# Patient Record
Sex: MALE | Race: WHITE | NOT HISPANIC OR LATINO | URBAN - METROPOLITAN AREA
[De-identification: names, ages, dates, MRNs, and addresses within clinical notes are randomized per-mention and may not be internally consistent; named-entity substitution may affect disease eponyms.]

---

## 2018-05-22 ENCOUNTER — INPATIENT (INPATIENT)
Facility: HOSPITAL | Age: 83
LOS: 8 days | Discharge: ANOTHER HOSPITAL | End: 2018-05-31
Attending: HOSPITALIST | Admitting: HOSPITALIST
Payer: MEDICARE

## 2018-05-22 VITALS
OXYGEN SATURATION: 96 % | TEMPERATURE: 98 F | RESPIRATION RATE: 16 BRPM | SYSTOLIC BLOOD PRESSURE: 107 MMHG | HEART RATE: 76 BPM | DIASTOLIC BLOOD PRESSURE: 65 MMHG

## 2018-05-22 DIAGNOSIS — R42 DIZZINESS AND GIDDINESS: ICD-10-CM

## 2018-05-22 DIAGNOSIS — R06.00 DYSPNEA, UNSPECIFIED: ICD-10-CM

## 2018-05-22 DIAGNOSIS — Z29.9 ENCOUNTER FOR PROPHYLACTIC MEASURES, UNSPECIFIED: ICD-10-CM

## 2018-05-22 DIAGNOSIS — J90 PLEURAL EFFUSION, NOT ELSEWHERE CLASSIFIED: ICD-10-CM

## 2018-05-22 DIAGNOSIS — I35.0 NONRHEUMATIC AORTIC (VALVE) STENOSIS: ICD-10-CM

## 2018-05-22 DIAGNOSIS — N17.9 ACUTE KIDNEY FAILURE, UNSPECIFIED: ICD-10-CM

## 2018-05-22 DIAGNOSIS — R06.09 OTHER FORMS OF DYSPNEA: ICD-10-CM

## 2018-05-22 LAB
ALBUMIN SERPL ELPH-MCNC: 3.7 G/DL — SIGNIFICANT CHANGE UP (ref 3.3–5)
ALP SERPL-CCNC: 109 U/L — SIGNIFICANT CHANGE UP (ref 40–120)
ALT FLD-CCNC: 8 U/L — SIGNIFICANT CHANGE UP (ref 4–41)
AST SERPL-CCNC: 21 U/L — SIGNIFICANT CHANGE UP (ref 4–40)
BASE EXCESS BLDV CALC-SCNC: -0.2 MMOL/L — SIGNIFICANT CHANGE UP
BASOPHILS # BLD AUTO: 0.04 K/UL — SIGNIFICANT CHANGE UP (ref 0–0.2)
BASOPHILS NFR BLD AUTO: 0.5 % — SIGNIFICANT CHANGE UP (ref 0–2)
BILIRUB SERPL-MCNC: 1 MG/DL — SIGNIFICANT CHANGE UP (ref 0.2–1.2)
BLOOD GAS VENOUS - CREATININE: 1.54 MG/DL — HIGH (ref 0.5–1.3)
BUN SERPL-MCNC: 31 MG/DL — HIGH (ref 7–23)
CALCIUM SERPL-MCNC: 8.8 MG/DL — SIGNIFICANT CHANGE UP (ref 8.4–10.5)
CHLORIDE BLDV-SCNC: 112 MMOL/L — HIGH (ref 96–108)
CHLORIDE SERPL-SCNC: 103 MMOL/L — SIGNIFICANT CHANGE UP (ref 98–107)
CK SERPL-CCNC: 45 U/L — SIGNIFICANT CHANGE UP (ref 30–200)
CO2 SERPL-SCNC: 22 MMOL/L — SIGNIFICANT CHANGE UP (ref 22–31)
CREAT SERPL-MCNC: 1.59 MG/DL — HIGH (ref 0.5–1.3)
EOSINOPHIL # BLD AUTO: 0.03 K/UL — SIGNIFICANT CHANGE UP (ref 0–0.5)
EOSINOPHIL NFR BLD AUTO: 0.4 % — SIGNIFICANT CHANGE UP (ref 0–6)
GAS PNL BLDV: 134 MMOL/L — LOW (ref 136–146)
GLUCOSE BLDV-MCNC: 95 — SIGNIFICANT CHANGE UP (ref 70–99)
GLUCOSE SERPL-MCNC: 92 MG/DL — SIGNIFICANT CHANGE UP (ref 70–99)
HCO3 BLDV-SCNC: 24 MMOL/L — SIGNIFICANT CHANGE UP (ref 20–27)
HCT VFR BLD CALC: 44 % — SIGNIFICANT CHANGE UP (ref 39–50)
HCT VFR BLDV CALC: 42.4 % — SIGNIFICANT CHANGE UP (ref 39–51)
HGB BLD-MCNC: 13.9 G/DL — SIGNIFICANT CHANGE UP (ref 13–17)
HGB BLDV-MCNC: 13.8 G/DL — SIGNIFICANT CHANGE UP (ref 13–17)
IMM GRANULOCYTES # BLD AUTO: 0.03 # — SIGNIFICANT CHANGE UP
IMM GRANULOCYTES NFR BLD AUTO: 0.4 % — SIGNIFICANT CHANGE UP (ref 0–1.5)
LACTATE BLDV-MCNC: 1.1 MMOL/L — SIGNIFICANT CHANGE UP (ref 0.5–2)
LYMPHOCYTES # BLD AUTO: 1.84 K/UL — SIGNIFICANT CHANGE UP (ref 1–3.3)
LYMPHOCYTES # BLD AUTO: 24 % — SIGNIFICANT CHANGE UP (ref 13–44)
MCHC RBC-ENTMCNC: 31.6 % — LOW (ref 32–36)
MCHC RBC-ENTMCNC: 32.1 PG — SIGNIFICANT CHANGE UP (ref 27–34)
MCV RBC AUTO: 101.6 FL — HIGH (ref 80–100)
MONOCYTES # BLD AUTO: 0.35 K/UL — SIGNIFICANT CHANGE UP (ref 0–0.9)
MONOCYTES NFR BLD AUTO: 4.6 % — SIGNIFICANT CHANGE UP (ref 2–14)
NEUTROPHILS # BLD AUTO: 5.38 K/UL — SIGNIFICANT CHANGE UP (ref 1.8–7.4)
NEUTROPHILS NFR BLD AUTO: 70.1 % — SIGNIFICANT CHANGE UP (ref 43–77)
NRBC # FLD: 0 — SIGNIFICANT CHANGE UP
NT-PROBNP SERPL-SCNC: 2416 PG/ML — SIGNIFICANT CHANGE UP
PCO2 BLDV: 42 MMHG — SIGNIFICANT CHANGE UP (ref 41–51)
PH BLDV: 7.38 PH — SIGNIFICANT CHANGE UP (ref 7.32–7.43)
PLATELET # BLD AUTO: 176 K/UL — SIGNIFICANT CHANGE UP (ref 150–400)
PMV BLD: 12.6 FL — SIGNIFICANT CHANGE UP (ref 7–13)
PO2 BLDV: 43 MMHG — HIGH (ref 35–40)
POTASSIUM BLDV-SCNC: 3.9 MMOL/L — SIGNIFICANT CHANGE UP (ref 3.4–4.5)
POTASSIUM SERPL-MCNC: 4.4 MMOL/L — SIGNIFICANT CHANGE UP (ref 3.5–5.3)
POTASSIUM SERPL-SCNC: 4.4 MMOL/L — SIGNIFICANT CHANGE UP (ref 3.5–5.3)
PROT SERPL-MCNC: 6.8 G/DL — SIGNIFICANT CHANGE UP (ref 6–8.3)
RBC # BLD: 4.33 M/UL — SIGNIFICANT CHANGE UP (ref 4.2–5.8)
RBC # FLD: 13 % — SIGNIFICANT CHANGE UP (ref 10.3–14.5)
SAO2 % BLDV: 72.1 % — SIGNIFICANT CHANGE UP (ref 60–85)
SODIUM SERPL-SCNC: 143 MMOL/L — SIGNIFICANT CHANGE UP (ref 135–145)
TROPONIN T SERPL-MCNC: < 0.06 NG/ML — SIGNIFICANT CHANGE UP (ref 0–0.06)
WBC # BLD: 7.67 K/UL — SIGNIFICANT CHANGE UP (ref 3.8–10.5)
WBC # FLD AUTO: 7.67 K/UL — SIGNIFICANT CHANGE UP (ref 3.8–10.5)

## 2018-05-22 PROCEDURE — 71046 X-RAY EXAM CHEST 2 VIEWS: CPT | Mod: 26

## 2018-05-22 PROCEDURE — 70450 CT HEAD/BRAIN W/O DYE: CPT | Mod: 26

## 2018-05-22 PROCEDURE — 99223 1ST HOSP IP/OBS HIGH 75: CPT | Mod: GC

## 2018-05-22 RX ORDER — HEPARIN SODIUM 5000 [USP'U]/ML
5000 INJECTION INTRAVENOUS; SUBCUTANEOUS EVERY 8 HOURS
Qty: 0 | Refills: 0 | Status: DISCONTINUED | OUTPATIENT
Start: 2018-05-22 | End: 2018-05-31

## 2018-05-22 RX ADMIN — HEPARIN SODIUM 5000 UNIT(S): 5000 INJECTION INTRAVENOUS; SUBCUTANEOUS at 21:33

## 2018-05-22 NOTE — ED ADULT NURSE NOTE - OBJECTIVE STATEMENT
Receive patient in room 4 accompanied by his daughter-in-law from home with complaints of dizzyness x 1 week. 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 Sign  Christina Arellano made aware that her services are needed for patient. Patient is alert and oriented x 3 no c/o pain , no c/o dizzyness no respiratory distress. Placed on cardiac monitor, labs sent will continue to monitor. Receive patient in room 4 accompanied by his daughter-in-law Yaa from home with complaints of dizzyness x 1 week. 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  Christina Arellano made aware that her services are needed for patient. Patient is alert and oriented x 3 no c/o pain , no c/o dizzyness no respiratory distress. Placed on cardiac monitor, labs sent will continue to monitor.

## 2018-05-22 NOTE — ED PROCEDURE NOTE - PROCEDURE ADDITIONAL DETAILS
[id number], Ultrasound, limited, Thorax  Focused ED ultrasound of the lungs and pleura:    Indication:    Findings: Normal lung sliding bilaterally  Moderate left pleural effusion.  Scattered static air bronchograms on the Left.  No pneumonia visualized.    Impression: Moderate L pleural effusion with atelectasis.    Procedure note and images placed in chart

## 2018-05-22 NOTE — H&P ADULT - ASSESSMENT
86y M with pmhx of deaf/mute p/w SOB/GARCIA x 1 week, found to have moderate size pleural effusion on cxr 86y M with pmhx of deaf/mute p/w dizziness x 1 week, found to have moderate size pleural effusion on cxr at urgent care, sent in for evaluation

## 2018-05-22 NOTE — H&P ADULT - NSHPPHYSICALEXAM_GEN_ALL_CORE
PHYSICAL EXAM:  Vital Signs Last 24 Hrs  T(C): 36.7 (05-22-18 @ 13:52)  T(F): 98 (05-22-18 @ 13:52), Max: 98.2 (05-22-18 @ 10:13)  HR: 82 (05-22-18 @ 13:52) (76 - 85)  BP: 120/70 (05-22-18 @ 13:52)  BP(mean): --  RR: 19 (05-22-18 @ 13:52) (16 - 19)  SpO2: 100% (05-22-18 @ 13:52) (96% - 100%)  Wt(kg): --    Constitutional: NAD, awake and alert  EYES: EOMI  Neck: Soft and supple, No JVD  Respiratory: Diminished BS on LLL, otherwise CTA   Cardiovascular: S1 and S2, regular rate and rhythm,  4/6 systolic murmur loudest in 2nd R ICS with radiation to carotids  Gastrointestinal: Bowel Sounds present, soft, nontender, nondistended, no guarding, no rebound  Extremities: No cyanosis or clubbing; warm to touch  Vascular: 2+ peripheral pulses lower ex  Neurological: Patient is deaf/mute, no appreciable deficits visible  Musculoskeletal: 5/5 strength b/l upper and lower extremities  Skin: No rashes, warm & dry  Psych: no depression or anhedonia  Heme: no bruises, no nose bleeds PHYSICAL EXAM:  Vital Signs Last 24 Hrs  T(C): 36.7 (05-22-18 @ 13:52)  T(F): 98 (05-22-18 @ 13:52), Max: 98.2 (05-22-18 @ 10:13)  HR: 82 (05-22-18 @ 13:52) (76 - 85)  BP: 120/70 (05-22-18 @ 13:52)  BP(mean): --  RR: 19 (05-22-18 @ 13:52) (16 - 19)  SpO2: 100% (05-22-18 @ 13:52) (96% - 100%)  Wt(kg): --    Constitutional: NAD, awake and alert  EYES: EOMI, PERRL, no nystagmus  Neck: Soft and supple, No JVD  Respiratory: Diminished BS on LLL, otherwise CTA   Cardiovascular: S1 and S2, regular rate and rhythm,  4/6 systolic murmur loudest in 2nd R ICS with radiation to carotids  Gastrointestinal: Bowel Sounds present, soft, nontender, nondistended, no guarding, no rebound  Extremities: No cyanosis or clubbing; warm to touch  Vascular: 2+ peripheral pulses lower ex  Neurological: Patient is deaf/mute, no appreciable deficits visible. AOx3, Negative cerebellar signs. No dysdiadochokinesia  Musculoskeletal: 5/5 strength b/l upper and lower extremities  Skin: No rashes, warm & dry  Psych: no depression or anhedonia  Heme: no bruises, no nose bleeds

## 2018-05-22 NOTE — ED ADULT TRIAGE NOTE - CHIEF COMPLAINT QUOTE
brought in by daughter in law for evaluation of dizziness x 1 week, was taken to [facility] 2 days ago for evaluation and was instructed to go to ED for evaluation of abnormal EKG and left plueral effusion seen on xray.  [facility] wanted to call EMS but patient refused.  As per family was suppose to have a cardiac procedure done some time ago and refused.  Patient c/o chest discomfort.  Patient is deaf and mute.

## 2018-05-22 NOTE — ED PROVIDER NOTE - OBJECTIVE STATEMENT
86M hx deaf/mute, with outpt EKG LBBB/long QT and left pleural effusion from urgent care records 2 days ago, brought by daughter in law for GARCIA, SOB, palpitations and dizziness x 1 week, worsened 2 days ago. Patient refused w/u last time he was referred for "procedure", has not had f/u with pcp in 5 years. 86M hx deaf/mute, with outpt EKG LBBB/long QT and left pleural effusion from urgent care records 2 days ago, brought by daughter in law for GARCIA, SOB, palpitations and dizziness x 1 week, worsened 2 days ago. Patient refused w/u last time he was referred for "procedure", has not had f/u with pcp in 5 years.     at bedside.

## 2018-05-22 NOTE — ED PROVIDER NOTE - NS ED ATTENDING STATEMENT MOD
- Decrease the iron supplement to once daily, and continue this indefinitely.    I have personally seen and examined this patient.  I have fully participated in the care of this patient. I have reviewed all pertinent clinical information, including history, physical exam, plan and the Resident’s note and agree except as noted.

## 2018-05-22 NOTE — H&P ADULT - PROBLEM SELECTOR PLAN 1
- Patient with GARCIA/SOB x 1 week, found to have new L pleural effusion compared to CT scan from 2015  - Unclear etiology of pleural effusion or GARCIA at this time, however suspicion for HF related effusion/ GARCIA vs. AS   - Prior TTE from 2016 showed severe AS with reduced LV fxn ( no documented EF)  - Patients labs consistent with elevated pro-bnp of 2460, no EKG changes, Negative CE's. No leukocytosis or fevers noted  - Will check CT chest, repeat TTE, pulm consult for possible thoracentesis  - After CT chest performed would trial IV diuresis - Patient with complaints of dizziness x 1 week with gait instability which has since improved  - Incidentally found to have a L pleural effusion on CXR  - Will check a CTH, B12, TSH  - Repeat TTE for AS  - Will Check CTH for structural/ central causes of dizziness - Patient with complaints of dizziness x 1 week with gait instability which has since improved  - Incidentally found to have a L pleural effusion on CXR  - Will check a CTH, B12, TSH  - Will Check CTH for structural/ central causes of dizziness  - PT/OT consult  - Can likely follow up outpt with neurology if sx's persist. - Patient with complaints of dizziness x 1 week with gait instability which has since improved  - Incidentally found to have a L pleural effusion on CXR  - Will check a CTH, B12, TSH  - Will Check CTH for structural/ central causes of dizziness  - PT/OT consult  -If patient continues to have sx's would call neurology c/s and check MRI

## 2018-05-22 NOTE — H&P ADULT - HISTORY OF PRESENT ILLNESS
86y M with pmhx of deaf/mute p/w SOB/GARCIA x 1 week. Patient was seen by urgent care and found to have a L pleural effusion on CXR. Of note, no  available at this time; hx is obtained from the ED provider note and chart review. VM left for pt's son Rah at [phone]. ED provider note states patient has been having palpitations, dizziness, GARCIA/SOB x 1 week. Is not on any medications, has not seen a PCP in over 5 years. Of note patient was last hospitalized in [facility] in Sept 2016 for GIB in setting of heavy ETOH use, EGD at the time revealed one non-bleeding 6mm cratered gastric ulcer with multiple small non-bleeding erosions in gastric antrum. No stigmata of recent bleeding was found. In the duodenal bulb, there was diffusely moderately erythematous mucosa without active bleeding      TTE from 2016: : Moderate global left ventricular systolic dysfunction. Severe AS      VS in the ED: Temp of 98.2, HR: 76-85, BP: 107//69, RR: 18 98% on RA 86y M with pmhx of deaf/mute p/w SOB/GARCIA x 1 week. Patient was seen by urgent care and found to have a L pleural effusion on CXR. Of note, no  available at this time; hx is obtained from the ED provider note and chart review. VM left for pt's son Rah at [phone]. ED provider note states patient has been having palpitations, dizziness, GARCIA/SOB x 1 week. Is not on any medications, has not seen a PCP in over 5 years. Of note patient was last hospitalized in [facility] in Sept 2016 for GIB in setting of heavy ETOH use, EGD at the time revealed one non-bleeding 6mm cratered gastric ulcer with multiple small non-bleeding erosions in gastric antrum. No stigmata of recent bleeding was found. In the duodenal bulb, there was diffusely moderately erythematous mucosa without active bleeding      TTE from 2016: : Moderate global left ventricular systolic dysfunction. Severe AS      ******Addendum ****** 15:30     present at the bedside. Patient states that for the last week he has had dizziness, gait instability which he describes as being drunk. Denies any hx of falls, nausea, vomiting, fevers, chills, HA, visual changes, recent travel or sick contacts. States his daughter took him to an urgent care doctor two days ago who told him to go to the ED because he has fluid in his lungs. He denies having any medical problems, taking any medications. Denies ever experiencing SOB/GARCIA, CP, palpitations. Patient states that the dizziness has improved.      VS in the ED: Temp of 98.2, HR: 76-85, BP: 107//69, RR: 18 98% on RA

## 2018-05-22 NOTE — H&P ADULT - PROBLEM SELECTOR PLAN 4
- Patient with possible CASH on admission vs. CKD, prior labwork from 2016 showed a Cr of 1.86  - Will avoid nephrotoxic agents at this time  - Check UA, urine studies - Patient with possible CASH on admission vs. CKD, prior labwork from 2016 showed a Cr of 1.86  - Will avoid nephrotoxic agents at this time  - Check UA, urine studies  - Patient likely has CKD

## 2018-05-22 NOTE — H&P ADULT - NSHPSOCIALHISTORY_GEN_ALL_CORE
Prior H&P from 2016 states patient is a daily ETOH user, 3-4 drinks a day. Former 5 pack year smoking hx, quit 7 years ago. Prior H&P from 2016 states patient is a daily ETOH user, 2 glasses of wine a  day. Former 5 pack year smoking hx, quit 32 years ago. Ambulates with a cane Daily ETOH user, 2 glasses of wine a  day. Former 5 pack year smoking hx, quit 32 years ago. Ambulates with a cane

## 2018-05-22 NOTE — H&P ADULT - PROBLEM SELECTOR PLAN 5
- PT consult, fall precautions  - Dash diet  - Improve Score of 0.6%, will place SCD - PT consult, fall precautions  - Dash diet  - Improve Score of 0.6%, HSQ in setting of CASH

## 2018-05-22 NOTE — CONSULT NOTE ADULT - ASSESSMENT
86 year old M who is deaf/mute and history of severe AS p/w dizziness x 1 week w/ L pleural effusion on CXR, now asymptomatic and bedside US showing small L pleural effusion, likely etiology from heart failure given elevated pro-BNP and severe AS.     Recommendation:   - Bedside POCUS findings as above, effusion on L side is very small, patient asymptomatic currently and effusion is likely related to heart failure, no thoracentesis at this time, recommend diuresis as tolerated  - Repeat TTE    Case discussed with fellow Dr. Cottrell and attending Dr. Mode Glynn MD PGY-1  Pager: [phone]

## 2018-05-22 NOTE — ED ADULT NURSE NOTE - CHIEF COMPLAINT QUOTE
brought in by daughter in law for evaluation of dizziness x 1 week, was taken to [facility] 2 days ago for evaluation and was instructed to go to ED for evaluation of abnormal EKG and left plueral effusion seen on xray.  [facility] wanted to call EMS but patient refused.  As per family was suppose to have a cardiac procedure done some time ago and refused.  Patient c/o chest discomfort.  Patient is deaf and mute.

## 2018-05-22 NOTE — H&P ADULT - PROBLEM SELECTOR PLAN 3
- Patient with severe AS on prior TTE from 2016, suspect clinical presentation at least partially 2/2 AS  - Will repeat TTE, f/u CT chest  - Consider Cardiology consult if AS deemed to be cause of GARCIA - Patient with severe AS on prior TTE from 2016, suspect clinical presentation at least partially 2/2 AS  - Will repeat TTE  - Consider Cardiology consult if indicated but patient appears to have asymptomatic AS at this time  - Gentle diuresis

## 2018-05-22 NOTE — ED ADULT NURSE NOTE - CHPI ED SYMPTOMS NEG
no chest pain/no chills/no diaphoresis/no syncope/no shortness of breath/no vomiting/no cough/no fever/no back pain/no nausea

## 2018-05-22 NOTE — ED PROVIDER NOTE - ATTENDING CONTRIBUTION TO CARE
Attending Statement: I have personally seen and examined this patient. I have fully participated in the care of this patient. I have reviewed all pertinent clinical information, including history physical exam, plan and the Resident's note and agree except as noted   85yo M hx deaf/mute from home with daughter in law "to be checked out"   She states for last week he has "looked short of breath" and "felt dizzy" went to an  couple days ago had a cxr and ekg, Found to have a LBBB and a left pleural effusion, advised to go to ED. Pt refused. went home. Today daughter in law bought in to ED, pt 'feeling better" Pt has no complaints. "doesn't want to be here" no chest pain no sob. no pain. pending  to come.  Vital signs noted. pt able to undress without any distress. looks comfortable normal S1-S2 No resp distress. able to speak in full and clear sentences. no wheeze, rales or stridor. soft nondistended nontender abdomen. no pedal edema. no calf tenderness. normal pulses bilateral feet.   plan ekg, labs, cxr, reassess with . Attending Statement: I have personally seen and examined this patient. I have fully participated in the care of this patient. I have reviewed all pertinent clinical information, including history physical exam, plan and the Resident's note and agree except as noted   87yo M hx deaf/mute from home with daughter in law "to be checked out"   She states for last week he has "looked short of breath" and "felt dizzy" went to an  couple days ago had a cxr and ekg, Found to have a LBBB and a left pleural effusion, advised to go to ED. Pt refused. went home. Today daughter in law bought in to ED, pt 'feeling better" Pt has no complaints. "doesn't want to be here" no chest pain no sob. no pain. pending  to come.  Vital signs noted. pt able to undress without any distress. looks comfortable normal S1-S2 No resp distress. able to speak in full and clear sentences. dec air entry at the left base. no wheeze, rales or stridor. soft nondistended nontender abdomen. no pedal edema. no calf tenderness. normal pulses bilateral feet.   plan ekg, labs, cxr, reassess with .

## 2018-05-22 NOTE — ED PROVIDER NOTE - MEDICAL DECISION MAKING DETAILS
86M with pleural effusion of unknown etiology with dyspnea likely 2/2 effusion, LBBB on today's ekg consistent with outpt UC findings - unlikely PE as satting normal on RA, not tachycardic, no limitation in activities, denies current chest pain. sob but per daughter in law has been having decreased exercise tolerance x 1 week

## 2018-05-22 NOTE — CONSULT NOTE ADULT - SUBJECTIVE AND OBJECTIVE BOX
CHIEF COMPLAINT: Dizziness    HPI: 86 year old M who is deaf/mute and history of severe AS p/w dizziness x 1 week, also intermittent shortness of breath, however states dizziness is what brought him to the hospital. Patient was brought to urgent care center by daughter, CXR done reportedly showed L pleural effusion and he was referred to the ED. History obtained from patient using bedside sign-. Patient is not on any medications, has not seen a PCP in over 5 years. He denies chest pain, fevers, chills, coughing, denies shortness of breath at present time of interview. Per chart review, last TTE from 2016 showed moderate global left ventricular systolic dysfunction and severe AS    VS in the ED: Temp of 98.2, HR: 76-85, BP: 107//69, RR: 18 98% on RA. CXR done showed elevated L hemidiaphragm with L pleural effusion. Pro-BNP 2416, Cr 1.59, lab workup otherwise unremarkable. ED course was unremarkable, patient admitted to medicine for further w/u and management. Pulmonary consulted for evaluation for thoracentesis.     PAST MEDICAL & SURGICAL HISTORY:  Aortic stenosis  Mute  Deaf  No significant past surgical history    FAMILY HISTORY:  Family history of cancer (Sibling)      SOCIAL HISTORY:  Smoking: [ ] Never Smoked [x] Former Smoker (1 packs x 5 years)  Substance Use: [x] Never Used [ ] Used ____  EtOH Use: Former every day alcohol use 3-4 drinks per day    Allergies    No Known Allergies    Intolerances      REVIEW OF SYSTEMS:  Constitutional: No fever or chills  Eyes: No visual changes, eye pain or redness  HEENT: No throat pain, ear pain, nasal pain. No nose bleeding.  CV: No chest pain or lower extremity edema  Resp: + SOB. No cough  GI: No abd pain. No nausea or vomiting. No diarrhea. No constipation.   : No dysuria, hematuria.   MSK: No musculoskeletal pain  Skin: No rash  Neuro: + Dizziness. No headache. No numbness or tingling. No weakness.      OBJECTIVE:  ICU Vital Signs Last 24 Hrs  T(C): 36.2 (22 May 2018 15:26), Max: 36.8 (22 May 2018 10:13)  T(F): 97.1 (22 May 2018 15:26), Max: 98.2 (22 May 2018 10:13)  HR: 88 (22 May 2018 15:26) (76 - 88)  BP: 126/82 (22 May 2018 15:26) (107/65 - 129/69)  BP(mean): --  ABP: --  ABP(mean): --  RR: 20 (22 May 2018 15:26) (16 - 20)  SpO2: 95% (22 May 2018 15:26) (95% - 100%)      PHYSICAL EXAM:  General: Patient in NAD, non-toxic, resting comfortably  HEENT: Head NCAT, PERRLA  Lymph Nodes: No LAD  Neck: Neck supple, no JVD  Respiratory: Decreased breath sounds LLL, no wheezing, rhonchi, rales  Cardiovascular: RRR, +systolic murmur R sternal border, no gallops or rubs   Abdomen: Obese abdomen, soft, NT/ND normoactive bowel sounds  Extremities: No clubbing, cyanosis, no edema. + peripheral pulses throughout  Skin: No lesions or rashes  Neurological: A & O x 3, sensory and motor grossly intact      LABS:                        13.9   7.67  )-----------( 176      ( 22 May 2018 11:34 )             44.0     Hgb Trend: 13.9<--  05-22    143  |  103  |  31<H>  ----------------------------<  92  4.4   |  22  |  1.59<H>    Ca    8.8      22 May 2018 11:34    TPro  6.8  /  Alb  3.7  /  TBili  1.0  /  DBili  x   /  AST  21  /  ALT  8   /  AlkPhos  109  05-22    Creatinine Trend: 1.59<--    Venous Blood Gas:  05-22 @ 11:50  7.38/42/43/24/72.1  VBG Lactate: 1.1    RADIOLOGY:  [x] Reviewed and interpreted by me    < from: Xray Chest 2 Views PA/Lat (05.22.18 @ 11:17) >    INTERPRETATION:     Marked elevation of the left hemidiaphragm associated with small effusion   on this side. Visualized left lung and right lung are clear. Heart is   difficult to evaluate.        COMPARISON:  February 22, 2015      IMPRESSION:  Marked elevation of left hemidiaphragm with small left   effusion suggested.    < end of copied text > CHIEF COMPLAINT: Dizziness    HPI: 86 year old M who is deaf/mute and history of severe AS p/w dizziness x 1 week. Patient was brought to urgent care center by daughter, CXR done reportedly showed L pleural effusion and he was referred to the ED. History obtained from patient using bedside sign-. Denies shortness of breath, GARCIA. Patient is not on any medications, has not seen a PCP in over 5 years. He denies chest pain, fevers, chills, coughing, denies shortness of breath at present time of interview. Per chart review, last TTE from 2016 showed moderate global left ventricular systolic dysfunction and severe AS.    VS in the ED: Temp of 98.2, HR: 76-85, BP: 107//69, RR: 18 98% on RA. CXR done showed elevated L hemidiaphragm with L pleural effusion. Pro-BNP 2416, Cr 1.59, lab workup otherwise unremarkable. ED course was unremarkable, patient admitted to medicine for further w/u and management. Pulmonary consulted for evaluation for thoracentesis.     PAST MEDICAL & SURGICAL HISTORY:  Aortic stenosis  Mute  Deaf  No significant past surgical history    FAMILY HISTORY:  Family history of cancer (Sibling)      SOCIAL HISTORY:  Smoking: [ ] Never Smoked [x] Former Smoker (1 packs x 5 years)  Substance Use: [x] Never Used [ ] Used ____  EtOH Use: Former every day alcohol use 3-4 drinks per day    Allergies    No Known Allergies    Intolerances      REVIEW OF SYSTEMS:  Constitutional: No fever or chills  Eyes: No visual changes, eye pain or redness  HEENT: No throat pain, ear pain, nasal pain. No nose bleeding.  CV: No chest pain or lower extremity edema  Resp: + SOB. No cough  GI: No abd pain. No nausea or vomiting. No diarrhea. No constipation.   : No dysuria, hematuria.   MSK: No musculoskeletal pain  Skin: No rash  Neuro: + Dizziness. No headache. No numbness or tingling. No weakness.      OBJECTIVE:  ICU Vital Signs Last 24 Hrs  T(C): 36.2 (22 May 2018 15:26), Max: 36.8 (22 May 2018 10:13)  T(F): 97.1 (22 May 2018 15:26), Max: 98.2 (22 May 2018 10:13)  HR: 88 (22 May 2018 15:26) (76 - 88)  BP: 126/82 (22 May 2018 15:26) (107/65 - 129/69)  BP(mean): --  ABP: --  ABP(mean): --  RR: 20 (22 May 2018 15:26) (16 - 20)  SpO2: 95% (22 May 2018 15:26) (95% - 100%)      PHYSICAL EXAM:  General: Patient in NAD, non-toxic, resting comfortably  HEENT: Head NCAT, PERRLA  Lymph Nodes: No LAD  Neck: Neck supple, no JVD  Respiratory: Decreased breath sounds LLL, no wheezing, rhonchi, rales  Cardiovascular: RRR, +systolic murmur R sternal border, no gallops or rubs   Abdomen: Obese abdomen, soft, NT/ND normoactive bowel sounds  Extremities: No clubbing, cyanosis, no edema. + peripheral pulses throughout  Skin: No lesions or rashes  Neurological: A & O x 3, sensory and motor grossly intact      LABS:                        13.9   7.67  )-----------( 176      ( 22 May 2018 11:34 )             44.0     Hgb Trend: 13.9<--  05-22    143  |  103  |  31<H>  ----------------------------<  92  4.4   |  22  |  1.59<H>    Ca    8.8      22 May 2018 11:34    TPro  6.8  /  Alb  3.7  /  TBili  1.0  /  DBili  x   /  AST  21  /  ALT  8   /  AlkPhos  109  05-22    Creatinine Trend: 1.59<--    Venous Blood Gas:  05-22 @ 11:50  7.38/42/43/24/72.1  VBG Lactate: 1.1    RADIOLOGY:  [x] Reviewed and interpreted by me    < from: Xray Chest 2 Views PA/Lat (05.22.18 @ 11:17) >    INTERPRETATION:     Marked elevation of the left hemidiaphragm associated with small effusion   on this side. Visualized left lung and right lung are clear. Heart is   difficult to evaluate.        COMPARISON:  February 22, 2015      IMPRESSION:  Marked elevation of left hemidiaphragm with small left   effusion suggested.    < end of copied text >

## 2018-05-22 NOTE — H&P ADULT - ATTENDING COMMENTS
Patient seen and examined by me on 5/23/18. Case discussed with resident and agree with the resident's findings and plan as documented in the resident's note. 86M h/o deaf/mute, severe AS (0.7 sqcm), mild AR, mod global LV systolic dyfxn p/w dizziness x 1 week and questionable SOB symptoms p/w small-moderate sized pleural effusion and work-up for CHF exacerbation.    1. SOB:   -mall-moderate sized L pleural effusion, most likely related to the AS although unilateral, unclear etiology or duration but is new compared to CT chest from 2015   -will consult Cardiology  -f/u repeat TTE to assess Heart fxn and severe AS   -per pulm recs no role for thoracentesis as effusion is small    2. Dizziness:  -f/u CTH for structural/ central causes of dizziness  -PT/OT consult  -If patient continues to have sx's would call neurology c/s and check MRI Patient seen and examined by me on 5/22/18. Case discussed with resident and agree with the resident's findings and plan as documented in the resident's note. 86M h/o deaf/mute, severe AS (0.7 sqcm), mild AR, mod global LV systolic dyfxn p/w dizziness x 1 week and questionable SOB symptoms p/w small-moderate sized pleural effusion and work-up for CHF exacerbation.    1. SOB:   -mall-moderate sized L pleural effusion, most likely related to the AS although unilateral, unclear etiology or duration but is new compared to CT chest from 2015   -will consult Cardiology  -f/u repeat TTE to assess Heart fxn and severe AS   -per pulm recs no role for thoracentesis as effusion is small    2. Dizziness:  -f/u CTH for structural/ central causes of dizziness  -PT/OT consult  -If patient continues to have sx's would call neurology c/s and check MRI

## 2018-05-22 NOTE — H&P ADULT - NSHPLABSRESULTS_GEN_ALL_CORE
LABS: Personally Read and Interpreted                          13.9   7.67  )-----------( 176      ( 22 May 2018 11:34 )             44.0     Hgb Trend: 13.9<--      05-22    143  |  103  |  31<H>  ----------------------------<  92  4.4   |  22  |  1.59<H>    Ca    8.8      22 May 2018 11:34    TPro  6.8  /  Alb  3.7  /  TBili  1.0  /  DBili  x   /  AST  21  /  ALT  8   /  AlkPhos  109  05-22    Creatinine Trend: 1.59<--    CARDIAC MARKERS ( 22 May 2018 11:34 )  x     / < 0.06 ng/mL / 45 u/L / x     / x                EKG: tracing personally read and reviewed: NSR, rate: 80, L axis, LBBB. No significant changes compared to EKG from Sept 2016     IMAGING  CXR: personally read and reviewed- moderate sized L pleural effusion LABS: Personally Read and Interpreted                          13.9   7.67  )-----------( 176      ( 22 May 2018 11:34 )             44.0     Hgb Trend: 13.9<--      05-22    143  |  103  |  31<H>  ----------------------------<  92  4.4   |  22  |  1.59<H>    Ca    8.8      22 May 2018 11:34    TPro  6.8  /  Alb  3.7  /  TBili  1.0  /  DBili  x   /  AST  21  /  ALT  8   /  AlkPhos  109  05-22    Creatinine Trend: 1.59<--    CARDIAC MARKERS ( 22 May 2018 11:34 )  x     / < 0.06 ng/mL / 45 u/L / x     / x            EKG: tracing personally read and reviewed: NSR, rate: 80, L axis, LBBB. No significant changes compared to EKG from Sept 2016     IMAGING  CXR: personally read and reviewed- moderate sized L pleural effusion

## 2018-05-22 NOTE — CONSULT NOTE ADULT - ATTENDING COMMENTS
Small pleural effusion in setting of known aortic valve disease and elevated proBNP, agree with diuresis, no need for thoracentesis at this time.

## 2018-05-22 NOTE — H&P ADULT - NSHPREVIEWOFSYSTEMS_GEN_ALL_CORE
Constitutional: denies fevers, chills, night sweats, weight loss  HEENT: denies visual changes, hearing changes, rhinitis, odynophagia, or dysphagia  Cardiovascular: denies palpitations, chest pain, edema  Respiratory: denies SOB, wheezing  Gastrointestinal: denies N/V/D, abdominal pain, hematochezia, melena  : denies dysuria, hematuria  MSK: denies weakness, joint pain  Neuro: + dizziness  Heme: Denies bleeding or hemoptysis   Skin: denies new rashes or masses

## 2018-05-22 NOTE — H&P ADULT - PROBLEM SELECTOR PLAN 2
- Patient with moderate sized pleural effusion, unclear etiology or duration but is new compared to CT chest from 2015  - Will repeat CT chest to better assess effusion  - TTE to assess Heart fxn and AS  - Pulm consulted for possible thoracentesis but suspect effusion likely 2/2 HF/ Aortic stenosis  - Consider diuresis after CT chest - Patient with moderate sized pleural effusion, unclear etiology or duration but is new compared to CT chest from 2015  - TTE to assess Heart fxn and AS  - Pulm consulted for possible thoracentesis but suspect effusion likely 2/2 HF/ Aortic stenosis  - Consider gentle diuresis and monitor improvement in effusion - Patient with moderate sized pleural effusion, unclear etiology or duration but is new compared to CT chest from 2015  - TTE to assess Heart fxn and AS  - Pulm consulted for possible thoracentesis but suspect effusion likely 2/2 HF/ Aortic stenosis  - Consider gentle diuresis in setting of AS and monitor improvement in effusion

## 2018-05-23 DIAGNOSIS — N18.3 CHRONIC KIDNEY DISEASE, STAGE 3 (MODERATE): ICD-10-CM

## 2018-05-23 DIAGNOSIS — H91.3 DEAF NONSPEAKING, NOT ELSEWHERE CLASSIFIED: ICD-10-CM

## 2018-05-23 LAB
BUN SERPL-MCNC: 32 MG/DL — HIGH (ref 7–23)
CALCIUM SERPL-MCNC: 8.6 MG/DL — SIGNIFICANT CHANGE UP (ref 8.4–10.5)
CHLORIDE SERPL-SCNC: 104 MMOL/L — SIGNIFICANT CHANGE UP (ref 98–107)
CO2 SERPL-SCNC: 24 MMOL/L — SIGNIFICANT CHANGE UP (ref 22–31)
CREAT SERPL-MCNC: 1.54 MG/DL — HIGH (ref 0.5–1.3)
GLUCOSE SERPL-MCNC: 91 MG/DL — SIGNIFICANT CHANGE UP (ref 70–99)
HCT VFR BLD CALC: 39.9 % — SIGNIFICANT CHANGE UP (ref 39–50)
HGB BLD-MCNC: 13 G/DL — SIGNIFICANT CHANGE UP (ref 13–17)
MAGNESIUM SERPL-MCNC: 2.3 MG/DL — SIGNIFICANT CHANGE UP (ref 1.6–2.6)
MCHC RBC-ENTMCNC: 32.6 % — SIGNIFICANT CHANGE UP (ref 32–36)
MCHC RBC-ENTMCNC: 32.7 PG — SIGNIFICANT CHANGE UP (ref 27–34)
MCV RBC AUTO: 100.5 FL — HIGH (ref 80–100)
NRBC # FLD: 0 — SIGNIFICANT CHANGE UP
PHOSPHATE SERPL-MCNC: 3.3 MG/DL — SIGNIFICANT CHANGE UP (ref 2.5–4.5)
PLATELET # BLD AUTO: 157 K/UL — SIGNIFICANT CHANGE UP (ref 150–400)
PMV BLD: 12.4 FL — SIGNIFICANT CHANGE UP (ref 7–13)
POTASSIUM SERPL-MCNC: 4.3 MMOL/L — SIGNIFICANT CHANGE UP (ref 3.5–5.3)
POTASSIUM SERPL-SCNC: 4.3 MMOL/L — SIGNIFICANT CHANGE UP (ref 3.5–5.3)
RBC # BLD: 3.97 M/UL — LOW (ref 4.2–5.8)
RBC # FLD: 13.1 % — SIGNIFICANT CHANGE UP (ref 10.3–14.5)
SODIUM SERPL-SCNC: 142 MMOL/L — SIGNIFICANT CHANGE UP (ref 135–145)
T4 FREE SERPL-MCNC: 1.24 NG/DL — SIGNIFICANT CHANGE UP (ref 0.9–1.8)
TSH SERPL-MCNC: 5.37 UIU/ML — HIGH (ref 0.27–4.2)
VIT B12 SERPL-MCNC: 584 PG/ML — SIGNIFICANT CHANGE UP (ref 200–900)
WBC # BLD: 6.33 K/UL — SIGNIFICANT CHANGE UP (ref 3.8–10.5)
WBC # FLD AUTO: 6.33 K/UL — SIGNIFICANT CHANGE UP (ref 3.8–10.5)

## 2018-05-23 PROCEDURE — 93306 TTE W/DOPPLER COMPLETE: CPT | Mod: 26

## 2018-05-23 PROCEDURE — 99232 SBSQ HOSP IP/OBS MODERATE 35: CPT | Mod: GC

## 2018-05-23 PROCEDURE — 99233 SBSQ HOSP IP/OBS HIGH 50: CPT | Mod: GC

## 2018-05-23 RX ORDER — METOPROLOL TARTRATE 50 MG
12.5 TABLET ORAL
Qty: 0 | Refills: 0 | Status: DISCONTINUED | OUTPATIENT
Start: 2018-05-23 | End: 2018-05-25

## 2018-05-23 RX ORDER — FUROSEMIDE 40 MG
20 TABLET ORAL DAILY
Qty: 0 | Refills: 0 | Status: DISCONTINUED | OUTPATIENT
Start: 2018-05-23 | End: 2018-05-31

## 2018-05-23 RX ADMIN — Medication 20 MILLIGRAM(S): at 14:52

## 2018-05-23 RX ADMIN — Medication 12.5 MILLIGRAM(S): at 18:26

## 2018-05-23 RX ADMIN — HEPARIN SODIUM 5000 UNIT(S): 5000 INJECTION INTRAVENOUS; SUBCUTANEOUS at 06:43

## 2018-05-23 RX ADMIN — HEPARIN SODIUM 5000 UNIT(S): 5000 INJECTION INTRAVENOUS; SUBCUTANEOUS at 14:53

## 2018-05-23 RX ADMIN — HEPARIN SODIUM 5000 UNIT(S): 5000 INJECTION INTRAVENOUS; SUBCUTANEOUS at 22:12

## 2018-05-23 NOTE — PROGRESS NOTE ADULT - PROBLEM SELECTOR PLAN 2
- Patient with moderate sized pleural effusion, unclear etiology or duration but is new compared to CT chest from 2015  - TTE to assess Heart fxn and AS  - Pulm consulted for possible thoracentesis but suspect effusion likely 2/2 HF/ Aortic stenosis  - Consider gentle diuresis in setting of AS and monitor improvement in effusion - Patient with severe AS on prior TTE from 2016, suspect clinical presentation at least partially 2/2 AS  - Will repeat TTE  - Consider Cardiology consult if indicated but patient appears to have asymptomatic AS at this time  - Gentle diuresis - Patient with severe AS on prior TTE from 2016, suspect clinical presentation at least partially 2/2 AS  - Will repeat TTE  -f/u Cardiology consult  - Gentle diuresis w/ Lasix 20mg qd  - metoprolol 12.5mg BID started and holding ACE  - TAVR, cath w/u

## 2018-05-23 NOTE — PROGRESS NOTE ADULT - PROBLEM SELECTOR PLAN 5
- PT consult, fall precautions  - Dash diet  - Improve Score of 0.6%, HSQ in setting of CASH -stable and would need daily

## 2018-05-23 NOTE — PHYSICAL THERAPY INITIAL EVALUATION ADULT - DISCHARGE DISPOSITION, PT EVAL
Anticipating Home Physical Therapy; Follow progress with PT upon completion of ambulation assessment.

## 2018-05-23 NOTE — PROGRESS NOTE ADULT - SUBJECTIVE AND OBJECTIVE BOX
CHIEF COMPLAINT: L pleural effusion    Interval Events: No acute events overnight. Patient saturating 95-98% on RA, no reported respiratory distress/ SOB overnight. Didn't recieve any diuretics. Patient seen and examined at bedside. He denies shortness of breath, fevers, chills. Denies recent cough or weight loss, says he used to smoke but quit 30 years ago.     REVIEW OF SYSTEMS:  Constitutional: No fever or chills  Eyes: No visual changes, eye pain or redness  HEENT: No throat pain, ear pain, nasal pain. No nose bleeding.  CV: No chest pain or lower extremity edema  Resp: No SOB no cough  GI: No abd pain. No nausea or vomiting. No diarrhea. No constipation.   : No dysuria, hematuria.   MSK: No musculoskeletal pain  Skin: No rash  Neuro: No headache. No numbness or tingling. No weakness.      OBJECTIVE:  ICU Vital Signs Last 24 Hrs  T(C): 36.5 (23 May 2018 06:46), Max: 36.8 (22 May 2018 10:13)  T(F): 97.7 (23 May 2018 06:46), Max: 98.2 (22 May 2018 10:13)  HR: 70 (23 May 2018 06:46) (70 - 88)  BP: 122/81 (23 May 2018 06:46) (107/65 - 129/69)  BP(mean): --  ABP: --  ABP(mean): --  RR: 18 (23 May 2018 06:46) (16 - 20)  SpO2: 98% (23 May 2018 06:46) (95% - 100%)      PHYSICAL EXAM:  General: Patient in NAD. non-toxic, sitting comfortably in chair   HEENT: Head NCAT, PERRLA  Lymph Nodes: No LAD  Neck: Neck supple, no JVD  Respiratory: Decreased breath sounds at L lung base, no wheezing, rhonchi  Cardiovascular: RRR, harsh holosystolic murmur R sternal border. No gallops or rubs  Abdomen: Obese abdomen, soft, NT/ ND normoactive bowel sounds  Extremities: No clubbing, cyanosis, no peripheral edema, + peripheral pulses x 4  Skin: No lesions or rashes  Neurological: A& O x 3, grossly intact      HOSPITAL MEDICATIONS:  heparin  Injectable 5000 Unit(s) SubCutaneous every 8 hours      LABS:                        13.0   6.33  )-----------( 157      ( 23 May 2018 06:29 )             39.9     Hgb Trend: 13.0<--, 13.9<--  05-23    142  |  104  |  32<H>  ----------------------------<  91  4.3   |  24  |  1.54<H>    Ca    8.6      23 May 2018 06:29  Phos  3.3     05-23  Mg     2.3     05-23    TPro  6.8  /  Alb  3.7  /  TBili  1.0  /  DBili  x   /  AST  21  /  ALT  8   /  AlkPhos  109  05-22    Creatinine Trend: 1.54<--, 1.59<--    Venous Blood Gas:  05-22 @ 11:50  7.38/42/43/24/72.1  VBG Lactate: 1.1

## 2018-05-23 NOTE — PROGRESS NOTE ADULT - ASSESSMENT
86y M with pmhx of deaf/mute p/w dizziness x 1 week, found to have moderate size pleural effusion on cxr at urgent care, sent in for evaluation 86y M with PMH of severe AS, deaf/mute p/w dizziness x 1 week, found to have small-moderate left pleural effusion and admitted for CHF workup 86y M with PMH of severe AS, CKD stage 3, deaf/mute p/w dizziness x 1 week, found to have small-moderate left pleural effusion and admitted for CHF workup

## 2018-05-23 NOTE — PROGRESS NOTE ADULT - PROBLEM SELECTOR PLAN 4
- Patient with possible CASH on admission vs. CKD, prior labwork from 2016 showed a Cr of 1.86  - Will avoid nephrotoxic agents at this time  - Check UA, urine studies  - Patient likely has CKD - Pt at baseline Cr, has CKD stage 3 at baseline, prior labwork from 2016 showed a Cr of 1.86  - Will avoid nephrotoxic agents at this time  - Check UA, urine studies

## 2018-05-23 NOTE — PROGRESS NOTE ADULT - SUBJECTIVE AND OBJECTIVE BOX
Cirilo Perez  PGY1 Internal Medicine  Pager [phone] or [phone]    Patient is a 86y old  Male who presents with a chief complaint of Came for c/o SOB (22 May 2018 15:05)      SUBJECTIVE / OVERNIGHT EVENTS:  No overnight events, no SOB, CP, nausea, vomiting, diarrhea, fevers.    MEDICATIONS  (STANDING):  heparin  Injectable 5000 Unit(s) SubCutaneous every 8 hours    MEDICATIONS  (PRN):      T(C): 36.4 (05-22-18 @ 21:53), Max: 36.8 (05-22-18 @ 10:13)  HR: 86 (05-22-18 @ 21:53) (76 - 88)  BP: 120/84 (05-22-18 @ 21:53) (107/65 - 129/69)  RR: 18 (05-22-18 @ 21:53) (16 - 20)  SpO2: 95% (05-22-18 @ 21:53) (95% - 100%)  CAPILLARY BLOOD GLUCOSE    Physical Exam:  Constitutional: NAD, awake and alert  	EYES: EOMI, PERRL, no nystagmus  	Neck: Soft and supple, No JVD  	Respiratory: Diminished BS on LLL, otherwise CTA   	Cardiovascular: S1 and S2, regular rate and rhythm,  4/6 systolic murmur loudest in 2nd R ICS with radiation to carotids  	Gastrointestinal: Bowel Sounds present, soft, nontender, nondistended, no guarding, no rebound  	Extremities: No cyanosis or clubbing; warm to touch  	Vascular: 2+ peripheral pulses lower ex  	Neurological: Patient is deaf/mute, no appreciable deficits visible. AOx3, Negative cerebellar signs. No dysdiadochokinesia  	Musculoskeletal: 5/5 strength b/l upper and lower extremities  	Skin: No rashes, warm & dry  	Psych: no depression or anhedonia  Heme: no bruises, no nose bleeds      I&O's Summary            LABS:                        13.9   7.67  )-----------( 176      ( 22 May 2018 11:34 )             44.0     05-22    143  |  103  |  31<H>  ----------------------------<  92  4.4   |  22  |  1.59<H>    Ca    8.8      22 May 2018 11:34    TPro  6.8  /  Alb  3.7  /  TBili  1.0  /  DBili  x   /  AST  21  /  ALT  8   /  AlkPhos  109  05-22      CARDIAC MARKERS ( 22 May 2018 11:34 )  x     / < 0.06 ng/mL / 45 u/L / x     / x              Micro:      RADIOLOGY & ADDITIONAL TESTS:    Imaging Personally Reviewed:    Consultant(s) Notes Reviewed:      Care Discussed with Consultants/Other Providers: Cirilo Perez  PGY1 Internal Medicine  Pager [phone] or [phone]    Patient is a 86y old  Male who presents with a chief complaint of Came for c/o SOB (22 May 2018 15:05)      SUBJECTIVE / OVERNIGHT EVENTS:  No overnight events, no SOB, CP, nausea, vomiting, diarrhea, fevers.    MEDICATIONS  (STANDING):  heparin  Injectable 5000 Unit(s) SubCutaneous every 8 hours    MEDICATIONS  (PRN):      T(C): 36.4 (05-22-18 @ 21:53), Max: 36.8 (05-22-18 @ 10:13)  HR: 86 (05-22-18 @ 21:53) (76 - 88)  BP: 120/84 (05-22-18 @ 21:53) (107/65 - 129/69)  RR: 18 (05-22-18 @ 21:53) (16 - 20)  SpO2: 95% (05-22-18 @ 21:53) (95% - 100%)  CAPILLARY BLOOD GLUCOSE    Physical Exam:  Constitutional: NAD, awake and alert  	EYES: EOMI, PERRL, no nystagmus  	Neck: Soft and supple, No JVD  	Respiratory: Diminished BS on LLL, otherwise CTA   	Cardiovascular: S1 and S2, regular rate and rhythm,  4/6 systolic murmur loudest in 2nd R ICS with radiation to carotids  	Gastrointestinal: Bowel Sounds present, soft, nontender, nondistended, no guarding, no rebound  	Extremities: No cyanosis or clubbing; warm to touch  	Vascular: 2+ peripheral pulses lower ex  	Neurological: Patient is deaf/mute, no appreciable deficits visible. AOx3, Negative cerebellar signs. No dysdiadochokinesia  	Musculoskeletal: 5/5 strength b/l upper and lower extremities  	Skin: No rashes, warm & dry  	Psych: no depression or anhedonia  Heme: no bruises, no nose bleeds      I&O's Summary            LABS:                        13.9   7.67  )-----------( 176      ( 22 May 2018 11:34 )             44.0     05-22    143  |  103  |  31<H>  ----------------------------<  92  4.4   |  22  |  1.59<H>    Ca    8.8      22 May 2018 11:34    TPro  6.8  /  Alb  3.7  /  TBili  1.0  /  DBili  x   /  AST  21  /  ALT  8   /  AlkPhos  109  05-22      CARDIAC MARKERS ( 22 May 2018 11:34 )  x     / < 0.06 ng/mL / 45 u/L / x     / x              Micro:      RADIOLOGY & ADDITIONAL TESTS:    Imaging Personally Reviewed:    Consultant(s) Notes Reviewed: Pulm    Care Discussed with Consultants/Other Providers: Pulm,  Cirilo Lee  PGY1 Internal Medicine  Pager [phone] or [phone]    Patient is a 86y old  Male who presents with a chief complaint of Came for c/o SOB (22 May 2018 15:05)      SUBJECTIVE / OVERNIGHT EVENTS:   on call:   No overnight events, no SOB, CP, nausea, vomiting, diarrhea, fevers.    MEDICATIONS  (STANDING):  heparin  Injectable 5000 Unit(s) SubCutaneous every 8 hours    MEDICATIONS  (PRN):      T(C): 36.4 (05-22-18 @ 21:53), Max: 36.8 (05-22-18 @ 10:13)  HR: 86 (05-22-18 @ 21:53) (76 - 88)  BP: 120/84 (05-22-18 @ 21:53) (107/65 - 129/69)  RR: 18 (05-22-18 @ 21:53) (16 - 20)  SpO2: 95% (05-22-18 @ 21:53) (95% - 100%)  CAPILLARY BLOOD GLUCOSE    Physical Exam:  Constitutional: NAD, awake and alert  	EYES: EOMI, PERRL, no nystagmus  	Neck: Soft and supple, No JVD  	Respiratory: Diminished BS on LLL, otherwise CTA   	Cardiovascular: S1 and S2, regular rate and rhythm,  4/6 systolic murmur loudest in 2nd R ICS with radiation to carotids  	Gastrointestinal: Bowel Sounds present, soft, nontender, nondistended, no guarding, no rebound  	Extremities: No cyanosis or clubbing; warm to touch  	Vascular: 2+ peripheral pulses lower ex  	Neurological: Patient is deaf/mute, no appreciable deficits visible. AOx3, Negative cerebellar signs. No dysdiadochokinesia  	Musculoskeletal: 5/5 strength b/l upper and lower extremities  	Skin: No rashes, warm & dry  	Psych: no depression or anhedonia  Heme: no bruises, no nose bleeds      I&O's Summary            LABS:                        13.9   7.67  )-----------( 176      ( 22 May 2018 11:34 )             44.0     05-22    143  |  103  |  31<H>  ----------------------------<  92  4.4   |  22  |  1.59<H>    Ca    8.8      22 May 2018 11:34    TPro  6.8  /  Alb  3.7  /  TBili  1.0  /  DBili  x   /  AST  21  /  ALT  8   /  AlkPhos  109  05-22      CARDIAC MARKERS ( 22 May 2018 11:34 )  x     / < 0.06 ng/mL / 45 u/L / x     / x              Micro:      RADIOLOGY & ADDITIONAL TESTS:    Imaging Personally Reviewed:    Consultant(s) Notes Reviewed: Pulm    Care Discussed with Consultants/Other Providers: Pulm,

## 2018-05-23 NOTE — CONSULT NOTE ADULT - SUBJECTIVE AND OBJECTIVE BOX
CHIEF COMPLAINT:Patient is a 86y old  Male who presents with a chief complaint of Came for c/o SOB (22 May 2018 15:05)      HISTORY OF PRESENT ILLNESS:  6y M with pmhx of deaf/mute p/w SOB/GARCIA x 1 week. Patient was seen by urgent care and found to have a L pleural effusion on CXR.   also he has complained of being dizzy   history taken from chart and minimally from pt    PAST MEDICAL & SURGICAL HISTORY:  Aortic stenosis  Mute  Deaf  No significant past surgical history          MEDICATIONS:  heparin  Injectable 5000 Unit(s) SubCutaneous every 8 hours                  FAMILY HISTORY:  Family history of cancer (Sibling)      Non-contributory    SOCIAL HISTORY:    history of etoh    Allergies    No Known Allergies    Intolerances    	    REVIEW OF SYSTEMS:  as above  The rest of the 14 points ROS reviewed and except above they are unremarkable.        PHYSICAL EXAM:  T(C): 36.5 (05-23-18 @ 06:46), Max: 36.8 (05-22-18 @ 10:13)  HR: 70 (05-23-18 @ 06:46) (70 - 88)  BP: 122/81 (05-23-18 @ 06:46) (107/65 - 129/69)  RR: 18 (05-23-18 @ 06:46) (16 - 20)  SpO2: 98% (05-23-18 @ 06:46) (95% - 100%)  Wt(kg): --  I&O's Summary      JVP: Normal  Neck: supple  Lung: dec bs on left side   CV: S1 S2 , Murmur: 3/6 zandra   Abd: soft  Ext: No edema  neuro: Awake / alert  Psych: flat affect  Skin: normal     LABS/DATA:    TELEMETRY: 	    ECG:  	sinus, LBBB   	  CARDIAC MARKERS:  Troponin T, Serum: < 0.06 ng/mL (05-22 @ 11:34)                                  13.0   6.33  )-----------( 157      ( 23 May 2018 06:29 )             39.9     05-22    143  |  103  |  31<H>  ----------------------------<  92  4.4   |  22  |  1.59<H>    Ca    8.8      22 May 2018 11:34    TPro  6.8  /  Alb  3.7  /  TBili  1.0  /  DBili  x   /  AST  21  /  ALT  8   /  AlkPhos  109  05-22    proBNP: Serum Pro-Brain Natriuretic Peptide: 2416 pg/mL (05-22 @ 11:34)    Lipid Profile:   HgA1c:   TSH:     < from: Transthoracic Echocardiogram (09.28.16 @ 08:36) >  ------------------------------------------------------------------------  CONCLUSIONS:  1. Mitral annular calcification, otherwise normal mitral  valve. Mild mitral regurgitation.  2. Aortic valve leaflet morphology not well visualized.  The valve is heavily calcified. Peak transaortic valve  gradient equals 105 mm Hg, mean transaortic valve gradient  equals 52 mm Hg, estimated aortic valve area equals 0.7  sqcm (by continuity equation), consistent with severe  aortic stenosis. Mild aortic regurgitation.  3. Moderate global left ventricular systolic dysfunction.  4. Normal right ventricular size and function.  *** Compared with echocardiogram of 2/23/2015, the  transaortic gradients have increased significantly.  In  addition, left ventricular systolic function has  deteriorated.    < end of copied text >

## 2018-05-23 NOTE — OCCUPATIONAL THERAPY INITIAL EVALUATION ADULT - PERTINENT HX OF CURRENT PROBLEM, REHAB EVAL
86 y M with pmhx of deaf/mute p/w SOB/GARCIA x 1 week. Pt was seen by urgent care and found to have a Left pleural effusion on CXR. Pt has been having palpitations, dizziness, GARCIA/SOB x 1 week.

## 2018-05-23 NOTE — PROGRESS NOTE ADULT - ASSESSMENT
86 year old M who is deaf/mute and history of severe AS p/w dizziness x 1 week w/ L pleural effusion on CXR, now asymptomatic and bedside US showing L pleural effusion, likely etiology from heart failure given elevated pro-BNP and severe AS vs infection although unlikely vs malignancy    Recommendation:   - Severe AS in 2016 with valve surface area < 0.8, repeat TTE pending. Cardiology requesting thoracentesis to evaluate pleural effusion prior to inpatient TAVR workup including cardiac catheterization, also recommending Lasix 20 mg, no diuresis underway yet  - Possible thoracentesis today, will discuss with patient    Will discuss with attending    Kyree Glynn MD PGY-1  Pager: [phone] 86 year old M who is deaf/mute and history of severe AS p/w dizziness x 1 week w/ L pleural effusion on CXR, now asymptomatic and bedside US showing L pleural effusion, likely etiology from heart failure given elevated pro-BNP and severe AS vs infection although unlikely vs malignancy    Recommendation:   - Severe AS in 2016 with valve surface area < 0.8, repeat TTE pending. Cardiology requesting thoracentesis to evaluate pleural effusion prior to inpatient TAVR workup including cardiac catheterization, also recommending Lasix 20 mg  - F/u TTE, c/w Lasix   - Plan for thoracentesis tomorrow, will discuss with patient, check AM platelets    Discussed with attending Dr. Coello, fellow Dr. Cottrell, and primary team    Kyree Glynn MD PGY-1  Pager: [phone]

## 2018-05-23 NOTE — PROGRESS NOTE ADULT - PROBLEM SELECTOR PLAN 1
- Patient with complaints of dizziness x 1 week with gait instability which has since improved  - Incidentally found to have a L pleural effusion on CXR  - Will check a CTH, B12, TSH  - Will Check CTH for structural/ central causes of dizziness  - PT/OT consult  -If patient continues to have sx's would call neurology c/s and check MRI - Patient with small-moderate sized L pleural effusion, most likely related to the AS although unilateral, unclear etiology or duration but is new compared to CT chest from 2015 and last TTE 9/2016   - TTE to assess Heart fxn and AS  - Pulm consulted for possible thoracentesis but suspect effusion likely 2/2 HF/ Aortic stenosis  - Consider gentle diuresis in setting of AS and monitor improvement in effusion - Patient with small-moderate sized L pleural effusion, most likely related to the AS although unilateral, unclear etiology or duration but is new compared to CT chest from 2015 and last TTE 9/2016: severe AS (0.7 sqcm), mild AR, mod global LV systolic dyfxn  - f/u TTE to assess Heart fxn and severe AS   - f/u Pulm recs for thoracentesis but suspect effusion likely 2/2 HF/ Aortic stenosis vs infection vs malignancy  - Consider gentle diuresis in setting of AS and monitor improvement in effusion, started on Lasix 20mg qd  - strict I/Os

## 2018-05-23 NOTE — CONSULT NOTE ADULT - ASSESSMENT
Pleural effusion  unilateral with clear on right side , atypical for chf,  would obtain CT of chest without contrast   fu with pulm  may need diagnostic thoracenteses, rule out infection/malignancy   we can try gentle diuresis Ie lasix 20 daily for now    Severe/ critical  AS   Chronic systolic chf   add low dose BB   hold of to ace for now   obtain echo   will consider work up for tavr, cath , etc. in this admission pending evaluation of left pleural effusion

## 2018-05-24 LAB
BUN SERPL-MCNC: 32 MG/DL — HIGH (ref 7–23)
CALCIUM SERPL-MCNC: 8.5 MG/DL — SIGNIFICANT CHANGE UP (ref 8.4–10.5)
CHLORIDE SERPL-SCNC: 104 MMOL/L — SIGNIFICANT CHANGE UP (ref 98–107)
CO2 SERPL-SCNC: 26 MMOL/L — SIGNIFICANT CHANGE UP (ref 22–31)
CREAT SERPL-MCNC: 1.67 MG/DL — HIGH (ref 0.5–1.3)
GLUCOSE SERPL-MCNC: 84 MG/DL — SIGNIFICANT CHANGE UP (ref 70–99)
HCT VFR BLD CALC: 38.3 % — LOW (ref 39–50)
HGB BLD-MCNC: 12.3 G/DL — LOW (ref 13–17)
MAGNESIUM SERPL-MCNC: 2.4 MG/DL — SIGNIFICANT CHANGE UP (ref 1.6–2.6)
MCHC RBC-ENTMCNC: 32.1 % — SIGNIFICANT CHANGE UP (ref 32–36)
MCHC RBC-ENTMCNC: 32.6 PG — SIGNIFICANT CHANGE UP (ref 27–34)
MCV RBC AUTO: 101.6 FL — HIGH (ref 80–100)
NRBC # FLD: 0 — SIGNIFICANT CHANGE UP
PHOSPHATE SERPL-MCNC: 3.6 MG/DL — SIGNIFICANT CHANGE UP (ref 2.5–4.5)
PLATELET # BLD AUTO: 128 K/UL — LOW (ref 150–400)
PMV BLD: 12.5 FL — SIGNIFICANT CHANGE UP (ref 7–13)
POTASSIUM SERPL-MCNC: 3.8 MMOL/L — SIGNIFICANT CHANGE UP (ref 3.5–5.3)
POTASSIUM SERPL-SCNC: 3.8 MMOL/L — SIGNIFICANT CHANGE UP (ref 3.5–5.3)
RBC # BLD: 3.77 M/UL — LOW (ref 4.2–5.8)
RBC # FLD: 13.2 % — SIGNIFICANT CHANGE UP (ref 10.3–14.5)
SODIUM SERPL-SCNC: 141 MMOL/L — SIGNIFICANT CHANGE UP (ref 135–145)
WBC # BLD: 5.37 K/UL — SIGNIFICANT CHANGE UP (ref 3.8–10.5)
WBC # FLD AUTO: 5.37 K/UL — SIGNIFICANT CHANGE UP (ref 3.8–10.5)

## 2018-05-24 PROCEDURE — 99233 SBSQ HOSP IP/OBS HIGH 50: CPT | Mod: GC

## 2018-05-24 PROCEDURE — 71045 X-RAY EXAM CHEST 1 VIEW: CPT | Mod: 26

## 2018-05-24 RX ADMIN — Medication 12.5 MILLIGRAM(S): at 06:15

## 2018-05-24 RX ADMIN — HEPARIN SODIUM 5000 UNIT(S): 5000 INJECTION INTRAVENOUS; SUBCUTANEOUS at 14:53

## 2018-05-24 RX ADMIN — Medication 20 MILLIGRAM(S): at 06:16

## 2018-05-24 RX ADMIN — Medication 12.5 MILLIGRAM(S): at 18:54

## 2018-05-24 RX ADMIN — HEPARIN SODIUM 5000 UNIT(S): 5000 INJECTION INTRAVENOUS; SUBCUTANEOUS at 06:15

## 2018-05-24 NOTE — PROGRESS NOTE ADULT - SUBJECTIVE AND OBJECTIVE BOX
Cirilo Chris  PGY1 Internal Medicine  Pager [phone] or [phone]    Patient is a 86y old  Male who presents with a chief complaint of Came for c/o SOB (22 May 2018 15:05)      SUBJECTIVE / OVERNIGHT EVENTS:   present:  No overnight events, no SOB/CP,f/n/v/d  Pt refusing TAVR procedure if it has to involve anesthesia using a mask due to a prior history as a child with complications of anestheia/procedure. He understands the risks and benefits but still refused.     MEDICATIONS  (STANDING):  furosemide    Tablet 20 milliGRAM(s) Oral daily  heparin  Injectable 5000 Unit(s) SubCutaneous every 8 hours  metoprolol tartrate 12.5 milliGRAM(s) Oral two times a day    MEDICATIONS  (PRN):      T(C): 36.7 (05-23-18 @ 21:51), Max: 36.7 (05-23-18 @ 21:51)  HR: 60 (05-23-18 @ 21:51) (60 - 81)  BP: 110/70 (05-23-18 @ 21:51) (110/70 - 124/76)  RR: 18 (05-23-18 @ 21:51) (16 - 18)  SpO2: 95% (05-23-18 @ 21:51) (95% - 99%)  CAPILLARY BLOOD GLUCOSE    Physical Exam:  Constitutional: NAD, awake and alert, deaf/mute  	EYES: EOMI, PERRL, no nystagmus  	Neck: Soft and supple, No JVD  	Respiratory: Diminished BS on LLL, otherwise CTA   	Cardiovascular: S1 and S2, regular rate and rhythm,  4/6 systolic murmur loudest in 2nd R ICS with radiation to carotids  	Gastrointestinal: Bowel Sounds present, soft, nontender, nondistended, no guarding, no rebound  	Extremities: No cyanosis or clubbing; warm to touch  	Vascular: 2+ peripheral pulses lower ex  	Neurological: Patient is deaf/mute, no appreciable deficits visible. AOx3, Negative cerebellar signs. No dysdiadochokinesia  	Musculoskeletal: 5/5 strength b/l upper and lower extremities  	Skin: No rashes, warm & dry  	Psych: no depression or anhedonia  Heme: no bruises, no nose bleeds      I&O's Summary    23 May 2018 07:01  -  24 May 2018 06:08  --------------------------------------------------------  IN: 0 mL / OUT: 700 mL / NET: -700 mL          LABS:                        13.0   6.33  )-----------( 157      ( 23 May 2018 06:29 )             39.9     05-23    142  |  104  |  32<H>  ----------------------------<  91  4.3   |  24  |  1.54<H>    Ca    8.6      23 May 2018 06:29  Phos  3.3     05-23  Mg     2.3     05-23    TPro  6.8  /  Alb  3.7  /  TBili  1.0  /  DBili  x   /  AST  21  /  ALT  8   /  AlkPhos  109  05-22      CARDIAC MARKERS ( 22 May 2018 11:34 )  x     / < 0.06 ng/mL / 45 u/L / x     / x              Micro:      RADIOLOGY & ADDITIONAL TESTS:    Imaging Personally Reviewed:    Consultant(s) Notes Reviewed: Pulm, Cardio, PT/OT    Care Discussed with Consultants/Other Providers: Pulm, Cardio on further TAVR w/u and thoracentesis

## 2018-05-24 NOTE — PROGRESS NOTE ADULT - PROBLEM SELECTOR PLAN 4
- Pt at baseline Cr, has CKD stage 3 at baseline, prior labwork from 2016 showed a Cr of 1.86  - Will avoid nephrotoxic agents at this time  - Check UA, urine studies

## 2018-05-24 NOTE — PROGRESS NOTE ADULT - PROBLEM SELECTOR PLAN 1
- Patient with small-moderate sized L pleural effusion, most likely related to the AS although unilateral, unclear etiology or duration but is new compared to CT chest from 2015 and last TTE 9/2016: severe AS (0.7 sqcm), mild AR, mod global LV systolic dyfxn  - f/u TTE 5/23:  0.6 sqcm AV area, severe AS, mild AR, severe global LV dyxfn, RV w/ decreased R ventricular systolic fxn  - f/u Pulm recs for thoracentesis but suspect effusion likely 2/2 HF/ Aortic stenosis vs infection vs malignancy  -However, pt refusing TAVR procedure if it has to involve anesthesia using a mask due to a prior history as a child with complications of anesthesia/procedure. He understands the risks and benefits but still refused and would not need a thoracentesis for TAVR  - Consider gentle diuresis in setting of AS and monitor improvement in effusion, started on Lasix 20mg qd  - strict I/Os

## 2018-05-24 NOTE — PROGRESS NOTE ADULT - ASSESSMENT
Pleural effusion  unilateral with clear on right side , atypical for chf,  would obtain CT of chest without contrast   fu with pulm  may need diagnostic thoracenteses, rule out infection/malignancy   lasix 20 daily for now    Severe/ critical  AS   Chronic systolic chf severe   low dose BB   hold of to ace for now   Dr Perez called to eval for TAVR

## 2018-05-24 NOTE — PROGRESS NOTE ADULT - ASSESSMENT
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 L pleural effusion likely in setting of heart failure, however cardiology requesting thoracentesis to evaluate pleural effusion/ r/o infection/ malignancy prior to inpatient TAVR workup including cardiac catheterization. Patient agreeable to thoracentesis and consented, will proceed with thoracentesis this AM  - Treatment of HFrEF as per primary team/ cardiology    Will discuss with attending Dr. Coello, fellow Dr. 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 L pleural effusion likely in setting of heart failure, however cardiology requesting thoracentesis to evaluate pleural effusion/ r/o infection/ malignancy prior to inpatient TAVR workup including cardiac catheterization. Patient agreeable to thoracentesis and consented, will proceed with thoracentesis this AM  - Treatment of HFrEF as per primary team/ cardiology    Discussed with attending Dr. Coello, fellow Dr. 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 L pleural effusion likely in setting of heart failure, however cardiology requesting thoracentesis to evaluate pleural effusion/ r/o infection/ malignancy prior to inpatient TAVR workup including cardiac catheterization. Patient agreeable to thoracentesis and consented, will proceed with thoracentesis this AM  - Treatment of HFrEF as per primary team/ cardiology    Discussed with attending Dr. Coello, fellow Dr. Cottrell, and primary team    ADDENDUM:   L sided pleural effusion appeared much smaller on bedside US after diuresis. Team attempted thoracentesis but was unsuccessful, no pleural fluid able to be drained. The fact that pleural effusion smaller after diuresis implies may failure is most likely etiology especially given TTE findings yesterday.    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 L pleural effusion likely in setting of heart failure, however cardiology requesting thoracentesis to evaluate pleural effusion/ r/o infection/ malignancy prior to inpatient TAVR workup including cardiac catheterization. Patient agreeable to thoracentesis and consented, will proceed with thoracentesis this AM  - Treatment of HFrEF as per primary team/ cardiology    Discussed with attending Dr. Coello, fellow Dr. Cottrell, and primary team    ADDENDUM:   L sided pleural effusion appeared much smaller on bedside US after diuresis. Team attempted thoracentesis but was unsuccessful, no pleural fluid able to be drained. The fact that pleural effusion smaller after diuresis suggesting heart failure is most likely etiology especially given TTE findings yesterday.    Kyree Glynn MD PGY-1  Pager: [phone]

## 2018-05-24 NOTE — PROGRESS NOTE ADULT - SUBJECTIVE AND OBJECTIVE BOX
Interval Events: Patient had TTE done yesterday, showed HFrEF 23%, severe global LV dysfunction, RV dilation and decreased systolic function and critical AS with valve area 0.6 w/ mild AR. Started on Lasix and b-blocker for HFrEF. Respiratory status stable, SPO2 96-99% on RA. Patient seen and examined at bedside this AM,  used. Patient remains stable and asymptomatic without SOB, cough, fever, chills. Is agreeable to thoracentesis after being explained risks/ benefits, signed consent for procedure.     REVIEW OF SYSTEMS:  Constitutional: No fever or chills  Eyes: No visual changes, eye pain or redness  HEENT: No throat pain, ear pain, nasal pain. No nose bleeding.  CV: No chest pain or lower extremity edema  Resp: No SOB no cough  GI: No abd pain. No nausea or vomiting. No diarrhea. No constipation.   : No dysuria, hematuria.   MSK: No musculoskeletal pain  Skin: No rash  Neuro: No headache. No numbness or tingling. No weakness.    OBJECTIVE:  ICU Vital Signs Last 24 Hrs  T(C): 36.4 (24 May 2018 06:13), Max: 36.7 (23 May 2018 21:51)  T(F): 97.5 (24 May 2018 06:13), Max: 98 (23 May 2018 21:51)  HR: 55 (24 May 2018 06:13) (55 - 81)  BP: 105/50 (24 May 2018 06:13) (105/50 - 124/76)  BP(mean): --  ABP: --  ABP(mean): --  RR: 17 (24 May 2018 06:13) (16 - 18)  SpO2: 96% (24 May 2018 06:13) (95% - 99%)    05-23 @ 07:01  -  05-24 @ 07:00  --------------------------------------------------------  IN: 120 mL / OUT: 850 mL / NET: -730 mL      PHYSICAL EXAM:  General: Patient deaf. In NAD. non-toxic, sitting comfortably in chair   HEENT: Head NCAT, PERRLA  Lymph Nodes: No LAD  Neck: Neck supple, no JVD  Respiratory: Decreased breath sounds at L lung base, no wheezing, rhonchi  Cardiovascular: RRR, harsh holosystolic murmur R sternal border. No gallops or rubs  Abdomen: Obese abdomen, soft, NT/ ND normoactive bowel sounds  Extremities: No clubbing, cyanosis, no peripheral edema, + peripheral pulses x 4  Skin: No lesions or rashes  Neurological: A& O x 3, grossly intact      HOSPITAL MEDICATIONS:  heparin  Injectable 5000 Unit(s) SubCutaneous every 8 hours  furosemide    Tablet 20 milliGRAM(s) Oral daily  metoprolol tartrate 12.5 milliGRAM(s) Oral two times a day      LABS:                        12.3   5.37  )-----------( 128      ( 24 May 2018 06:45 )             38.3     Hgb Trend: 12.3<--, 13.0<--, 13.9<--  05-24    141  |  104  |  32<H>  ----------------------------<  84  3.8   |  26  |  1.67<H>    Ca    8.5      24 May 2018 06:18  Phos  3.6     05-24  Mg     2.4     05-24    TPro  6.8  /  Alb  3.7  /  TBili  1.0  /  DBili  x   /  AST  21  /  ALT  8   /  AlkPhos  109  05-22  Creatinine Trend: 1.67<--, 1.54<--, 1.59<--    Venous Blood Gas:  05-22 @ 11:50  7.38/42/43/24/72.1  VBG Lactate: 1.1    ECHOCARDIOGRAPHY:   < from: Transthoracic Echocardiogram (05.23.18 @ 10:37) >  Ejection Fraction (Teicholtz): 23 %  ------------------------------------------------------------------------  OBSERVATIONS:  Mitral Valve: Mitral annular calcification, tethered mitral  valve. Mild mitral regurgitation.  Aortic Root: Normal aortic root.  Aortic Valve: Calcified trileaflet aortic valve with  decreased opening. Peak transaortic valve gradient equals  106 mm Hg, mean transaortic valve gradient equals 53 mm Hg,  estimated aortic valve area equals 0.6 sqcm (by continuity  equation), consistent with severe aortic stenosis. Mild  aortic regurgitation.  LVOT velocity time integralequals  17 cm.  Left Atrium: Normal left atrium.  LA volume index = 33  cc/m2.  Left Ventricle: Severe global left ventricular systolic  dysfunction.  Right Heart: Normal right atrium. Right ventricular  enlargement with decreased right ventricular systolic  function. Normal tricuspid valve. Minimal tricuspid  regurgitation. Normal pulmonic valve. Minimal pulmonic  regurgitation.  Pericardium/PleuraNormal pericardium with no pericardial  effusion.  ------------------------------------------------------------------------  CONCLUSIONS:  1. Calcified trileaflet aortic valve with decreased  opening. Peak transaortic valve gradient equals 106 mm Hg,  mean transaortic valve gradient equals 53 mm Hg, estimated  aortic valve area equals 0.6 sqcm (by continuity equation),  consistent with severe aortic stenosis. Mild aortic  regurgitation.  2. Severe global left ventricular systolic dysfunction.  3. Right ventricular enlargement with decreased right  ventricular systolic function.    < end of copied text >

## 2018-05-24 NOTE — PROGRESS NOTE ADULT - ASSESSMENT
86y M with PMH of severe AS, CKD stage 3, deaf/mute p/w dizziness x 1 week, found to have small-moderate left pleural effusion and admitted for CHF workup

## 2018-05-24 NOTE — PROGRESS NOTE ADULT - PROBLEM SELECTOR PLAN 2
- Patient with severe AS on prior TTE from 2016, suspect clinical presentation at least partially 2/2 AS  - TTE 5/23: showed severe AS, mild AR, Global LV and RV dysfxn  -f/u Cardiology consult:  - Gentle diuresis w/ Lasix 20mg qd  - metoprolol 12.5mg BID started and holding ACE  - TAVR, cath w/u  - However, pt is refusing TAVR if anesthesia uses a mask and deferring it, understands risks and benefits

## 2018-05-24 NOTE — PROGRESS NOTE ADULT - PROBLEM SELECTOR PLAN 3
- Patient with complaints of dizziness x 1 week with gait instability which has since improved  - Incidentally found to have a L pleural effusion on CXR  - Will check a CTH: no ICH, acute infarcts  - f/u B12 (584), TSH (5.37), f/u T4  - f/u PT/OT consult

## 2018-05-24 NOTE — PROGRESS NOTE ADULT - SUBJECTIVE AND OBJECTIVE BOX
Subjective: Patient seen and examined. No new events except as noted.     SUBJECTIVE/ROS:  feels better       MEDICATIONS:  MEDICATIONS  (STANDING):  furosemide    Tablet 20 milliGRAM(s) Oral daily  heparin  Injectable 5000 Unit(s) SubCutaneous every 8 hours  metoprolol tartrate 12.5 milliGRAM(s) Oral two times a day      PHYSICAL EXAM:  T(C): 36.4 (05-24-18 @ 06:13), Max: 36.7 (05-23-18 @ 21:51)  HR: 55 (05-24-18 @ 06:13) (55 - 81)  BP: 105/50 (05-24-18 @ 06:13) (105/50 - 124/76)  RR: 17 (05-24-18 @ 06:13) (16 - 18)  SpO2: 96% (05-24-18 @ 06:13) (95% - 99%)  Wt(kg): --  I&O's Summary    23 May 2018 07:01  -  24 May 2018 07:00  --------------------------------------------------------  IN: 120 mL / OUT: 850 mL / NET: -730 mL        JVP: Normal  Neck: supple  Lung: clear   CV: S1 S2 , Murmur: zandra   Abd: soft  Ext: No edema  neuro: Awake / alert  Psych: flat affect  Skin: normal       LABS/DATA:    CARDIAC MARKERS:  CARDIAC MARKERS ( 22 May 2018 11:34 )  x     / < 0.06 ng/mL / 45 u/L / x     / x                                    12.3   5.37  )-----------( 128      ( 24 May 2018 06:45 )             38.3     05-24    141  |  104  |  32<H>  ----------------------------<  84  3.8   |  26  |  1.67<H>    Ca    8.5      24 May 2018 06:18  Phos  3.6     05-24  Mg     2.4     05-24    TPro  6.8  /  Alb  3.7  /  TBili  1.0  /  DBili  x   /  AST  21  /  ALT  8   /  AlkPhos  109  05-22    proBNP:   Lipid Profile:   HgA1c:   TSH:     TELE:  EKG:

## 2018-05-25 DIAGNOSIS — I35.0 NONRHEUMATIC AORTIC (VALVE) STENOSIS: ICD-10-CM

## 2018-05-25 DIAGNOSIS — I50.21 ACUTE SYSTOLIC (CONGESTIVE) HEART FAILURE: ICD-10-CM

## 2018-05-25 DIAGNOSIS — N17.9 ACUTE KIDNEY FAILURE, UNSPECIFIED: ICD-10-CM

## 2018-05-25 LAB
APTT BLD: 25.6 SEC — LOW (ref 27.5–37.4)
BUN SERPL-MCNC: 35 MG/DL — HIGH (ref 7–23)
CALCIUM SERPL-MCNC: 8.4 MG/DL — SIGNIFICANT CHANGE UP (ref 8.4–10.5)
CHLORIDE SERPL-SCNC: 104 MMOL/L — SIGNIFICANT CHANGE UP (ref 98–107)
CO2 SERPL-SCNC: 28 MMOL/L — SIGNIFICANT CHANGE UP (ref 22–31)
CREAT SERPL-MCNC: 1.74 MG/DL — HIGH (ref 0.5–1.3)
FOLATE SERPL-MCNC: 6.6 NG/ML — SIGNIFICANT CHANGE UP (ref 4.7–20)
GLUCOSE SERPL-MCNC: 83 MG/DL — SIGNIFICANT CHANGE UP (ref 70–99)
HCT VFR BLD CALC: 39.9 % — SIGNIFICANT CHANGE UP (ref 39–50)
HGB BLD-MCNC: 12.5 G/DL — LOW (ref 13–17)
INR BLD: 1.01 — SIGNIFICANT CHANGE UP (ref 0.88–1.17)
MAGNESIUM SERPL-MCNC: 2.4 MG/DL — SIGNIFICANT CHANGE UP (ref 1.6–2.6)
MCHC RBC-ENTMCNC: 31.3 % — LOW (ref 32–36)
MCHC RBC-ENTMCNC: 32.1 PG — SIGNIFICANT CHANGE UP (ref 27–34)
MCV RBC AUTO: 102.6 FL — HIGH (ref 80–100)
NRBC # FLD: 0 — SIGNIFICANT CHANGE UP
PHOSPHATE SERPL-MCNC: 3.6 MG/DL — SIGNIFICANT CHANGE UP (ref 2.5–4.5)
PLATELET # BLD AUTO: 138 K/UL — LOW (ref 150–400)
PMV BLD: 13 FL — SIGNIFICANT CHANGE UP (ref 7–13)
POTASSIUM SERPL-MCNC: 3.9 MMOL/L — SIGNIFICANT CHANGE UP (ref 3.5–5.3)
POTASSIUM SERPL-SCNC: 3.9 MMOL/L — SIGNIFICANT CHANGE UP (ref 3.5–5.3)
PROTHROM AB SERPL-ACNC: 11.2 SEC — SIGNIFICANT CHANGE UP (ref 9.8–13.1)
RBC # BLD: 3.89 M/UL — LOW (ref 4.2–5.8)
RBC # FLD: 13.1 % — SIGNIFICANT CHANGE UP (ref 10.3–14.5)
SODIUM SERPL-SCNC: 142 MMOL/L — SIGNIFICANT CHANGE UP (ref 135–145)
VIT B12 SERPL-MCNC: 561 PG/ML — SIGNIFICANT CHANGE UP (ref 200–900)
WBC # BLD: 5.65 K/UL — SIGNIFICANT CHANGE UP (ref 3.8–10.5)
WBC # FLD AUTO: 5.65 K/UL — SIGNIFICANT CHANGE UP (ref 3.8–10.5)

## 2018-05-25 PROCEDURE — 99233 SBSQ HOSP IP/OBS HIGH 50: CPT

## 2018-05-25 RX ADMIN — HEPARIN SODIUM 5000 UNIT(S): 5000 INJECTION INTRAVENOUS; SUBCUTANEOUS at 21:54

## 2018-05-25 RX ADMIN — Medication 20 MILLIGRAM(S): at 05:27

## 2018-05-25 NOTE — PROGRESS NOTE ADULT - PROBLEM SELECTOR PLAN 4
s/p diuretics for pleural effusion. As per pulmonary effusion to small to be drained but responsive to diuretics. Continue to monitor.

## 2018-05-25 NOTE — PROGRESS NOTE ADULT - PROBLEM SELECTOR PLAN 2
Patient with severe AS. Dr. Perez to evaluate for TAVR as patient came with dyspnea and left pleural effusion likely due to Critical AS/Acute CHF Patient with severe AS. Dr. Perez to evaluate for TAVR as patient came with dyspnea and left pleural effusion likely due to Critical AS/Acute CHF  - Had extensive conversation with patient via  and daughter about possibility of TAVR, patient to listen to Interventional Cardiology with regards to plan for TAVR/Cath to be done inpatient/outpatient Patient with severe AS. Dr. Perez to evaluate for TAVR as patient came with dyspnea and left pleural effusion likely due to Critical AS/Acute CHF  - Had extensive conversation with patient via  and daughter-in-law Judi [phone] about possibility of TAVR, patient to listen to Interventional Cardiology with regards to plan for TAVR/Cath to be done inpatient/outpatient

## 2018-05-25 NOTE — PROGRESS NOTE ADULT - PROBLEM SELECTOR PLAN 5
SCr 1.74, slowly increasing creatinine, would continue to monitor daily and hold Lasix if SCr continues to climb. Unclear baseline however on admission SCr was lower than it is today

## 2018-05-25 NOTE — CONSULT NOTE ADULT - ASSESSMENT
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 Patient appears to be a reasonable candidate for TAVR (STS score - moderate risk). Recommend outpatient TAVR evaluation at [facility] in upcoming weeks. Patient asked that we make contact with his daughter-in-law Yaa [phone] to make arrangements for outpatient appointments. Patient also provided with [facility] CT Surgery office contact information.

## 2018-05-25 NOTE — CONSULT NOTE ADULT - SUBJECTIVE AND OBJECTIVE BOX
Patient seen and examined.  was present. Patient currently doing okay. Breathing much improved since admission. Has been ambulating without much difficulty. Vitals signs stable. Physical exam revealed decreased breath sounds at bilateral bases, no peripheral edema, and 3/6 LANCE with near obliteration of second heart sound consistent with severe/critical aortic stenosis.

## 2018-05-25 NOTE — PROGRESS NOTE ADULT - SUBJECTIVE AND OBJECTIVE BOX
Subjective: Patient seen and examined. No new events except as noted.     SUBJECTIVE/ROS:  feels ok       MEDICATIONS:  MEDICATIONS  (STANDING):  furosemide    Tablet 20 milliGRAM(s) Oral daily  heparin  Injectable 5000 Unit(s) SubCutaneous every 8 hours  metoprolol tartrate 12.5 milliGRAM(s) Oral two times a day      PHYSICAL EXAM:  T(C): 36.3 (05-25-18 @ 05:25), Max: 36.6 (05-24-18 @ 23:00)  HR: 50 (05-25-18 @ 05:25) (43 - 66)  BP: 116/66 (05-25-18 @ 05:25) (107/58 - 120/72)  RR: 16 (05-25-18 @ 05:25) (16 - 18)  SpO2: 98% (05-25-18 @ 05:25) (96% - 98%)  Wt(kg): --  I&O's Summary    24 May 2018 07:01  -  25 May 2018 07:00  --------------------------------------------------------  IN: 100 mL / OUT: 420 mL / NET: -320 mL          JVP: Normal  Neck: supple  Lung: clear   CV: S1 S2 , Murmur:  Abd: soft  Ext: No edema  neuro: Awake / alert  Psych: flat affect  Skin: normal       LABS/DATA:    CARDIAC MARKERS:  CARDIAC MARKERS ( 22 May 2018 11:34 )  x     / < 0.06 ng/mL / 45 u/L / x     / x                                    12.5   5.65  )-----------( 138      ( 25 May 2018 06:19 )             39.9     05-25    142  |  104  |  35<H>  ----------------------------<  83  3.9   |  28  |  1.74<H>    Ca    8.4      25 May 2018 06:19  Phos  3.6     05-25  Mg     2.4     05-25      proBNP:   Lipid Profile:   HgA1c:   TSH:     TELE:  EKG:

## 2018-05-25 NOTE — PROGRESS NOTE ADULT - ASSESSMENT
Pleural effusion  unilateral with clear on right side , atypical for chf, but as per pulm improving and could not be drained   consider CT of chest without contrast   fu with pulm  on low dose lasix, crt is going up , if it continues to go up then would hold lasix     Severe/ critical  AS   Chronic systolic chf severe   has bradycardia, will DC BB  hold of to ace for now   Dr Perez called to eval for TAVR

## 2018-05-25 NOTE — CONSULT NOTE ADULT - PROBLEM SELECTOR RECOMMENDATION 9
Currently appears relatively euvolemic. Recommend outpatient TAVR evaluation at [facility].

## 2018-05-25 NOTE — PROGRESS NOTE ADULT - SUBJECTIVE AND OBJECTIVE BOX
CC: Patient is a 86y old  Male who presents with a chief complaint of Came for c/o SOB (22 May 2018 15:05)    ***    SUBJECTIVE / OVERNIGHT EVENTS: Feels better, wants to go home. Thinks he is back at his baseline Denies headache, weakness, malaise, fevers, chills, visual changes, chest pain, dyspnea, cough, abdominal pain, nausea, vomiting, diarrhea, constipation, dysuria, hematuria, polyuria, pruritis, joint pain    MEDICATIONS  (STANDING):  furosemide    Tablet 20 milliGRAM(s) Oral daily  heparin  Injectable 5000 Unit(s) SubCutaneous every 8 hours    MEDICATIONS  (PRN):      Vital Signs Last 24 Hrs  T(C): 36.4 (25 May 2018 14:11), Max: 36.6 (24 May 2018 23:00)  T(F): 97.6 (25 May 2018 14:11), Max: 97.8 (24 May 2018 23:00)  HR: 63 (25 May 2018 14:11) (43 - 66)  BP: 110/64 (25 May 2018 14:11) (107/58 - 116/66)  BP(mean): --  RR: 16 (25 May 2018 14:11) (16 - 18)  SpO2: 94% (25 May 2018 14:11) (94% - 98%)  CAPILLARY BLOOD GLUCOSE            PHYSICAL EXAM:  GENERAL: NAD, well-developed  HEAD:  Atraumatic, Normocephalic  EYES: EOMI, PERRLA, conjunctiva and sclera clear  NECK: Supple, No JVD  CHEST/LUNG: Clear to auscultation bilaterally; No wheeze  HEART: Regular rate and rhythm; No murmurs, rubs, or gallops  ABDOMEN: Soft, Nontender, Nondistended; Bowel sounds present  EXTREMITIES:  2+ Peripheral Pulses, No clubbing, cyanosis, or edema  PSYCH: AAOx3  NEUROLOGY: non-focal  SKIN: No rashes or lesions    LABS:                        12.5   5.65  )-----------( 138      ( 25 May 2018 06:19 )             39.9     05-25    142  |  104  |  35<H>  ----------------------------<  83  3.9   |  28  |  1.74<H>    Ca    8.4      25 May 2018 06:19  Phos  3.6     05-25  Mg     2.4     05-25      PT/INR - ( 25 May 2018 06:19 )   PT: 11.2 SEC;   INR: 1.01          PTT - ( 25 May 2018 06:19 )  PTT:25.6 SEC          RADIOLOGY & ADDITIONAL TESTS:    Imaging Personally Reviewed:    Consultant(s) Notes Reviewed:      Care Discussed with Consultants/Other Providers: CC: Patient is a 86y old  Male who presents with a chief complaint of Came for c/o SOB (22 May 2018 15:05)    ***Interview conducted with  - Tammy Ponce***    SUBJECTIVE / OVERNIGHT EVENTS: Feels better, wants to go home. Thinks he is back at his baseline Denies headache, weakness, malaise, fevers, chills, visual changes, chest pain, dyspnea, cough, abdominal pain, nausea, vomiting, diarrhea, constipation, dysuria, hematuria, polyuria, pruritis, joint pain    I had lengthy discussion with patient talking about need for Diagnostic Catheterization and possible TAVR given Severe AS and Decreased EF. Patient will decide about what to do with procedures prior to making decision.     MEDICATIONS  (STANDING):  furosemide    Tablet 20 milliGRAM(s) Oral daily  heparin  Injectable 5000 Unit(s) SubCutaneous every 8 hours    MEDICATIONS  (PRN):      Vital Signs Last 24 Hrs  T(C): 36.4 (25 May 2018 14:11), Max: 36.6 (24 May 2018 23:00)  T(F): 97.6 (25 May 2018 14:11), Max: 97.8 (24 May 2018 23:00)  HR: 63 (25 May 2018 14:11) (43 - 66)  BP: 110/64 (25 May 2018 14:11) (107/58 - 116/66)  BP(mean): --  RR: 16 (25 May 2018 14:11) (16 - 18)  SpO2: 94% (25 May 2018 14:11) (94% - 98%)  CAPILLARY BLOOD GLUCOSE      PHYSICAL EXAM:  GENERAL: NAD, well-developed, mute, communicates via sign language   HEAD:  Atraumatic, Normocephalic  EYES: EOMI, PERRLA, conjunctiva and sclera clear  NECK: Supple, No JVD  CHEST/LUNG: Clear to auscultation bilaterally; No wheeze  HEART: Regular rate and rhythm; 4/6 systolic murmur loudest in 2nd R ICS with radiation to carotids, No rubs, or gallops  ABDOMEN: Soft, Nontender, Nondistended; Bowel sounds present  EXTREMITIES:  2+ Peripheral Pulses, No clubbing, cyanosis, or edema  PSYCH: AAOx3  NEUROLOGY: non-focal  SKIN: No rashes or lesions    LABS:                        12.5   5.65  )-----------( 138      ( 25 May 2018 06:19 )             39.9     05-25    142  |  104  |  35<H>  ----------------------------<  83  3.9   |  28  |  1.74<H>    Ca    8.4      25 May 2018 06:19  Phos  3.6     05-25  Mg     2.4     05-25      PT/INR - ( 25 May 2018 06:19 )   PT: 11.2 SEC;   INR: 1.01          PTT - ( 25 May 2018 06:19 )  PTT:25.6 SEC          RADIOLOGY & ADDITIONAL TESTS:    Imaging Personally Reviewed: Echo, CXR    Consultant(s) Notes Reviewed:  Pulmonary and Cardiology    Care Discussed with Consultants/Other Providers: Dr. Perez (interventional cards), Dr. Carreon (cardiology) CC: Patient is a 86y old  Male who presents with a chief complaint of Came for c/o SOB (22 May 2018 15:05)    ***Interview conducted with  - Bettie Ponce***    SUBJECTIVE / OVERNIGHT EVENTS: Feels better, wants to go home. Thinks he is back at his baseline Denies headache, weakness, malaise, fevers, chills, visual changes, chest pain, dyspnea, cough, abdominal pain, nausea, vomiting, diarrhea, constipation, dysuria, hematuria, polyuria, pruritis, joint pain    I had lengthy discussion with patient talking about need for Diagnostic Catheterization and possible TAVR given Severe AS and Decreased EF. Patient will decide about what to do with procedures prior to making decision.     MEDICATIONS  (STANDING):  furosemide    Tablet 20 milliGRAM(s) Oral daily  heparin  Injectable 5000 Unit(s) SubCutaneous every 8 hours    MEDICATIONS  (PRN):      Vital Signs Last 24 Hrs  T(C): 36.4 (25 May 2018 14:11), Max: 36.6 (24 May 2018 23:00)  T(F): 97.6 (25 May 2018 14:11), Max: 97.8 (24 May 2018 23:00)  HR: 63 (25 May 2018 14:11) (43 - 66)  BP: 110/64 (25 May 2018 14:11) (107/58 - 116/66)  BP(mean): --  RR: 16 (25 May 2018 14:11) (16 - 18)  SpO2: 94% (25 May 2018 14:11) (94% - 98%)  CAPILLARY BLOOD GLUCOSE      PHYSICAL EXAM:  GENERAL: NAD, well-developed, mute, communicates via sign language, able to get up from bed and ambulate to the bathroom without any motor or respiratory difficulties   HEAD:  Atraumatic, Normocephalic  EYES: EOMI, PERRLA, conjunctiva and sclera clear  NECK: Supple, No JVD  CHEST/LUNG: decreased bibasilar breath sounds  HEART: Regular rate and rhythm; 4/6 systolic murmur loudest in 2nd R ICS with radiation to carotids, No rubs, or gallops  ABDOMEN: Soft, Nontender, Nondistended; Bowel sounds present  EXTREMITIES:  2+ Peripheral Pulses, No clubbing, cyanosis, or edema  PSYCH: AAOx3  NEUROLOGY: non-focal  SKIN: No rashes or lesions    LABS:                        12.5   5.65  )-----------( 138      ( 25 May 2018 06:19 )             39.9     05-25    142  |  104  |  35<H>  ----------------------------<  83  3.9   |  28  |  1.74<H>    Ca    8.4      25 May 2018 06:19  Phos  3.6     05-25  Mg     2.4     05-25      PT/INR - ( 25 May 2018 06:19 )   PT: 11.2 SEC;   INR: 1.01          PTT - ( 25 May 2018 06:19 )  PTT:25.6 SEC          RADIOLOGY & ADDITIONAL TESTS:    Imaging Personally Reviewed: Echo, CXR    Consultant(s) Notes Reviewed:  Pulmonary and Cardiology    Care Discussed with Consultants/Other Providers: Dr. Perez (interventional cards), Dr. Carreon (cardiology) CC: Patient is a 86y old  Male who presents with a chief complaint of Came for c/o SOB (22 May 2018 15:05)    ***Interview conducted with  - Bettie Ponce***    SUBJECTIVE / OVERNIGHT EVENTS: Feels better, wants to go home. Thinks he is back at his baseline Denies headache, weakness, malaise, fevers, chills, visual changes, chest pain, dyspnea, cough, abdominal pain, nausea, vomiting, diarrhea, constipation, dysuria, hematuria, polyuria, pruritis, joint pain    I had lengthy discussion with patient talking about need for Diagnostic Catheterization and possible TAVR given Severe AS and Decreased EF. Patient will discuss with Interventional Cardiologist first about procedures prior to making decision.     MEDICATIONS  (STANDING):  furosemide    Tablet 20 milliGRAM(s) Oral daily  heparin  Injectable 5000 Unit(s) SubCutaneous every 8 hours    MEDICATIONS  (PRN):      Vital Signs Last 24 Hrs  T(C): 36.4 (25 May 2018 14:11), Max: 36.6 (24 May 2018 23:00)  T(F): 97.6 (25 May 2018 14:11), Max: 97.8 (24 May 2018 23:00)  HR: 63 (25 May 2018 14:11) (43 - 66)  BP: 110/64 (25 May 2018 14:11) (107/58 - 116/66)  BP(mean): --  RR: 16 (25 May 2018 14:11) (16 - 18)  SpO2: 94% (25 May 2018 14:11) (94% - 98%)  CAPILLARY BLOOD GLUCOSE      PHYSICAL EXAM:  GENERAL: NAD, well-developed, mute, communicates via sign language, able to get up from bed and ambulate to the bathroom without any motor or respiratory difficulties   HEAD:  Atraumatic, Normocephalic  EYES: EOMI, PERRLA, conjunctiva and sclera clear  NECK: Supple, No JVD  CHEST/LUNG: decreased bibasilar breath sounds  HEART: Regular rate and rhythm; 4/6 systolic murmur loudest in 2nd R ICS with radiation to carotids, No rubs, or gallops  ABDOMEN: Soft, Nontender, Nondistended; Bowel sounds present  EXTREMITIES:  2+ Peripheral Pulses, No clubbing, cyanosis, or edema  PSYCH: AAOx3  NEUROLOGY: non-focal  SKIN: No rashes or lesions    LABS:                        12.5   5.65  )-----------( 138      ( 25 May 2018 06:19 )             39.9     05-25    142  |  104  |  35<H>  ----------------------------<  83  3.9   |  28  |  1.74<H>    Ca    8.4      25 May 2018 06:19  Phos  3.6     05-25  Mg     2.4     05-25      PT/INR - ( 25 May 2018 06:19 )   PT: 11.2 SEC;   INR: 1.01          PTT - ( 25 May 2018 06:19 )  PTT:25.6 SEC          RADIOLOGY & ADDITIONAL TESTS:    Imaging Personally Reviewed: Echo, CXR    Consultant(s) Notes Reviewed:  Pulmonary and Cardiology    Care Discussed with Consultants/Other Providers: Dr. Perez (interventional cards), Dr. Carreon (cardiology)

## 2018-05-25 NOTE — PROGRESS NOTE ADULT - PROBLEM SELECTOR PLAN 1
EF 23%, will need ischemic workup via diagnostic catheterization, appreciate Cardiology recommendations, c/w diuresis, but will need to titrate doses given CASH on CKD. Continue with telemetry monitoring.

## 2018-05-25 NOTE — PROGRESS NOTE ADULT - ASSESSMENT
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

## 2018-05-26 ENCOUNTER — TRANSCRIPTION ENCOUNTER (OUTPATIENT)
Age: 83
End: 2018-05-26

## 2018-05-26 LAB
BUN SERPL-MCNC: 32 MG/DL — HIGH (ref 7–23)
CALCIUM SERPL-MCNC: 8.5 MG/DL — SIGNIFICANT CHANGE UP (ref 8.4–10.5)
CHLORIDE SERPL-SCNC: 103 MMOL/L — SIGNIFICANT CHANGE UP (ref 98–107)
CO2 SERPL-SCNC: 25 MMOL/L — SIGNIFICANT CHANGE UP (ref 22–31)
CREAT SERPL-MCNC: 1.57 MG/DL — HIGH (ref 0.5–1.3)
GLUCOSE SERPL-MCNC: 84 MG/DL — SIGNIFICANT CHANGE UP (ref 70–99)
HCT VFR BLD CALC: 38.9 % — LOW (ref 39–50)
HGB BLD-MCNC: 12.6 G/DL — LOW (ref 13–17)
MAGNESIUM SERPL-MCNC: 2.4 MG/DL — SIGNIFICANT CHANGE UP (ref 1.6–2.6)
MCHC RBC-ENTMCNC: 32.4 % — SIGNIFICANT CHANGE UP (ref 32–36)
MCHC RBC-ENTMCNC: 33 PG — SIGNIFICANT CHANGE UP (ref 27–34)
MCV RBC AUTO: 101.8 FL — HIGH (ref 80–100)
NRBC # FLD: 0 — SIGNIFICANT CHANGE UP
PLATELET # BLD AUTO: 140 K/UL — LOW (ref 150–400)
PMV BLD: 13.2 FL — HIGH (ref 7–13)
POTASSIUM SERPL-MCNC: 3.8 MMOL/L — SIGNIFICANT CHANGE UP (ref 3.5–5.3)
POTASSIUM SERPL-SCNC: 3.8 MMOL/L — SIGNIFICANT CHANGE UP (ref 3.5–5.3)
RBC # BLD: 3.82 M/UL — LOW (ref 4.2–5.8)
RBC # FLD: 13.2 % — SIGNIFICANT CHANGE UP (ref 10.3–14.5)
SODIUM SERPL-SCNC: 142 MMOL/L — SIGNIFICANT CHANGE UP (ref 135–145)
WBC # BLD: 5.69 K/UL — SIGNIFICANT CHANGE UP (ref 3.8–10.5)
WBC # FLD AUTO: 5.69 K/UL — SIGNIFICANT CHANGE UP (ref 3.8–10.5)

## 2018-05-26 PROCEDURE — 99233 SBSQ HOSP IP/OBS HIGH 50: CPT

## 2018-05-26 PROCEDURE — 71250 CT THORAX DX C-: CPT | Mod: 26

## 2018-05-26 RX ADMIN — Medication 20 MILLIGRAM(S): at 05:35

## 2018-05-26 RX ADMIN — HEPARIN SODIUM 5000 UNIT(S): 5000 INJECTION INTRAVENOUS; SUBCUTANEOUS at 22:06

## 2018-05-26 RX ADMIN — HEPARIN SODIUM 5000 UNIT(S): 5000 INJECTION INTRAVENOUS; SUBCUTANEOUS at 05:36

## 2018-05-26 RX ADMIN — HEPARIN SODIUM 5000 UNIT(S): 5000 INJECTION INTRAVENOUS; SUBCUTANEOUS at 13:24

## 2018-05-26 NOTE — DISCHARGE NOTE ADULT - CARE PROVIDER_API CALL
Sarthak Perez), Cardiovascular Disease; Internal Medicine; Interventional Cardiology  [id number] [place] [address]  [place] [address]  Phone: [phone]  Fax: [phone]

## 2018-05-26 NOTE — PROGRESS NOTE ADULT - SUBJECTIVE AND OBJECTIVE BOX
Subjective: Patient seen and examined. No new events except as noted.     SUBJECTIVE/ROS:    No chest pain, dyspnea, palpitation, or dizziness.     MEDICATIONS:  MEDICATIONS  (STANDING):  furosemide    Tablet 20 milliGRAM(s) Oral daily  heparin  Injectable 5000 Unit(s) SubCutaneous every 8 hours      PHYSICAL EXAM:  T(C): 36.8 (05-26-18 @ 05:35), Max: 36.8 (05-26-18 @ 05:35)  HR: 64 (05-26-18 @ 05:35) (53 - 64)  BP: 116/74 (05-26-18 @ 05:35) (95/48 - 116/74)  RR: 18 (05-26-18 @ 05:35) (16 - 18)  SpO2: 94% (05-26-18 @ 05:35) (94% - 97%)  Wt(kg): --  I&O's Summary    25 May 2018 07:01  -  26 May 2018 07:00  --------------------------------------------------------  IN: 200 mL / OUT: 350 mL / NET: -150 mL        JVP: Normal  Neck: supple  Lung: clear   CV: S1 S2 , Murmur: pos zandra   Abd: soft  Ext: No edema  neuro: Awake / alert  Psych: flat affect  Skin: normal     LABS/DATA:    CARDIAC MARKERS:                                12.6   5.69  )-----------( 140      ( 26 May 2018 06:55 )             38.9     05-25    142  |  104  |  35<H>  ----------------------------<  83  3.9   |  28  |  1.74<H>    Ca    8.4      25 May 2018 06:19  Phos  3.6     05-25  Mg     2.4     05-25      proBNP:   Lipid Profile:   HgA1c:   TSH:     TELE:  EKG:

## 2018-05-26 NOTE — PROGRESS NOTE ADULT - ASSESSMENT
severe AS  severe  LV systolic dysfunction     off BB due to bradycardia  on lasix  monitor crt   had NSVT on tele , lifevest is recommended, [employer] rep is contacted  TAVR and work up as outpt as per Dr. PADILLA evaluation

## 2018-05-26 NOTE — PROGRESS NOTE ADULT - ASSESSMENT
86M hx of severe AS, CKD stage 3, deaf/mute p/w dizziness x 1 week, found to have small-moderate left pleural effusion and admitted for acute CHF (EF 23%)      1. Acute systolic congestive heart failure.     EF 23%, will need ischemic workup via diagnostic catheterization,   cardio f/u appreciated   cardiac work up as out patient   2. Severe aortic stenosis.     Patient with severe AS. Dr. Perez to evaluate for TAVR as patient came with dyspnea and left pleural effusion likely due to Critical AS/Acute CHF  cardiac workup as out patient     3. Deafmutism.     Must communicate with  for all medical explanations.     4. Pleural effusion.     s/p diuretics for pleural effusion. As per pulmonary effusion to small to be drained but responsive to diuretics. Continue to monitor.     5. Acute kidney injury superimposed on CKD.    SCr 1.74, slowly increasing creatinine, would continue to monitor daily and hold Lasix if SCr continues to climb. Unclear baseline however on admission SCr was lower than it is today.      GI and  DVT prophylaxis

## 2018-05-26 NOTE — DISCHARGE NOTE ADULT - MEDICATION SUMMARY - MEDICATIONS TO TAKE
I will START or STAY ON the medications listed below when I get home from the hospital:    furosemide 20 mg oral tablet  -- 1 tab(s) by mouth once a day  -- Indication: For Acute systolic congestive heart failure    docusate sodium 100 mg oral capsule  -- 1 cap(s) by mouth once a day  -- Indication: For Constipation    senna oral tablet  -- 2 tab(s) by mouth once a day (at bedtime)  -- Indication: For Constipation

## 2018-05-26 NOTE — DISCHARGE NOTE ADULT - CARE PLAN
Principal Discharge DX:	Acute systolic congestive heart failure  Goal:	To relieve and prevent worsening symptoms associated with congestive heart failure, to improve quality of life, and to treat underlying conditions such as coronary heart disease, high blood pressure, or diabetes, and to maintain euvolemia.  Assessment and plan of treatment:	EF 23%, will need ischemic workup via diagnostic catheterization. Low salt diet, fluid restriction to 1500 ml daily, monitor your fluid intake and weight daily, exercise as tolerated 30 minutes daily, and follow up with your physician within 1 to 2 weeks.  Secondary Diagnosis:	Severe aortic stenosis  Assessment and plan of treatment:	[facility] are being transferred to [facility] for TAVR evaluation.  Secondary Diagnosis:	Deafmutism  Assessment and plan of treatment:	Must communicate with  for all medical explanations. Marcie  Secondary Diagnosis:	Pleural effusion  Secondary Diagnosis:	CASH (acute kidney injury) Principal Discharge DX:	Acute systolic congestive heart failure  Goal:	To relieve and prevent worsening symptoms associated with congestive heart failure, to improve quality of life, and to treat underlying conditions such as coronary heart disease, high blood pressure, or diabetes, and to maintain euvolemia.  Assessment and plan of treatment:	EF 23%, will need ischemic workup via diagnostic catheterization. Low salt diet, fluid restriction to 1500 ml daily, monitor your fluid intake and weight daily, exercise as tolerated 30 minutes daily, and follow up with your physician within 1 to 2 weeks.  Secondary Diagnosis:	Severe aortic stenosis  Assessment and plan of treatment:	You are being transferred to [facility] for TAVR evaluation.  Secondary Diagnosis:	Deafmutism  Assessment and plan of treatment:	Must communicate with  for all medical explanations. Marcie is the  on [facility] campus and can be paged at [phone]  Secondary Diagnosis:	Pleural effusion  Assessment and plan of treatment:	Continue Lasix 20mg PO daily.  Secondary Diagnosis:	CASH (acute kidney injury)  Assessment and plan of treatment:	Monitor BMP.

## 2018-05-26 NOTE — PROGRESS NOTE ADULT - SUBJECTIVE AND OBJECTIVE BOX
Patient is a 86y old  Male who presents with a chief complaint of Came for c/o SOB (22 May 2018 15:05)    patient seen and examine at bed side   feels better       MEDICATIONS  (STANDING):  furosemide    Tablet 20 milliGRAM(s) Oral daily  heparin  Injectable 5000 Unit(s) SubCutaneous every 8 hours    MEDICATIONS  (PRN):    Vital Signs Last 24 Hrs  T(C): 36.8 (26 May 2018 05:35), Max: 36.8 (26 May 2018 05:35)  T(F): 98.2 (26 May 2018 05:35), Max: 98.2 (26 May 2018 05:35)  HR: 64 (26 May 2018 05:35) (53 - 64)  BP: 116/74 (26 May 2018 05:35) (95/48 - 116/74)  BP(mean): --  RR: 18 (26 May 2018 05:35) (16 - 18)  SpO2: 94% (26 May 2018 05:35) (94% - 97%)      PHYSICAL EXAM:  GENERAL: NAD, well-developed, mute, communicates via sign language, able to get up from bed and ambulate to the bathroom without any motor or respiratory difficulties   HEAD:  Atraumatic, Normocephalic  EYES: EOMI, PERRLA, conjunctiva and sclera clear  NECK: Supple, No JVD  CHEST/LUNG: decreased bibasilar breath sounds  HEART: Regular rate and rhythm; 4/6 systolic murmur loudest in 2nd R ICS with radiation to carotids, No rubs, or gallops  ABDOMEN: Soft, Nontender, Nondistended; Bowel sounds present  EXTREMITIES:  2+ Peripheral Pulses, No clubbing, cyanosis, or edema  PSYCH: AAOx3  NEUROLOGY: non-focal  SKIN: No rashes or lesions    LABS:                                    12.6   5.69  )-----------( 140      ( 26 May 2018 06:55 )             38.9       05-26    142  |  103  |  32<H>  ----------------------------<  84  3.8   |  25  |  1.57<H>    Ca    8.5      26 May 2018 06:02  Phos  3.6     05-25  Mg     2.4     05-26          PT/INR - ( 25 May 2018 06:19 )   PT: 11.2 SEC;   INR: 1.01          PTT - ( 25 May 2018 06:19 )  PTT:25.6 SEC        RADIOLOGY & ADDITIONAL TESTS:    Imaging Personally Reviewed: Echo, CXR    Consultant(s) Notes Reviewed:  Pulmonary and Cardiology    Care Discussed with Consultants/Other Providers: Dr. Perez (interventional cards), Dr. Carreon (cardiology)

## 2018-05-26 NOTE — DISCHARGE NOTE ADULT - PLAN OF CARE
To relieve and prevent worsening symptoms associated with congestive heart failure, to improve quality of life, and to treat underlying conditions such as coronary heart disease, high blood pressure, or diabetes, and to maintain euvolemia. EF 23%, will need ischemic workup via diagnostic catheterization. Low salt diet, fluid restriction to 1500 ml daily, monitor your fluid intake and weight daily, exercise as tolerated 30 minutes daily, and follow up with your physician within 1 to 2 weeks. You are being transferred to [facility] for TAVR evaluation. Must communicate with  for all medical explanations. Marcie Must communicate with  for all medical explanations. Marcie is the  on [facility] campus and can be paged at [phone] Continue Lasix 20mg PO daily. Monitor BMP.

## 2018-05-26 NOTE — DISCHARGE NOTE ADULT - HOSPITAL COURSE
86y M with PMHx of deaf/mute p/w SOB/GARCIA x 1 week. Patient was seen by urgent care and found to have a L pleural effusion on CXR. Of note, no  available at this time; hx is obtained from the ED provider note and chart review.  left for pt's son Rah at [phone]. ED provider note states patient has been having palpitations, dizziness, GARCIA/SOB x 1 week. Is not on any medications, has not seen a PCP in over 5 years. Of note patient was last hospitalized in [facility] in Sept 2016 for GIB in setting of heavy ETOH use, EGD at the time revealed one non-bleeding 6mm cratered gastric ulcer with multiple small non-bleeding erosions in gastric antrum. No stigmata of recent bleeding was found. In the duodenal bulb, there was diffusely moderately erythematous mucosa without active bleeding.     Pt was admitted for SOB/GARCIA w/ severe AS and started Lasix 20 PO and metoprolol 12.5mg BID, pulm and cards consult f/u, possible thoracentesis as per pulm for possible TAVR as per cards. Pt agreeing for TAVR w/u, thoracentesis couldn't extract any fluid, possibly may need IR guided thoracentesis now, transfer to tele in the setting of Severe AS w/ TTE. Pt was transferred to telemetry service.     Pulmonary: L sided pleural effusion appeared much smaller on bedside US after diuresis. Team attempted thoracentesis but was unsuccessful, no pleural fluid able to be drained. The fact that pleural effusion smaller after diuresis suggesting heart failure is most likely etiology especially given TTE findings yesterday.    Dr. Carreon ( Cardiology) following, echocardiogram was done which revealed EF 23% Calcified trileaflet aortic valve with decreased opening. Peak transaortic valve gradient equals 106 mm Hg, mean transaortic valve gradient equals 53 mm Hg, estimated aortic valve area equals 0.6 sqcm (by continuity equation), consistent with severe aortic stenosis. Mild aortic regurgitation. Severe global left ventricular systolic dysfunction.  Right ventricular enlargement with decreased right ventricular systolic function.    Cardiology f/u: off BB due to bradycardia, on lasix, monitor Cr, had NSVT on tele, lifevest is recommended, [employer] rep is contacted, TAVR and work up as outpt as per Dr. Perez evaluation.    *******incomplete 85 y/o M with PMHx of deaf/mute, severe aortic stenosis, CKD stage 3 presented to the ED with SOB and GARCIA x 1 week. Patient was seen by urgent care and found to have a L pleural effusion on CXR. Pt was admitted to telemetry. Echo was done which revealed EF 23%, severe AS, mild aortic regurgitation, severe global LVSD, and decreased RVSF. Patient was started on Lasix 20mg PO and metoprolol 12.5mg BID. Pulmonary and cardiology were following patient during this admission. Pulm attempted thoracentesis but was unsuccessful, no pleural fluid able to be drained. Dr. Perez evaluated patient for TAVR and recommended outpatient TAVR workup, but family preferred transfer to [facility] for TAVR.   Dr. Carreon ( Cardiology) following, echocardiogram was done which revealed EF 23% Calcified trileaflet aortic valve with decreased opening. Peak transaortic valve gradient equals 106 mm Hg, mean transaortic valve gradient equals 53 mm Hg, estimated aortic valve area equals 0.6 sqcm (by continuity equation), consistent with severe aortic stenosis. Mild aortic regurgitation. Severe global left ventricular systolic dysfunction.  Right ventricular enlargement with decreased right ventricular systolic function.    Cardiology f/u: off BB due to bradycardia, on lasix, monitor Cr, had NSVT on tele, lifevest is recommended, [employer] rep is contacted, TAVR and work up as outpt as per Dr. Perez evaluation.    *******incomplete 85 y/o M with PMHx of deaf/mute, severe aortic stenosis, CKD stage 3 presented to the ED with SOB and GARCIA x 1 week. Patient was seen by urgent care and found to have a L pleural effusion on CXR. Pt was admitted to telemetry for acute systolic CHF. Echo was done which revealed EF 23%, severe AS, mild aortic regurgitation, severe global LVSD, and decreased RVSF. Patient was started on Lasix 20mg PO and metoprolol 12.5mg BID. Pulmonary and cardiology were following patient during this admission. Pulm attempted thoracentesis but was unsuccessful, no pleural fluid able to be drained. Dr. Perez evaluated patient for TAVR and recommended outpatient TAVR workup, but family preferred transfer to [facility] for TAVR. BB was discontinued due to bradycardia. On telemetry, patient noted to have NSVT, LifeVest was recommended and Tamika vega was contacted, but patient refused. Patient is concerned about receiving anesthesia as he reports having a bad experience when he was 14 years old that left him sick for 4 years. He prefers sedation without anesthesia if possible. Patient uses sign language interpreters, Marcie can be paged at [phone]. 87 y/o M with PMHx of deaf/mute, severe aortic stenosis, CKD stage 3 presented to the ED with SOB and GARCIA x 1 week. Patient was seen by urgent care and found to have a L pleural effusion on CXR. Pt was admitted to telemetry for acute systolic CHF. Echo was done which revealed EF 23%, severe AS, mild aortic regurgitation, severe global LVSD, and decreased RVSF. Patient was started on Lasix 20mg PO and metoprolol 12.5mg BID. Pulmonary and cardiology were following patient during this admission. Pulm attempted thoracentesis but was unsuccessful, no pleural fluid able to be drained. Dr. Perez evaluated patient for TAVR and recommended outpatient TAVR workup, but family preferred transfer to [facility] for TAVR. BB was discontinued due to bradycardia. On telemetry, patient noted to have NSVT, LifeVest was recommended and Tamika vega was contacted, but patient refused. Patient is concerned about receiving anesthesia as he reports having a bad experience when he was 14 years old that left him sick for 4 years. He prefers sedation without anesthesia if possible. Patient uses sign language interpreters, Marcie can be paged at [phone].  Patient is being transferred to [facility] for TAVR evaluation.

## 2018-05-26 NOTE — DISCHARGE NOTE ADULT - PATIENT PORTAL LINK FT
You can access the [facility] Patient Portal, offered by [facility], by registering with the following website: [email or web address]

## 2018-05-27 LAB
BUN SERPL-MCNC: 32 MG/DL — HIGH (ref 7–23)
CALCIUM SERPL-MCNC: 8.4 MG/DL — SIGNIFICANT CHANGE UP (ref 8.4–10.5)
CHLORIDE SERPL-SCNC: 104 MMOL/L — SIGNIFICANT CHANGE UP (ref 98–107)
CO2 SERPL-SCNC: 28 MMOL/L — SIGNIFICANT CHANGE UP (ref 22–31)
CREAT SERPL-MCNC: 1.57 MG/DL — HIGH (ref 0.5–1.3)
GLUCOSE SERPL-MCNC: 84 MG/DL — SIGNIFICANT CHANGE UP (ref 70–99)
HCT VFR BLD CALC: 39 % — SIGNIFICANT CHANGE UP (ref 39–50)
HGB BLD-MCNC: 12.7 G/DL — LOW (ref 13–17)
MAGNESIUM SERPL-MCNC: 2.4 MG/DL — SIGNIFICANT CHANGE UP (ref 1.6–2.6)
MCHC RBC-ENTMCNC: 32.3 PG — SIGNIFICANT CHANGE UP (ref 27–34)
MCHC RBC-ENTMCNC: 32.6 % — SIGNIFICANT CHANGE UP (ref 32–36)
MCV RBC AUTO: 99.2 FL — SIGNIFICANT CHANGE UP (ref 80–100)
NRBC # FLD: 0 — SIGNIFICANT CHANGE UP
PLATELET # BLD AUTO: 139 K/UL — LOW (ref 150–400)
PMV BLD: 13 FL — SIGNIFICANT CHANGE UP (ref 7–13)
POTASSIUM SERPL-MCNC: 4.1 MMOL/L — SIGNIFICANT CHANGE UP (ref 3.5–5.3)
POTASSIUM SERPL-SCNC: 4.1 MMOL/L — SIGNIFICANT CHANGE UP (ref 3.5–5.3)
RBC # BLD: 3.93 M/UL — LOW (ref 4.2–5.8)
RBC # FLD: 13 % — SIGNIFICANT CHANGE UP (ref 10.3–14.5)
SODIUM SERPL-SCNC: 141 MMOL/L — SIGNIFICANT CHANGE UP (ref 135–145)
WBC # BLD: 4.94 K/UL — SIGNIFICANT CHANGE UP (ref 3.8–10.5)
WBC # FLD AUTO: 4.94 K/UL — SIGNIFICANT CHANGE UP (ref 3.8–10.5)

## 2018-05-27 PROCEDURE — 99233 SBSQ HOSP IP/OBS HIGH 50: CPT

## 2018-05-27 RX ADMIN — Medication 20 MILLIGRAM(S): at 05:34

## 2018-05-27 RX ADMIN — HEPARIN SODIUM 5000 UNIT(S): 5000 INJECTION INTRAVENOUS; SUBCUTANEOUS at 21:58

## 2018-05-27 RX ADMIN — HEPARIN SODIUM 5000 UNIT(S): 5000 INJECTION INTRAVENOUS; SUBCUTANEOUS at 05:34

## 2018-05-27 RX ADMIN — HEPARIN SODIUM 5000 UNIT(S): 5000 INJECTION INTRAVENOUS; SUBCUTANEOUS at 12:40

## 2018-05-27 NOTE — PROGRESS NOTE ADULT - ASSESSMENT
severe AS  severe  LV systolic dysfunction     off BB due to bradycardia  on lasix  monitor crt   had NSVT on tele , lifevest is recommended, pt is refusing   discussed with pt and family , they would like to be transferred to [facility] for TAVR and work up   this was discussed with dr child

## 2018-05-27 NOTE — PROGRESS NOTE ADULT - SUBJECTIVE AND OBJECTIVE BOX
Subjective: Patient seen and examined. No new events except as noted.     SUBJECTIVE/ROS:  feels ok       MEDICATIONS:  MEDICATIONS  (STANDING):  furosemide    Tablet 20 milliGRAM(s) Oral daily  heparin  Injectable 5000 Unit(s) SubCutaneous every 8 hours      PHYSICAL EXAM:  T(C): 36.4 (05-27-18 @ 05:34), Max: 36.8 (05-26-18 @ 22:04)  HR: 66 (05-27-18 @ 05:34) (63 - 74)  BP: 113/60 (05-27-18 @ 05:34) (107/57 - 122/69)  RR: 18 (05-27-18 @ 05:34) (18 - 18)  SpO2: 96% (05-27-18 @ 05:34) (95% - 96%)  Wt(kg): --  I&O's Summary    26 May 2018 07:01  -  27 May 2018 07:00  --------------------------------------------------------  IN: 240 mL / OUT: 1000 mL / NET: -760 mL    27 May 2018 07:01  -  27 May 2018 11:47  --------------------------------------------------------  IN: 0 mL / OUT: 250 mL / NET: -250 mL        JVP: Normal  Neck: supple  Lung: clear   CV: S1 S2 , Murmur: pos zandra   Abd: soft  Ext: No edema  neuro: Awake / alert  Psych: flat affect  Skin: normal     LABS/DATA:    CARDIAC MARKERS:                                12.7   4.94  )-----------( 139      ( 27 May 2018 07:05 )             39.0     05-27    141  |  104  |  32<H>  ----------------------------<  84  4.1   |  28  |  1.57<H>    Ca    8.4      27 May 2018 07:05  Mg     2.4     05-27      proBNP:   Lipid Profile:   HgA1c:   TSH:     TELE:  EKG:

## 2018-05-27 NOTE — PROGRESS NOTE ADULT - ASSESSMENT
86M hx of severe AS, CKD stage 3, deaf/mute p/w dizziness x 1 week, found to have small-moderate left pleural effusion and admitted for acute CHF (EF 23%)      1. Acute systolic congestive heart failure.     EF 23%, will need ischemic workup via diagnostic catheterization,   cardio f/u appreciated   possible transfer to [facility] for TAVR  2. Severe aortic stenosis.     Patient with severe AS. Dr. Perez to evaluate for TAVR as patient came with dyspnea and left pleural effusion likely due to Critical AS/Acute CHF  cardiac workup as out patient     3. Deafmutism.     Must communicate with  for all medical explanations.     4. Pleural effusion.     s/p diuretics for pleural effusion. As per pulmonary effusion to small to be drained but responsive to diuretics. Continue to monitor.     5. Acute kidney injury superimposed on CKD.    SCr 1.74, slowly increasing creatinine, would continue to monitor daily and hold Lasix if SCr continues to climb. Unclear baseline however on admission SCr was lower than it is today.      GI and  DVT prophylaxis

## 2018-05-27 NOTE — PROGRESS NOTE ADULT - SUBJECTIVE AND OBJECTIVE BOX
Patient is a 86y old  Male who presents with a chief complaint of Came for c/o SOB (22 May 2018 15:05)    patient seen and examine at bed side   feels better   communication done with patient with writing and he is agree with procedure  ut need sign language to explain the detail of procedure  family want to do work up in patient       MEDICATIONS  (STANDING):  furosemide    Tablet 20 milliGRAM(s) Oral daily  heparin  Injectable 5000 Unit(s) SubCutaneous every 8 hours    MEDICATIONS  (PRN):      Vital Signs Last 24 Hrs  T(C): 36.3 (27 May 2018 13:34), Max: 36.8 (26 May 2018 22:04)  T(F): 97.3 (27 May 2018 13:34), Max: 98.2 (26 May 2018 22:04)  HR: 81 (27 May 2018 13:34) (63 - 81)  BP: 113/73 (27 May 2018 13:34) (107/57 - 122/69)  BP(mean): --  RR: 18 (27 May 2018 13:34) (18 - 18)  SpO2: 92% (27 May 2018 13:34) (92% - 96%)    PHYSICAL EXAM:  GENERAL: NAD, well-developed, mute, communicates via sign language, able to get up from bed and ambulate to the bathroom without any motor or respiratory difficulties   HEAD:  Atraumatic, Normocephalic  EYES: EOMI, PERRLA, conjunctiva and sclera clear  NECK: Supple, No JVD  CHEST/LUNG: decreased bibasilar breath sounds  HEART: Regular rate and rhythm; 4/6 systolic murmur loudest in 2nd R ICS with radiation to carotids, No rubs, or gallops  ABDOMEN: Soft, Nontender, Nondistended; Bowel sounds present  EXTREMITIES:  2+ Peripheral Pulses, No clubbing, cyanosis, or edema  PSYCH: AAOx3  NEUROLOGY: non-focal  SKIN: No rashes or lesions    LABS:                                                12.7   4.94  )-----------( 139      ( 27 May 2018 07:05 )             39.0       05-27    141  |  104  |  32<H>  ----------------------------<  84  4.1   |  28  |  1.57<H>    Ca    8.4      27 May 2018 07:05  Mg     2.4     05-27              RADIOLOGY & ADDITIONAL TESTS:    Imaging Personally Reviewed: Echo, CXR    Consultant(s) Notes Reviewed:  Pulmonary and Cardiology    Care Discussed with Consultants/Other Providers: Dr. Perez (interventional cards), Dr. Carreon (cardiology)

## 2018-05-28 LAB
BUN SERPL-MCNC: 29 MG/DL — HIGH (ref 7–23)
CALCIUM SERPL-MCNC: 8.4 MG/DL — SIGNIFICANT CHANGE UP (ref 8.4–10.5)
CHLORIDE SERPL-SCNC: 103 MMOL/L — SIGNIFICANT CHANGE UP (ref 98–107)
CO2 SERPL-SCNC: 26 MMOL/L — SIGNIFICANT CHANGE UP (ref 22–31)
CREAT SERPL-MCNC: 1.5 MG/DL — HIGH (ref 0.5–1.3)
GLUCOSE SERPL-MCNC: 80 MG/DL — SIGNIFICANT CHANGE UP (ref 70–99)
HCT VFR BLD CALC: 40.4 % — SIGNIFICANT CHANGE UP (ref 39–50)
HGB BLD-MCNC: 13 G/DL — SIGNIFICANT CHANGE UP (ref 13–17)
MAGNESIUM SERPL-MCNC: 2.4 MG/DL — SIGNIFICANT CHANGE UP (ref 1.6–2.6)
MCHC RBC-ENTMCNC: 32.1 PG — SIGNIFICANT CHANGE UP (ref 27–34)
MCHC RBC-ENTMCNC: 32.2 % — SIGNIFICANT CHANGE UP (ref 32–36)
MCV RBC AUTO: 99.8 FL — SIGNIFICANT CHANGE UP (ref 80–100)
NRBC # FLD: 0 — SIGNIFICANT CHANGE UP
PLATELET # BLD AUTO: 145 K/UL — LOW (ref 150–400)
PMV BLD: 13.2 FL — HIGH (ref 7–13)
POTASSIUM SERPL-MCNC: 3.7 MMOL/L — SIGNIFICANT CHANGE UP (ref 3.5–5.3)
POTASSIUM SERPL-SCNC: 3.7 MMOL/L — SIGNIFICANT CHANGE UP (ref 3.5–5.3)
RBC # BLD: 4.05 M/UL — LOW (ref 4.2–5.8)
RBC # FLD: 13.1 % — SIGNIFICANT CHANGE UP (ref 10.3–14.5)
SODIUM SERPL-SCNC: 142 MMOL/L — SIGNIFICANT CHANGE UP (ref 135–145)
WBC # BLD: 5.1 K/UL — SIGNIFICANT CHANGE UP (ref 3.8–10.5)
WBC # FLD AUTO: 5.1 K/UL — SIGNIFICANT CHANGE UP (ref 3.8–10.5)

## 2018-05-28 PROCEDURE — 99233 SBSQ HOSP IP/OBS HIGH 50: CPT

## 2018-05-28 RX ADMIN — HEPARIN SODIUM 5000 UNIT(S): 5000 INJECTION INTRAVENOUS; SUBCUTANEOUS at 12:21

## 2018-05-28 RX ADMIN — HEPARIN SODIUM 5000 UNIT(S): 5000 INJECTION INTRAVENOUS; SUBCUTANEOUS at 05:22

## 2018-05-28 RX ADMIN — Medication 20 MILLIGRAM(S): at 05:22

## 2018-05-28 RX ADMIN — HEPARIN SODIUM 5000 UNIT(S): 5000 INJECTION INTRAVENOUS; SUBCUTANEOUS at 21:27

## 2018-05-28 NOTE — PROGRESS NOTE ADULT - SUBJECTIVE AND OBJECTIVE BOX
Subjective: Patient seen and examined. No new events except as noted.     SUBJECTIVE/ROS:    No chest pain, dyspnea, palpitation, or dizziness.     MEDICATIONS:  MEDICATIONS  (STANDING):  furosemide    Tablet 20 milliGRAM(s) Oral daily  heparin  Injectable 5000 Unit(s) SubCutaneous every 8 hours      PHYSICAL EXAM:  T(C): 36.4 (05-28-18 @ 05:21), Max: 36.8 (05-27-18 @ 21:57)  HR: 67 (05-28-18 @ 05:21) (65 - 81)  BP: 109/78 (05-28-18 @ 05:21) (109/78 - 113/73)  RR: 18 (05-28-18 @ 05:21) (18 - 18)  SpO2: 96% (05-28-18 @ 05:21) (92% - 96%)  Wt(kg): --  I&O's Summary    27 May 2018 07:01  -  28 May 2018 07:00  --------------------------------------------------------  IN: 400 mL / OUT: 1450 mL / NET: -1050 mL        JVP: Normal  Neck: supple  Lung: clear   CV: S1 S2 , Murmur:  Abd: soft  Ext: No edema  neuro: Awake / alert  Psych: flat affect  Skin: normal       LABS/DATA:    CARDIAC MARKERS:                                13.0   5.10  )-----------( 145      ( 28 May 2018 06:13 )             40.4     05-28    142  |  103  |  29<H>  ----------------------------<  80  3.7   |  26  |  1.50<H>    Ca    8.4      28 May 2018 06:13  Mg     2.4     05-28      proBNP:   Lipid Profile:   HgA1c:   TSH:     TELE:  EKG:

## 2018-05-28 NOTE — PROGRESS NOTE ADULT - SUBJECTIVE AND OBJECTIVE BOX
Patient is a 86y old  Male who presents with a chief complaint of Came for c/o SOB (22 May 2018 15:05)    patient seen and examine at bed side   feels better   possible transfer      MEDICATIONS  (STANDING):  furosemide    Tablet 20 milliGRAM(s) Oral daily  heparin  Injectable 5000 Unit(s) SubCutaneous every 8 hours    MEDICATIONS  (PRN):      Vital Signs Last 24 Hrs  T(C): 36.6 (28 May 2018 14:00), Max: 36.8 (27 May 2018 21:57)  T(F): 97.8 (28 May 2018 14:00), Max: 98.2 (27 May 2018 21:57)  HR: 81 (28 May 2018 14:00) (65 - 81)  BP: 122/68 (28 May 2018 14:00) (109/78 - 122/68)  BP(mean): --  RR: 16 (28 May 2018 14:00) (16 - 18)  SpO2: 95% (28 May 2018 14:00) (95% - 96%)    PHYSICAL EXAM:  GENERAL: NAD, well-developed, mute, communicates via sign language, able to get up from bed and ambulate to the bathroom without any motor or respiratory difficulties   HEAD:  Atraumatic, Normocephalic  EYES: EOMI, PERRLA, conjunctiva and sclera clear  NECK: Supple, No JVD  CHEST/LUNG: decreased bibasilar breath sounds  HEART: Regular rate and rhythm; 4/6 systolic murmur loudest in 2nd R ICS with radiation to carotids, No rubs, or gallops  ABDOMEN: Soft, Nontender, Nondistended; Bowel sounds present  EXTREMITIES:  2+ Peripheral Pulses, No clubbing, cyanosis, or edema  PSYCH: AAOx3  NEUROLOGY: non-focal  SKIN: No rashes or lesions    LABS:                                                  13.0   5.10  )-----------( 145      ( 28 May 2018 06:13 )             40.4         05-28    142  |  103  |  29<H>  ----------------------------<  80  3.7   |  26  |  1.50<H>    Ca    8.4      28 May 2018 06:13  Mg     2.4     05-28              RADIOLOGY & ADDITIONAL TESTS:    Imaging Personally Reviewed: Echo, CXR    Consultant(s) Notes Reviewed:  Pulmonary and Cardiology    Care Discussed with Consultants/Other Providers: Dr. Perez (interventional cards), Dr. Carreon (cardiology)

## 2018-05-28 NOTE — PROGRESS NOTE ADULT - ASSESSMENT
severe AS  severe  LV systolic dysfunction     off BB due to bradycardia  on lasix  monitor crt   had NSVT on tele , lifevest is recommended, pt is refusing   discussed with pt and family , they would like to be transferred to [facility] for TAVR and work up   this was discussed with dr child   hopefully transfer will happen tomorrow

## 2018-05-28 NOTE — PROGRESS NOTE ADULT - ASSESSMENT
86M hx of severe AS, CKD stage 3, deaf/mute p/w dizziness x 1 week, found to have small-moderate left pleural effusion and admitted for acute CHF (EF 23%)      1. Acute systolic congestive heart failure.     EF 23%, will need ischemic workup via diagnostic catheterization,   cardio f/u appreciated   possible transfer to [facility] for TAVR tomorrow  2. Severe aortic stenosis.     Patient with severe AS. Dr. Perez to evaluate for TAVR as patient came with dyspnea and left pleural effusion likely due to Critical AS/Acute CHF  cardiac workup as out patient     3. Deafmutism.     Must communicate with  for all medical explanations.     4. Pleural effusion.     s/p diuretics for pleural effusion. As per pulmonary effusion to small to be drained but responsive to diuretics. Continue to monitor.     5. Acute kidney injury superimposed on CKD.    SCr 1.74, slowly increasing creatinine, would continue to monitor daily and hold Lasix if SCr continues to climb. Unclear baseline however on admission SCr was lower than it is today.      GI and  DVT prophylaxis

## 2018-05-29 LAB
BUN SERPL-MCNC: 36 MG/DL — HIGH (ref 7–23)
CALCIUM SERPL-MCNC: 8.3 MG/DL — LOW (ref 8.4–10.5)
CHLORIDE SERPL-SCNC: 102 MMOL/L — SIGNIFICANT CHANGE UP (ref 98–107)
CO2 SERPL-SCNC: 26 MMOL/L — SIGNIFICANT CHANGE UP (ref 22–31)
CREAT SERPL-MCNC: 1.72 MG/DL — HIGH (ref 0.5–1.3)
GLUCOSE SERPL-MCNC: 83 MG/DL — SIGNIFICANT CHANGE UP (ref 70–99)
HCT VFR BLD CALC: 41.5 % — SIGNIFICANT CHANGE UP (ref 39–50)
HGB BLD-MCNC: 13.2 G/DL — SIGNIFICANT CHANGE UP (ref 13–17)
MAGNESIUM SERPL-MCNC: 2.5 MG/DL — SIGNIFICANT CHANGE UP (ref 1.6–2.6)
MCHC RBC-ENTMCNC: 31.8 % — LOW (ref 32–36)
MCHC RBC-ENTMCNC: 32.2 PG — SIGNIFICANT CHANGE UP (ref 27–34)
MCV RBC AUTO: 101.2 FL — HIGH (ref 80–100)
NRBC # FLD: 0 — SIGNIFICANT CHANGE UP
PHOSPHATE SERPL-MCNC: 3.3 MG/DL — SIGNIFICANT CHANGE UP (ref 2.5–4.5)
PLATELET # BLD AUTO: 156 K/UL — SIGNIFICANT CHANGE UP (ref 150–400)
PMV BLD: 12.8 FL — SIGNIFICANT CHANGE UP (ref 7–13)
POTASSIUM SERPL-MCNC: 3.9 MMOL/L — SIGNIFICANT CHANGE UP (ref 3.5–5.3)
POTASSIUM SERPL-SCNC: 3.9 MMOL/L — SIGNIFICANT CHANGE UP (ref 3.5–5.3)
RBC # BLD: 4.1 M/UL — LOW (ref 4.2–5.8)
RBC # FLD: 13.1 % — SIGNIFICANT CHANGE UP (ref 10.3–14.5)
SODIUM SERPL-SCNC: 140 MMOL/L — SIGNIFICANT CHANGE UP (ref 135–145)
WBC # BLD: 6.55 K/UL — SIGNIFICANT CHANGE UP (ref 3.8–10.5)
WBC # FLD AUTO: 6.55 K/UL — SIGNIFICANT CHANGE UP (ref 3.8–10.5)

## 2018-05-29 PROCEDURE — 99232 SBSQ HOSP IP/OBS MODERATE 35: CPT

## 2018-05-29 RX ORDER — DOCUSATE SODIUM 100 MG
100 CAPSULE ORAL DAILY
Qty: 0 | Refills: 0 | Status: DISCONTINUED | OUTPATIENT
Start: 2018-05-29 | End: 2018-05-31

## 2018-05-29 RX ORDER — SENNA PLUS 8.6 MG/1
2 TABLET ORAL AT BEDTIME
Qty: 0 | Refills: 0 | Status: DISCONTINUED | OUTPATIENT
Start: 2018-05-29 | End: 2018-05-31

## 2018-05-29 RX ORDER — FUROSEMIDE 40 MG
1 TABLET ORAL
Qty: 0 | Refills: 0 | COMMUNITY
Start: 2018-05-29

## 2018-05-29 RX ORDER — DOCUSATE SODIUM 100 MG
1 CAPSULE ORAL
Qty: 0 | Refills: 0 | COMMUNITY
Start: 2018-05-29

## 2018-05-29 RX ORDER — POLYETHYLENE GLYCOL 3350 17 G/17G
17 POWDER, FOR SOLUTION ORAL ONCE
Qty: 0 | Refills: 0 | Status: COMPLETED | OUTPATIENT
Start: 2018-05-29 | End: 2018-05-29

## 2018-05-29 RX ORDER — SENNA PLUS 8.6 MG/1
2 TABLET ORAL
Qty: 0 | Refills: 0 | COMMUNITY
Start: 2018-05-29

## 2018-05-29 RX ADMIN — Medication 100 MILLIGRAM(S): at 13:25

## 2018-05-29 RX ADMIN — Medication 20 MILLIGRAM(S): at 05:10

## 2018-05-29 RX ADMIN — HEPARIN SODIUM 5000 UNIT(S): 5000 INJECTION INTRAVENOUS; SUBCUTANEOUS at 21:32

## 2018-05-29 RX ADMIN — SENNA PLUS 2 TABLET(S): 8.6 TABLET ORAL at 21:32

## 2018-05-29 RX ADMIN — HEPARIN SODIUM 5000 UNIT(S): 5000 INJECTION INTRAVENOUS; SUBCUTANEOUS at 05:10

## 2018-05-29 RX ADMIN — HEPARIN SODIUM 5000 UNIT(S): 5000 INJECTION INTRAVENOUS; SUBCUTANEOUS at 13:25

## 2018-05-29 RX ADMIN — POLYETHYLENE GLYCOL 3350 17 GRAM(S): 17 POWDER, FOR SOLUTION ORAL at 13:25

## 2018-05-29 NOTE — PROGRESS NOTE ADULT - SUBJECTIVE AND OBJECTIVE BOX
Subjective: Patient seen and examined. No new events except as noted.     SUBJECTIVE/ROS:  No chest pain, dyspnea, palpitation, or dizziness.       MEDICATIONS:  MEDICATIONS  (STANDING):  docusate sodium 100 milliGRAM(s) Oral daily  furosemide    Tablet 20 milliGRAM(s) Oral daily  heparin  Injectable 5000 Unit(s) SubCutaneous every 8 hours  senna 2 Tablet(s) Oral at bedtime      PHYSICAL EXAM:  T(C): 36.6 (05-29-18 @ 05:09), Max: 36.7 (05-28-18 @ 23:55)  HR: 63 (05-29-18 @ 05:09) (63 - 86)  BP: 114/71 (05-29-18 @ 05:09) (114/71 - 133/89)  RR: 18 (05-29-18 @ 05:09) (16 - 18)  SpO2: 97% (05-29-18 @ 05:09) (95% - 97%)  Wt(kg): --  I&O's Summary    28 May 2018 07:01  -  29 May 2018 07:00  --------------------------------------------------------  IN: 500 mL / OUT: 400 mL / NET: 100 mL        JVP: Normal  Neck: supple  Lung: clear   CV: S1 S2 , Murmur:   Abd: soft  Ext: No edema  neuro: Awake / alert  Psych: flat affect  Skin: normal       LABS/DATA:    CARDIAC MARKERS:                                13.2   6.55  )-----------( 156      ( 29 May 2018 06:18 )             41.5     05-29    140  |  102  |  36<H>  ----------------------------<  83  3.9   |  26  |  1.72<H>    Ca    8.3<L>      29 May 2018 06:19  Phos  3.3     05-29  Mg     2.5     05-29      proBNP:   Lipid Profile:   HgA1c:   TSH:     TELE:  EKG:

## 2018-05-29 NOTE — PROGRESS NOTE ADULT - ASSESSMENT
severe AS  severe  LV systolic dysfunction     off BB due to bradycardia  on lasix  monitor crt   had NSVT on tele , lifevest is recommended, pt is refusing   discussed with pt and family , they would like to be transferred to [facility] for TAVR and work up   follow up with DR PADILLA for [facility] transfer

## 2018-05-29 NOTE — PROGRESS NOTE ADULT - SUBJECTIVE AND OBJECTIVE BOX
Patient is a 86y old  Male who presents with a chief complaint of SOB/GARCIA (26 May 2018 10:38)      SUBJECTIVE / OVERNIGHT EVENTS: Feels well. Denies any complaints. Resting comfortably in bed.     MEDICATIONS  (STANDING):  docusate sodium 100 milliGRAM(s) Oral daily  furosemide    Tablet 20 milliGRAM(s) Oral daily  heparin  Injectable 5000 Unit(s) SubCutaneous every 8 hours  senna 2 Tablet(s) Oral at bedtime    Vital Signs Last 24 Hrs  T(C): 36.6 (29 May 2018 05:09), Max: 36.7 (28 May 2018 23:55)  T(F): 97.8 (29 May 2018 05:09), Max: 98 (28 May 2018 23:55)  HR: 63 (29 May 2018 05:09) (63 - 86)  BP: 114/71 (29 May 2018 05:09) (114/71 - 133/89)  BP(mean): --  RR: 18 (29 May 2018 05:09) (18 - 18)  SpO2: 97% (29 May 2018 05:09) (96% - 97%)      PHYSICAL EXAM:  GENERAL: NAD, well-developed, mute, communicates via sign language, able to get up from bed and ambulate to the bathroom without any motor or respiratory difficulties   HEAD:  Atraumatic, Normocephalic  EYES: EOMI, PERRLA, conjunctiva and sclera clear  NECK: Supple, No JVD  CHEST/LUNG: decreased bibasilar breath sounds  HEART: Regular rate and rhythm; 4/6 systolic murmur loudest in 2nd R ICS with radiation to carotids, No rubs, or gallops  ABDOMEN: Soft, Nontender, Nondistended; Bowel sounds present  EXTREMITIES:  2+ Peripheral Pulses, No clubbing, cyanosis, or edema  PSYCH: AAOx3  NEUROLOGY: non-focal  SKIN: No rashes or lesions  LABS:                        13.2   6.55  )-----------( 156      ( 29 May 2018 06:18 )             41.5     05-29    140  |  102  |  36<H>  ----------------------------<  83  3.9   |  26  |  1.72<H>    Ca    8.3<L>      29 May 2018 06:19  Phos  3.3     05-29  Mg     2.5     05-29      RADIOLOGY & ADDITIONAL TESTS:    Imaging Personally Reviewed:    Consultant(s) Notes Reviewed:      Care Discussed with Consultants/Other Providers:    Assessment and Plan:

## 2018-05-29 NOTE — PROGRESS NOTE ADULT - PROBLEM SELECTOR PLAN 4
s/p diuretics for pleural effusion. As per pulmonary effusion too small to be drained but responsive to diuretics. Continue to monitor.

## 2018-05-29 NOTE — PROGRESS NOTE ADULT - PROBLEM SELECTOR PLAN 1
EF 23%, will need ischemic workup via diagnostic catheterization,   cardio f/u appreciated   possible transfer to [facility] for TAVR tomorrow

## 2018-05-30 LAB
BUN SERPL-MCNC: 34 MG/DL — HIGH (ref 7–23)
CALCIUM SERPL-MCNC: 8.4 MG/DL — SIGNIFICANT CHANGE UP (ref 8.4–10.5)
CHLORIDE SERPL-SCNC: 103 MMOL/L — SIGNIFICANT CHANGE UP (ref 98–107)
CO2 SERPL-SCNC: 27 MMOL/L — SIGNIFICANT CHANGE UP (ref 22–31)
CREAT SERPL-MCNC: 1.55 MG/DL — HIGH (ref 0.5–1.3)
GLUCOSE SERPL-MCNC: 80 MG/DL — SIGNIFICANT CHANGE UP (ref 70–99)
HCT VFR BLD CALC: 37.8 % — LOW (ref 39–50)
HGB BLD-MCNC: 12.4 G/DL — LOW (ref 13–17)
MAGNESIUM SERPL-MCNC: 2.5 MG/DL — SIGNIFICANT CHANGE UP (ref 1.6–2.6)
MCHC RBC-ENTMCNC: 31.9 PG — SIGNIFICANT CHANGE UP (ref 27–34)
MCHC RBC-ENTMCNC: 32.8 % — SIGNIFICANT CHANGE UP (ref 32–36)
MCV RBC AUTO: 97.2 FL — SIGNIFICANT CHANGE UP (ref 80–100)
NRBC # FLD: 0 — SIGNIFICANT CHANGE UP
PLATELET # BLD AUTO: 146 K/UL — LOW (ref 150–400)
PMV BLD: 12.9 FL — SIGNIFICANT CHANGE UP (ref 7–13)
POTASSIUM SERPL-MCNC: 4.1 MMOL/L — SIGNIFICANT CHANGE UP (ref 3.5–5.3)
POTASSIUM SERPL-SCNC: 4.1 MMOL/L — SIGNIFICANT CHANGE UP (ref 3.5–5.3)
RBC # BLD: 3.89 M/UL — LOW (ref 4.2–5.8)
RBC # FLD: 13.1 % — SIGNIFICANT CHANGE UP (ref 10.3–14.5)
SODIUM SERPL-SCNC: 141 MMOL/L — SIGNIFICANT CHANGE UP (ref 135–145)
WBC # BLD: 5.27 K/UL — SIGNIFICANT CHANGE UP (ref 3.8–10.5)
WBC # FLD AUTO: 5.27 K/UL — SIGNIFICANT CHANGE UP (ref 3.8–10.5)

## 2018-05-30 PROCEDURE — 99233 SBSQ HOSP IP/OBS HIGH 50: CPT

## 2018-05-30 RX ADMIN — Medication 100 MILLIGRAM(S): at 12:03

## 2018-05-30 RX ADMIN — HEPARIN SODIUM 5000 UNIT(S): 5000 INJECTION INTRAVENOUS; SUBCUTANEOUS at 12:03

## 2018-05-30 RX ADMIN — SENNA PLUS 2 TABLET(S): 8.6 TABLET ORAL at 21:10

## 2018-05-30 RX ADMIN — HEPARIN SODIUM 5000 UNIT(S): 5000 INJECTION INTRAVENOUS; SUBCUTANEOUS at 05:03

## 2018-05-30 RX ADMIN — HEPARIN SODIUM 5000 UNIT(S): 5000 INJECTION INTRAVENOUS; SUBCUTANEOUS at 21:10

## 2018-05-30 RX ADMIN — Medication 20 MILLIGRAM(S): at 05:04

## 2018-05-30 NOTE — PROGRESS NOTE ADULT - PROBLEM SELECTOR PLAN 1
EF 23%, will need ischemic workup via diagnostic catheterization,   cardio f/u appreciated   awaiting transfer to [facility] for TAVR today

## 2018-05-30 NOTE — PROGRESS NOTE ADULT - SUBJECTIVE AND OBJECTIVE BOX
Subjective: Patient seen and examined. No new events except as noted.     SUBJECTIVE/ROS:  feels ok        MEDICATIONS:  MEDICATIONS  (STANDING):  docusate sodium 100 milliGRAM(s) Oral daily  furosemide    Tablet 20 milliGRAM(s) Oral daily  heparin  Injectable 5000 Unit(s) SubCutaneous every 8 hours  senna 2 Tablet(s) Oral at bedtime      PHYSICAL EXAM:  T(C): 36.7 (05-30-18 @ 05:02), Max: 36.7 (05-30-18 @ 05:02)  HR: 63 (05-30-18 @ 05:02) (63 - 66)  BP: 111/57 (05-30-18 @ 05:02) (105/59 - 111/57)  RR: 18 (05-30-18 @ 05:02) (18 - 18)  SpO2: 97% (05-30-18 @ 05:02) (97% - 98%)  Wt(kg): --  I&O's Summary    29 May 2018 07:01  -  30 May 2018 07:00  --------------------------------------------------------  IN: 200 mL / OUT: 500 mL / NET: -300 mL        JVP: Normal  Neck: supple  Lung: clear   CV: S1 S2 , Murmur:  Abd: soft  Ext: No edema  neuro: Awake / alert  Psych: flat affect  Skin: normal       LABS/DATA:    CARDIAC MARKERS:                                12.4   5.27  )-----------( 146      ( 30 May 2018 05:45 )             37.8     05-30    141  |  103  |  34<H>  ----------------------------<  80  4.1   |  27  |  1.55<H>    Ca    8.4      30 May 2018 05:45  Phos  3.3     05-29  Mg     2.5     05-30      proBNP:   Lipid Profile:   HgA1c:   TSH:     TELE:  EKG:

## 2018-05-30 NOTE — PROGRESS NOTE ADULT - SUBJECTIVE AND OBJECTIVE BOX
Patient is a 86y old  Male who presents with a chief complaint of SOB/GARCIA (26 May 2018 10:38)    SUBJECTIVE / OVERNIGHT EVENTS: Feels well. Denies any complaints. Resting comfortably in bed.     MEDICATIONS  (STANDING):  docusate sodium 100 milliGRAM(s) Oral daily  furosemide    Tablet 20 milliGRAM(s) Oral daily  heparin  Injectable 5000 Unit(s) SubCutaneous every 8 hours  senna 2 Tablet(s) Oral at bedtime    Vital Signs Last 24 Hrs  T(C): 36.6 (30 May 2018 09:26), Max: 36.7 (30 May 2018 05:02)  T(F): 97.8 (30 May 2018 09:26), Max: 98 (30 May 2018 05:02)  HR: 71 (30 May 2018 09:26) (63 - 71)  BP: 112/67 (30 May 2018 09:26) (105/59 - 112/67)  BP(mean): --  RR: 18 (30 May 2018 09:26) (18 - 18)  SpO2: 97% (30 May 2018 09:26) (97% - 98%)      PHYSICAL EXAM:  GENERAL: NAD, well-developed, mute, communicates via sign language, able to get up from bed and ambulate to the bathroom without any motor or respiratory difficulties   HEAD:  Atraumatic, Normocephalic  EYES: EOMI, PERRLA, conjunctiva and sclera clear  NECK: Supple, No JVD  CHEST/LUNG: decreased bibasilar breath sounds  HEART: Regular rate and rhythm; 4/6 systolic murmur loudest in 2nd R ICS with radiation to carotids, No rubs, or gallops  ABDOMEN: Soft, Nontender, Nondistended; Bowel sounds present  EXTREMITIES:  2+ Peripheral Pulses, No clubbing, cyanosis, or edema  PSYCH: AAOx3  NEUROLOGY: non-focal  SKIN: No rashes or lesions  LABS:                                   12.4   5.27  )-----------( 146      ( 30 May 2018 05:45 )             37.8   05-30    141  |  103  |  34<H>  ----------------------------<  80  4.1   |  27  |  1.55<H>    Ca    8.4      30 May 2018 05:45  Phos  3.3     05-29  Mg     2.5     05-30        RADIOLOGY & ADDITIONAL TESTS:    Imaging Personally Reviewed:    Consultant(s) Notes Reviewed:      Care Discussed with Consultants/Other Providers:    Assessment and Plan:

## 2018-05-30 NOTE — PROGRESS NOTE ADULT - ASSESSMENT
severe AS  severe  LV systolic dysfunction     off BB due to bradycardia  on lasix  monitor crt   had NSVT on tele , lifevest is recommended, pt is refusing   discussed with pt and family , they would like to be transferred to [facility] for TAVR and work up   awaiting [facility] transfer

## 2018-05-31 ENCOUNTER — INPATIENT (INPATIENT)
Facility: HOSPITAL | Age: 83
LOS: 5 days | Discharge: HOME | DRG: 287 | End: 2018-06-06
Attending: INTERNAL MEDICINE | Admitting: HOSPITALIST
Payer: MEDICARE

## 2018-05-31 VITALS
HEART RATE: 89 BPM | TEMPERATURE: 98 F | SYSTOLIC BLOOD PRESSURE: 117 MMHG | WEIGHT: 201.5 LBS | OXYGEN SATURATION: 98 % | DIASTOLIC BLOOD PRESSURE: 79 MMHG | RESPIRATION RATE: 16 BRPM | HEIGHT: 66 IN

## 2018-05-31 VITALS — HEART RATE: 75 BPM | SYSTOLIC BLOOD PRESSURE: 112 MMHG | DIASTOLIC BLOOD PRESSURE: 68 MMHG

## 2018-05-31 DIAGNOSIS — I35.9 NONRHEUMATIC AORTIC VALVE DISORDER, UNSPECIFIED: ICD-10-CM

## 2018-05-31 DIAGNOSIS — R63.8 OTHER SYMPTOMS AND SIGNS CONCERNING FOOD AND FLUID INTAKE: ICD-10-CM

## 2018-05-31 DIAGNOSIS — Z29.9 ENCOUNTER FOR PROPHYLACTIC MEASURES, UNSPECIFIED: ICD-10-CM

## 2018-05-31 DIAGNOSIS — I50.21 ACUTE SYSTOLIC (CONGESTIVE) HEART FAILURE: ICD-10-CM

## 2018-05-31 DIAGNOSIS — N18.3 CHRONIC KIDNEY DISEASE, STAGE 3 (MODERATE): ICD-10-CM

## 2018-05-31 DIAGNOSIS — I35.0 NONRHEUMATIC AORTIC (VALVE) STENOSIS: ICD-10-CM

## 2018-05-31 LAB
BUN SERPL-MCNC: 36 MG/DL — HIGH (ref 7–23)
CALCIUM SERPL-MCNC: 8.8 MG/DL — SIGNIFICANT CHANGE UP (ref 8.4–10.5)
CHLORIDE SERPL-SCNC: 100 MMOL/L — SIGNIFICANT CHANGE UP (ref 98–107)
CO2 SERPL-SCNC: 26 MMOL/L — SIGNIFICANT CHANGE UP (ref 22–31)
CREAT SERPL-MCNC: 1.62 MG/DL — HIGH (ref 0.5–1.3)
GLUCOSE SERPL-MCNC: 86 MG/DL — SIGNIFICANT CHANGE UP (ref 70–99)
HCT VFR BLD CALC: 41 % — SIGNIFICANT CHANGE UP (ref 39–50)
HGB BLD-MCNC: 13.4 G/DL — SIGNIFICANT CHANGE UP (ref 13–17)
MAGNESIUM SERPL-MCNC: 2.6 MG/DL — SIGNIFICANT CHANGE UP (ref 1.6–2.6)
MCHC RBC-ENTMCNC: 31.8 PG — SIGNIFICANT CHANGE UP (ref 27–34)
MCHC RBC-ENTMCNC: 32.7 % — SIGNIFICANT CHANGE UP (ref 32–36)
MCV RBC AUTO: 97.4 FL — SIGNIFICANT CHANGE UP (ref 80–100)
NRBC # FLD: 0 — SIGNIFICANT CHANGE UP
PLATELET # BLD AUTO: 162 K/UL — SIGNIFICANT CHANGE UP (ref 150–400)
PMV BLD: 12.8 FL — SIGNIFICANT CHANGE UP (ref 7–13)
POTASSIUM SERPL-MCNC: 4.1 MMOL/L — SIGNIFICANT CHANGE UP (ref 3.5–5.3)
POTASSIUM SERPL-SCNC: 4.1 MMOL/L — SIGNIFICANT CHANGE UP (ref 3.5–5.3)
RBC # BLD: 4.21 M/UL — SIGNIFICANT CHANGE UP (ref 4.2–5.8)
RBC # FLD: 13.1 % — SIGNIFICANT CHANGE UP (ref 10.3–14.5)
SODIUM SERPL-SCNC: 137 MMOL/L — SIGNIFICANT CHANGE UP (ref 135–145)
WBC # BLD: 5.58 K/UL — SIGNIFICANT CHANGE UP (ref 3.8–10.5)
WBC # FLD AUTO: 5.58 K/UL — SIGNIFICANT CHANGE UP (ref 3.8–10.5)

## 2018-05-31 PROCEDURE — 99223 1ST HOSP IP/OBS HIGH 75: CPT

## 2018-05-31 PROCEDURE — 99223 1ST HOSP IP/OBS HIGH 75: CPT | Mod: GC

## 2018-05-31 PROCEDURE — 99239 HOSP IP/OBS DSCHRG MGMT >30: CPT

## 2018-05-31 RX ORDER — HEPARIN SODIUM 5000 [USP'U]/ML
5000 INJECTION INTRAVENOUS; SUBCUTANEOUS EVERY 8 HOURS
Qty: 0 | Refills: 0 | Status: DISCONTINUED | OUTPATIENT
Start: 2018-05-31 | End: 2018-06-06

## 2018-05-31 RX ORDER — FUROSEMIDE 40 MG
20 TABLET ORAL DAILY
Qty: 0 | Refills: 0 | Status: DISCONTINUED | OUTPATIENT
Start: 2018-05-31 | End: 2018-06-01

## 2018-05-31 RX ORDER — DOCUSATE SODIUM 100 MG
100 CAPSULE ORAL DAILY
Qty: 0 | Refills: 0 | Status: DISCONTINUED | OUTPATIENT
Start: 2018-05-31 | End: 2018-06-06

## 2018-05-31 RX ORDER — SENNA PLUS 8.6 MG/1
2 TABLET ORAL AT BEDTIME
Qty: 0 | Refills: 0 | Status: DISCONTINUED | OUTPATIENT
Start: 2018-05-31 | End: 2018-06-06

## 2018-05-31 RX ADMIN — HEPARIN SODIUM 5000 UNIT(S): 5000 INJECTION INTRAVENOUS; SUBCUTANEOUS at 22:16

## 2018-05-31 RX ADMIN — HEPARIN SODIUM 5000 UNIT(S): 5000 INJECTION INTRAVENOUS; SUBCUTANEOUS at 05:16

## 2018-05-31 RX ADMIN — SENNA PLUS 2 TABLET(S): 8.6 TABLET ORAL at 22:16

## 2018-05-31 RX ADMIN — Medication 100 MILLIGRAM(S): at 13:07

## 2018-05-31 RX ADMIN — Medication 20 MILLIGRAM(S): at 05:16

## 2018-05-31 RX ADMIN — HEPARIN SODIUM 5000 UNIT(S): 5000 INJECTION INTRAVENOUS; SUBCUTANEOUS at 13:07

## 2018-05-31 NOTE — H&P ADULT - NSHPLABSRESULTS_GEN_ALL_CORE
.  Labs reviewed personally.                          13.4   5.58  )-----------( 162      ( 31 May 2018 07:30 )             41.0     Hgb Trend: 13.4<--, 12.4<--, 13.2<--, 13.0<--, 12.7<--  05-31    137  |  100  |  36<H>  ----------------------------<  86  4.1   |  26  |  1.62<H>    Ca    8.8      31 May 2018 07:30  Mg     2.6     05-31      Creatinine Trend: 1.62<--, 1.55<--, 1.72<--, 1.50<--, 1.57<--, 1.57<--        Imaging reviewed personally.  CT chest moderate pleural effusion.

## 2018-05-31 NOTE — PROGRESS NOTE ADULT - PROVIDER SPECIALTY LIST ADULT
Cardiology
Hospitalist
Internal Medicine
Internal Medicine
Pulmonology
Pulmonology
Hospitalist

## 2018-05-31 NOTE — PROGRESS NOTE ADULT - SUBJECTIVE AND OBJECTIVE BOX
Subjective: Patient seen and examined. No new events except as noted.     SUBJECTIVE/ROS:  No chest pain, dyspnea, palpitation, or dizziness.       MEDICATIONS:  MEDICATIONS  (STANDING):  docusate sodium 100 milliGRAM(s) Oral daily  furosemide    Tablet 20 milliGRAM(s) Oral daily  heparin  Injectable 5000 Unit(s) SubCutaneous every 8 hours  senna 2 Tablet(s) Oral at bedtime      PHYSICAL EXAM:  T(C): 36.4 (05-31-18 @ 05:15), Max: 36.8 (05-30-18 @ 14:13)  HR: 61 (05-31-18 @ 05:15) (61 - 80)  BP: 106/56 (05-31-18 @ 05:15) (106/56 - 125/81)  RR: 18 (05-31-18 @ 05:15) (16 - 18)  SpO2: 99% (05-31-18 @ 05:15) (97% - 100%)  Wt(kg): --  I&O's Summary    30 May 2018 07:01  -  31 May 2018 07:00  --------------------------------------------------------  IN: 150 mL / OUT: 350 mL / NET: -200 mL        JVP: Normal  Neck: supple  Lung: clear   CV: S1 S2 , Murmur:  Abd: soft  Ext: No edema  neuro: Awake / alert  Psych: flat affect  Skin: normal     LABS/DATA:    CARDIAC MARKERS:                                13.4   5.58  )-----------( 162      ( 31 May 2018 07:30 )             41.0     05-30    141  |  103  |  34<H>  ----------------------------<  80  4.1   |  27  |  1.55<H>    Ca    8.4      30 May 2018 05:45  Mg     2.5     05-30      proBNP:   Lipid Profile:   HgA1c:   TSH:     TELE:  EKG:

## 2018-05-31 NOTE — CONSULT NOTE ADULT - ATTENDING COMMENTS
The Structural Heart team was asked to evaluate Mr Christopher by Dr Carreon in the setting of severe AS and severely reduced LV funtion.  He denies any known cardiac history and his admission was prompted by a 2 minute episode of dizziness.  He reports no prior CAD, MI, CVA, DM, HTN, or HLD.  He states he was taking no medications prior to admission.  He lives independently and is fully function.  He is deaf and mute and our interview was conducted via a  at the bedside.  He denies angina, dyspnea, fatigue, or syncope.  He has CKD.  TTE images were reviewed in detail and meet all hemodynamic criteria for severe AS with an LVEF of 25%.  He will be seen by Nephrology in preparation for contrast-requiring procedures; tentatively, angiogram tomorrow and cardiac CTA on Monday.  I will defer to Dr Carreon regarding EPS evaluation; our understanding is that a LifeVest was recommended at [facility] prior to being transferred but the patient deferred such intervention.  All of his questions were answered in detail.  He was consented for the catheterization.  Endy Perez MD

## 2018-05-31 NOTE — CHART NOTE - NSCHARTNOTEFT_GEN_A_CORE
R2 MEDICINE UPDATE NOTE    Patient signed out to ADS JUDD Ramsey at [id number].    Yaa Morales MD  PGY-2 | Internal Medicine  [phone] / [phone]

## 2018-05-31 NOTE — PROGRESS NOTE ADULT - PROBLEM SELECTOR PLAN 6
- PT consult, fall precautions  - Dash diet  - Improve Score of 0.6%, HSQ in setting of CASH
-Renally dose all meds  -avoid nephrotoxic agents
- PT consult, fall precautions  - Dash diet  - Improve Score of 0.6%, HSQ in setting of CASH
-Renally dose all meds  -avoid nephrotoxic agents
-Renally dose all meds  -avoid nephrotoxic agents
- PT consult, fall precautions  - Dash diet  - Improve Score of 0.6%, HSQ in setting of CASH

## 2018-05-31 NOTE — H&P ADULT - ASSESSMENT
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

## 2018-05-31 NOTE — H&P ADULT - NSHPSOCIALHISTORY_GEN_ALL_CORE
former smoker (5py, quit 1986), drinks daily (1-2 glasses of wine), denies illicit drug use  ambulates with a cane, lives with wife and children

## 2018-05-31 NOTE — H&P ADULT - NSHPREVIEWOFSYSTEMS_GEN_ALL_CORE
Gen: denies fever, chills  HENT: denies throat pain, nasal congestion  Eye: denies changes in vision, eye pain  CV: denies chest pain, palpitations  Pulm: denies SOB, cough  Abd: denies abd pain, N/V/D/C  : denies hematuria, dysuria  Skin: denies rash, itching  Ext: denies LE edema, pain  Neuro: denies HA, dizziness, lightheadedness

## 2018-05-31 NOTE — CONSULT NOTE ADULT - PROBLEM SELECTOR RECOMMENDATION 9
Cardiac Cath  Cardiac CT  c/w TAVR evaluation by structural heart team   care as per primary team
TAVR evaluation/Ischemic eval in progress  Cardiac cath scheduled for tomorrow pending renal clearance, consent signed placed in chart  Cardiac CT likely Monday pending renal status following cath  Carotid dopplers and PFTs ordered  Timing of TAVR TBD pending completion of evaluation, if deemed a candidate, will be scheduled as outpt.  Pt. appears euvolemic at this time and is asymptomatic.

## 2018-05-31 NOTE — H&P ADULT - PROBLEM SELECTOR PLAN 2
- Pt does not appear to be in acute CHF exacerbation at this time.  - Continue diuresis - furosemide 20mg q day.  - Strict I/Os.  - Daily weights.  - Beta blocker discontinued 2/2 bradycardia.

## 2018-05-31 NOTE — CONSULT NOTE ADULT - SUBJECTIVE AND OBJECTIVE BOX
History of Present Illness:   at bedside for interview.      This is a 85 y/o male with PMH GIB in September 2016 w/ cratered gastric ulcer on EGD in setting of ETOH use (no further episodes), Deaf/Mute, severe AS and CKD transferred from [facility] for TAVR evaluation. Pt. initially presented to urgent care with complaints of dizziness, found to have a left pleural effusion, and was sent to [facility] ED. He was diuresed in the setting of acute CHF exacerbation, TTE revealed severe AS with depressed EF of 23%. He has poor medical follow up stating he hasn't seen his PCP in "years" and was on no medications prior to admission, he denies any additional PMH. Denies chest pain, palpitations, SOB, GARCIA, PND, Orthopnea, LE edema, or syncopal episodes. He lives alone in a multifamily house with his son and daughter in law, is alert and oriented x3, and independent in all ADLs.     CT Surgery consulted for TAVR  Past Medical History  Aortic stenosis  Mute  Deaf    Past Surgical History  No significant past surgical history      Vital Signs Last 24 Hrs  T(C): 36.8 (05-31-18 @ 16:57), Max: 36.8 (05-31-18 @ 16:57)  T(F): 98.2 (05-31-18 @ 16:57), Max: 98.2 (05-31-18 @ 16:57)  HR: 89 (05-31-18 @ 16:57) (61 - 89)  BP: 117/79 (05-31-18 @ 16:57) (106/56 - 125/81)  RR: 16 (05-31-18 @ 16:57) (16 - 18)  SpO2: 98% (05-31-18 @ 16:57) (95% - 99%)           Daily Height in cm: 167.64 (31 May 2018 16:57)    Daily   Admit Wt: Drug Dosing Weight  Height (cm): 167.64 (31 May 2018 16:57)  Weight (kg): 91.4 (31 May 2018 16:57)  BMI (kg/m2): 32.5 (31 May 2018 16:57)  BSA (m2): 2.01 (31 May 2018 16:57)    Allergies: No Known Allergies      SOCIAL HISTORY:   former Smoker: [ ] Yes  [ ] No        PACK YEARS:  5                       WHEN QUIT?  alcohol use: [ x] Yes  [ ] No              FREQUENCY / QUANTITY:  Ilicit Drug use:  [ ] Yes  [ x] No    FAMILY HISTORY:  Family history of cancer (Sibling)  Review of Systems  GENERAL:  no weakness, fatigue, fevers or chills  NEURO: no dizziness, numbness, tingling or weakness  SKIN: no itching, burning, rashes, or lesions   HEENT: no visual changes;  no headache, no vertigo, no recent colds  RESPIRATORY: reports  GARCIA , no cough, sputum, wheezing, hemoptysis;   CARDIOVASCULAR:  no chest pain,  or palpitations + dizziness  GI: no abd pain. no N/V/D.  PERIPHERAL VASCULAR: no swelling, no tenderness, no erythema, no varicose veins.     PHYSICAL EXAM- seen and examined with    General:  Deaf Well nourished, well developed, NAD .                                              Neuro: Normal exam oriented to person/place & time                     Eyes: Normal exam of conjunctiva & lids, pupils equally reactive.   ENT: Normal exam of nasal/oral mucosa with absence of cyanosis.   Neck: Normal exam of jugular veins, trachea & thyroid.   Chest: Normal lung exam with good air movement absence of wheezes, rales, or rhonchi:                                                                          CV:  Auscultation: normal S1S2, 2/6   Murmurs   Carotids: No Bruits[x ]  Abdominal Aorta: normal [x ] nonpalpable[ ]                                                                         GI: Normal exam of abdomen with no noted masses or tenderness. +[id number]                                                                                            Extremities: Normal no evidence of cyanosis or deformity, Edema:   Lower Extremity Pulses: Right[ ] Left[ ]Varicosities[ ]  SKIN : Normal exam to inspection & palpation.                                                           LABS:                        13.4   5.58  )-----------( 162      ( 31 May 2018 07:30 )             41.0     05-31    137  |  100  |  36<H>  ----------------------------<  86  4.1   |  26  |  1.62<H>    Ca    8.8      31 May 2018 07:30  Mg     2.6     05-31    Cardiac Cath: pending    TTE / DARIAN:< from: Transthoracic Echocardiogram (05.23.18 @ 10:37) >  . Calcified trileaflet aortic valve with decreased  opening. Peak transaortic valve gradient equals 106 mm Hg,  mean transaortic valve gradient equals 53 mm Hg, estimated  aortic valve area equals 0.6 sqcm (by continuity equation),  consistent with severe aortic stenosis. Mild aortic  regurgitation.  2. Severe global left ventricular systolic dysfunction.  3. Right ventricular enlargement with decreased right  ventricular systolic function.

## 2018-05-31 NOTE — CONSULT NOTE ADULT - SUBJECTIVE AND OBJECTIVE BOX
HPI:     at bedside for interview.     87 y/o male with PMH GIB in September 2016 w/ cratered gastric ulcer on EGD in setting of ETOH use (no further episodes), Deaf/Mute, and new diagnosis of severe AS and CKD transferred from [facility] for TAVR evaluation. Pt. initially presented to urgent care with complaints of dizziness, found to have a left pleural effusion, and was sent to [facility] ED. He was diuresed in the setting of acute CHF exacerbation, TTE revealed severe AS with depressed EF of 23%. He has poor medical follow up stating he hasn't seen his PCP in "years" and was on no medications prior to admission, he denies any additional PMH. Denies chest pain, palpitations, SOB, GARCIA, PND, Orthopnea, LE edema, or syncopal episodes. He lives alone in a multifamily house with his son and daughter in law, is alert and oriented x3, and independent in all ADLs.         PAST MEDICAL & SURGICAL HISTORY:  Aortic stenosis  Mute  Deaf  No significant past surgical history      REVIEW OF SYSTEMS:    CONSTITUTIONAL: No weakness, fevers or chills, (+) fagitue  EYES/ENT: No visual changes;  No vertigo or throat pain   NECK: No pain or stiffness  RESPIRATORY: No cough, wheezing, hemoptysis; No shortness of breath  CARDIOVASCULAR: No chest pain or palpitations, PND, orthopnea, or LE edema, (+) intermittent dizziness  GASTROINTESTINAL: No abdominal or epigastric pain. No nausea, vomiting, or hematemesis; No diarrhea or constipation. No melena or hematochezia.  GENITOURINARY: No dysuria, frequency or hematuria  NEUROLOGICAL: No numbness or weakness  SKIN: No itching, rashes      MEDICATIONS  (STANDING):    MEDICATIONS  (PRN):      Allergies    No Known Allergies    Intolerances        SOCIAL HISTORY: see HPI    FAMILY HISTORY:  Family history of cancer (Sibling)      Vital Signs Last 24 Hrs  T(C): 36.8 (31 May 2018 16:57), Max: 36.8 (31 May 2018 16:57)  T(F): 98.2 (31 May 2018 16:57), Max: 98.2 (31 May 2018 16:57)  HR: 89 (31 May 2018 16:57) (61 - 89)  BP: 117/79 (31 May 2018 16:57) (106/56 - 125/81)  BP(mean): --  RR: 16 (31 May 2018 16:57) (16 - 18)  SpO2: 98% (31 May 2018 16:57) (95% - 99%)    Physical Exam  General: A/ox 3, No acute Distress  Neck: Supple, NO JVD  Cardiac: S1 S2, No M/R/G  Pulmonary: CTAB, Breathing unlabored, No Rhonchi/Rales/Wheezing  Abdomen: Soft, Non -tender, +BS x 4 quads  Extremities: No Rashes, No edema  Neuro: A/o x 3, No focal deficits    LABS:                        13.4   5.58  )-----------( 162      ( 31 May 2018 07:30 )             41.0     05-31    137  |  100  |  36<H>  ----------------------------<  86  4.1   |  26  |  1.62<H>    Ca    8.8      31 May 2018 07:30  Mg     2.6     05-31            RADIOLOGY & ADDITIONAL STUDIES:  < from: Transthoracic Echocardiogram (05.23.18 @ 10:37) >  PROCEDURE: Transthoracic echocardiogram with 2-D, M-Mode  and complete spectral and color flow Doppler.  INDICATION: Heart failure, unspecified (I50.9)  ------------------------------------------------------------------------  DIMENSIONS:  Dimensions:     Normal Values:  LA:     5.1 cm    2.0 - 4.0 cm  Ao:     2.9 cm    2.0 - 3.8 cm  SEPTUM: 0.9 cm    0.6 - 1.2 cm  PWT:    0.9 cm    0.6 - 1.1 cm  LVIDd:  5.5 cm    3.0 - 5.6 cm  LVIDs:  4.9 cm    1.8 - 4.0 cm  Derived Variables:  LVMI: 92 g/m2  RWT: 0.32  Fractional short: 11 %  Ejection Fraction (Gilberticholtz): 23 %  ------------------------------------------------------------------------  OBSERVATIONS:  Mitral Valve: Mitral annular calcification, tethered mitral  valve. Mild mitral regurgitation.  Aortic Root: Normal aortic root.  Aortic Valve: Calcified trileaflet aortic valve with  decreased opening. Peak transaortic valve gradient equals  106 mm Hg, mean transaortic valve gradient equals 53 mm Hg,  estimated aortic valve area equals 0.6 sqcm (by continuity  equation), consistent with severe aortic stenosis. Mild  aortic regurgitation.  LVOT velocity time integralequals  17 cm.  Left Atrium: Normal left atrium.  LA volume index = 33  cc/m2.  Left Ventricle: Severe global left ventricular systolic  dysfunction.  Right Heart: Normal right atrium. Right ventricular  enlargement with decreased right ventricular systolic  function. Normal tricuspid valve. Minimal tricuspid  regurgitation. Normal pulmonic valve. Minimal pulmonic  regurgitation.  Pericardium/PleuraNormal pericardium with no pericardial  effusion.  ------------------------------------------------------------------------    < end of copied text > HPI:     at bedside for interview.     87 y/o male with PMH GIB in September 2016 w/ cratered gastric ulcer on EGD in setting of ETOH use (no further episodes), Deaf/Mute, and new diagnosis of severe AS and CKD transferred from [facility] for TAVR evaluation. Pt. initially presented to urgent care with complaints of dizziness, found to have a left pleural effusion, and was sent to [facility] ED. He was diuresed in the setting of acute CHF exacerbation, TTE revealed severe AS with depressed EF of 23%. He has poor medical follow up stating he hasn't seen his PCP in "years" and was on no medications prior to admission, he denies any additional PMH. Denies chest pain, palpitations, SOB, GARCIA, PND, Orthopnea, LE edema, or syncopal episodes. He lives alone in a multifamily house with his son and daughter in law, is alert and oriented x3, and independent in all ADLs.       [place] records reviewed to 2015, Pt. was diagnosed with severe AS with nml EF at that time, refused inpt surgical eval at [facility], and was referred to outpt cardiology clinic for which no appointments appear in [employer]. TTE in 2016 again revealed severe AS with decline in LV function, pt. was referred to outpt cardiology, unclear if he followed up, pt. states "I can't remember".        PAST MEDICAL & SURGICAL HISTORY:  Aortic stenosis  Mute  Deaf  No significant past surgical history      REVIEW OF SYSTEMS:    CONSTITUTIONAL: No weakness, fevers or chills, (+) fagitue  EYES/ENT: No visual changes;  No vertigo or throat pain   NECK: No pain or stiffness  RESPIRATORY: No cough, wheezing, hemoptysis; No shortness of breath  CARDIOVASCULAR: No chest pain or palpitations, PND, orthopnea, or LE edema, (+) intermittent dizziness  GASTROINTESTINAL: No abdominal or epigastric pain. No nausea, vomiting, or hematemesis; No diarrhea or constipation. No melena or hematochezia.  GENITOURINARY: No dysuria, frequency or hematuria  NEUROLOGICAL: No numbness or weakness  SKIN: No itching, rashes      MEDICATIONS  (STANDING):    MEDICATIONS  (PRN):      Allergies    No Known Allergies    Intolerances        SOCIAL HISTORY: see HPI    FAMILY HISTORY:  Family history of cancer (Sibling)      Vital Signs Last 24 Hrs  T(C): 36.8 (31 May 2018 16:57), Max: 36.8 (31 May 2018 16:57)  T(F): 98.2 (31 May 2018 16:57), Max: 98.2 (31 May 2018 16:57)  HR: 89 (31 May 2018 16:57) (61 - 89)  BP: 117/79 (31 May 2018 16:57) (106/56 - 125/81)  BP(mean): --  RR: 16 (31 May 2018 16:57) (16 - 18)  SpO2: 98% (31 May 2018 16:57) (95% - 99%)    Physical Exam  General: A/ox 3, No acute Distress  Neck: Supple, NO JVD  Cardiac: S1 S2, No M/R/G  Pulmonary: CTAB, Breathing unlabored, No Rhonchi/Rales/Wheezing  Abdomen: Soft, Non -tender, +BS x 4 quads  Extremities: No Rashes, No edema  Neuro: A/o x 3, No focal deficits    LABS:                        13.4   5.58  )-----------( 162      ( 31 May 2018 07:30 )             41.0     05-31    137  |  100  |  36<H>  ----------------------------<  86  4.1   |  26  |  1.62<H>    Ca    8.8      31 May 2018 07:30  Mg     2.6     05-31            RADIOLOGY & ADDITIONAL STUDIES:  < from: Transthoracic Echocardiogram (05.23.18 @ 10:37) >  PROCEDURE: Transthoracic echocardiogram with 2-D, M-Mode  and complete spectral and color flow Doppler.  INDICATION: Heart failure, unspecified (I50.9)  ------------------------------------------------------------------------  DIMENSIONS:  Dimensions:     Normal Values:  LA:     5.1 cm    2.0 - 4.0 cm  Ao:     2.9 cm    2.0 - 3.8 cm  SEPTUM: 0.9 cm    0.6 - 1.2 cm  PWT:    0.9 cm    0.6 - 1.1 cm  LVIDd:  5.5 cm    3.0 - 5.6 cm  LVIDs:  4.9 cm    1.8 - 4.0 cm  Derived Variables:  LVMI: 92 g/m2  RWT: 0.32  Fractional short: 11 %  Ejection Fraction (Teicholtz): 23 %  ------------------------------------------------------------------------  OBSERVATIONS:  Mitral Valve: Mitral annular calcification, tethered mitral  valve. Mild mitral regurgitation.  Aortic Root: Normal aortic root.  Aortic Valve: Calcified trileaflet aortic valve with  decreased opening. Peak transaortic valve gradient equals  106 mm Hg, mean transaortic valve gradient equals 53 mm Hg,  estimated aortic valve area equals 0.6 sqcm (by continuity  equation), consistent with severe aortic stenosis. Mild  aortic regurgitation.  LVOT velocity time integralequals  17 cm.  Left Atrium: Normal left atrium.  LA volume index = 33  cc/m2.  Left Ventricle: Severe global left ventricular systolic  dysfunction.  Right Heart: Normal right atrium. Right ventricular  enlargement with decreased right ventricular systolic  function. Normal tricuspid valve. Minimal tricuspid  regurgitation. Normal pulmonic valve. Minimal pulmonic  regurgitation.  Pericardium/PleuraNormal pericardium with no pericardial  effusion.  ------------------------------------------------------------------------    < end of copied text >        < from: Transthoracic Echocardiogram (09.28.16 @ 08:36) >    < from: Transthoracic Echocardiogram (02.23.15 @ 12:31) >  PROCEDURE: Transthoracic echocardiogram with 2-D, M-Mode  and complete spectral and color flow Doppler.  INDICATION: Chest pain, unspecified (786.50), Murmur  (785.2)  ------------------------------------------------------------------------  DIMENSIONS:  Dimensions:     Normal Values:  LA:     3.6 cm    2.0 - 4.0 cm  Ao:     3.3 cm    2.0 - 3.8 cm  SEPTUM:   ---     0.6 - 1.2 cm  PWT:      ---     0.6 - 1.1 cm  LVIDd:    ---     3.0 - 5.6 cm  LVIDs:    ---     1.8 - 4.0 cm  Peak Velocity (m/sec): AoV=3.5  ------------------------------------------------------------------------  OBSERVATIONS:  Mitral Valve: Normal mitral valve.  Aortic Root: Normal size aortic root. (Ao:3.3 cm).  Aortic Valve: Calcified aortic valve with decreased  opening. Peak transaortic valve gradient equals 49 mm Hg,  mean transaortic valve gradient equals 24 mm Hg, estimated  aortic valve area equals 0.9 sqcm (by continuity equation),  consistent with severe aortic stenosis. Peak left  ventricular outflow tract gradient equals 4 mm Hg.  Left Atrium: Normal left atrium.  Left Ventricle: Endocardium not well visualized; grossly  mild left ventricular dysfunction. Grossly normal left  ventricular internal dimensions and wall thicknesses.  Right Heart: Normal right atrium. The right ventricle is  not well visualized; grossly normal right ventricular  systolic function. Normal tricuspid valve.  Pericardium/PleuraNormal pericardium with no pericardial  effusion.  ------------------------------------------------------------------------    < end of copied text >    PROCEDURE: Transthoracic echocardiogram with 2-D, M-Mode  and complete spectral and color flow Doppler.  INDICATION: Cardiomyopathy, unspecified (I42.9),  Nonrheumatic aortic (valve) stenosis (I35.0)  ------------------------------------------------------------------------  OBSERVATIONS:  Mitral Valve: Mitral annular calcification, otherwise  normal mitral valve. Mild mitral regurgitation.  Aortic Root: Normal aortic root.  Aortic Valve: Aortic valve leaflet morphology not well  visualized.  The valve is heavily calcified. Peak  transaortic valve gradient equals 105 mm Hg, mean  transaorticvalve gradient equals 52 mm Hg, estimated  aortic valve area equals 0.7 sqcm (by continuity equation),  consistent with severe aortic stenosis. Mild aortic  regurgitation.  Left Atrium: Normal left atrium.  LA volume index = 29  cc/m2.  Left Ventricle: Moderate global left ventricular systolic  dysfunction.  Right Heart: Normal right atrium. Normal right ventricular  size and function. Normal tricuspid valve.  Minimal  tricuspid regurgitation. Normal pulmonic valve.  Minimal  pulmonic regurgitation.  Pericardium/PleuraNormal pericardium with no pericardial  effusion.  ------------------------------------------------------------------------  CONCLUSIONS:  1. Mitral annular calcification, otherwise normal mitral  valve. Mild mitral regurgitation.  2. Aortic valve leaflet morphology not well visualized.  The valve is heavily calcified. Peak transaortic valve  gradient equals 105 mm Hg, mean transaortic valve gradient  equals 52 mm Hg, estimated aortic valve area equals 0.7  sqcm (by continuity equation), consistent with severe  aortic stenosis. Mild aortic regurgitation.  3. Moderate global left ventricular systolic dysfunction.  4. Normal right ventricular size and function.  *** Compared with echocardiogram of 2/23/2015, the  transaortic gradients have increased significantly.  In  addition, left ventricular systolic function has  deteriorated.    < end of copied text > HPI:     at bedside for interview.     85 y/o male with PMH GIB in September 2016 w/ cratered gastric ulcer on EGD in setting of ETOH use (no further episodes), Deaf/Mute, and new diagnosis of severe AS and CKD transferred from [facility] for TAVR evaluation. Pt. initially presented to urgent care with complaints of dizziness, found to have a left pleural effusion, and was sent to [facility] ED. He was diuresed in the setting of acute CHF exacerbation, TTE revealed severe AS with depressed EF of 23%. He has poor medical follow up stating he hasn't seen his PCP in "years" and was on no medications prior to admission, he denies any additional PMH. Denies chest pain, palpitations, SOB, GARCIA, PND, Orthopnea, LE edema, or syncopal episodes. He lives alone in a multifamily house with his son and daughter in law, is alert and oriented x3, and independent in all ADLs.       [place] records reviewed to 2015, Pt. was diagnosed with severe AS with nml EF at that time, refused inpt surgical eval at [facility], and was referred to Dr. Yang, however did not show for his scheduled appt. TTE in 2016 again revealed severe AS with decline in LV function, pt. was referred to outpt cardiology, unclear if he followed up, pt. states "I can't remember".        PAST MEDICAL & SURGICAL HISTORY:  Aortic stenosis  Mute  Deaf  No significant past surgical history      REVIEW OF SYSTEMS:    CONSTITUTIONAL: No weakness, fevers or chills, (+) fagitue  EYES/ENT: No visual changes;  No vertigo or throat pain   NECK: No pain or stiffness  RESPIRATORY: No cough, wheezing, hemoptysis; No shortness of breath  CARDIOVASCULAR: No chest pain or palpitations, PND, orthopnea, or LE edema, (+) intermittent dizziness  GASTROINTESTINAL: No abdominal or epigastric pain. No nausea, vomiting, or hematemesis; No diarrhea or constipation. No melena or hematochezia.  GENITOURINARY: No dysuria, frequency or hematuria  NEUROLOGICAL: No numbness or weakness  SKIN: No itching, rashes      MEDICATIONS  (STANDING):    MEDICATIONS  (PRN):      Allergies    No Known Allergies    Intolerances        SOCIAL HISTORY: see HPI    FAMILY HISTORY:  Family history of cancer (Sibling)      Vital Signs Last 24 Hrs  T(C): 36.8 (31 May 2018 16:57), Max: 36.8 (31 May 2018 16:57)  T(F): 98.2 (31 May 2018 16:57), Max: 98.2 (31 May 2018 16:57)  HR: 89 (31 May 2018 16:57) (61 - 89)  BP: 117/79 (31 May 2018 16:57) (106/56 - 125/81)  BP(mean): --  RR: 16 (31 May 2018 16:57) (16 - 18)  SpO2: 98% (31 May 2018 16:57) (95% - 99%)    Physical Exam  General: A/ox 3, No acute Distress  Neck: Supple, NO JVD  Cardiac: S1 S2, No M/R/G  Pulmonary: CTAB, Breathing unlabored, No Rhonchi/Rales/Wheezing  Abdomen: Soft, Non -tender, +BS x 4 quads  Extremities: No Rashes, No edema  Neuro: A/o x 3, No focal deficits    LABS:                        13.4   5.58  )-----------( 162      ( 31 May 2018 07:30 )             41.0     05-31    137  |  100  |  36<H>  ----------------------------<  86  4.1   |  26  |  1.62<H>    Ca    8.8      31 May 2018 07:30  Mg     2.6     05-31            RADIOLOGY & ADDITIONAL STUDIES:  < from: Transthoracic Echocardiogram (05.23.18 @ 10:37) >  PROCEDURE: Transthoracic echocardiogram with 2-D, M-Mode  and complete spectral and color flow Doppler.  INDICATION: Heart failure, unspecified (I50.9)  ------------------------------------------------------------------------  DIMENSIONS:  Dimensions:     Normal Values:  LA:     5.1 cm    2.0 - 4.0 cm  Ao:     2.9 cm    2.0 - 3.8 cm  SEPTUM: 0.9 cm    0.6 - 1.2 cm  PWT:    0.9 cm    0.6 - 1.1 cm  LVIDd:  5.5 cm    3.0 - 5.6 cm  LVIDs:  4.9 cm    1.8 - 4.0 cm  Derived Variables:  LVMI: 92 g/m2  RWT: 0.32  Fractional short: 11 %  Ejection Fraction (Teicholtz): 23 %  ------------------------------------------------------------------------  OBSERVATIONS:  Mitral Valve: Mitral annular calcification, tethered mitral  valve. Mild mitral regurgitation.  Aortic Root: Normal aortic root.  Aortic Valve: Calcified trileaflet aortic valve with  decreased opening. Peak transaortic valve gradient equals  106 mm Hg, mean transaortic valve gradient equals 53 mm Hg,  estimated aortic valve area equals 0.6 sqcm (by continuity  equation), consistent with severe aortic stenosis. Mild  aortic regurgitation.  LVOT velocity time integralequals  17 cm.  Left Atrium: Normal left atrium.  LA volume index = 33  cc/m2.  Left Ventricle: Severe global left ventricular systolic  dysfunction.  Right Heart: Normal right atrium. Right ventricular  enlargement with decreased right ventricular systolic  function. Normal tricuspid valve. Minimal tricuspid  regurgitation. Normal pulmonic valve. Minimal pulmonic  regurgitation.  Pericardium/PleuraNormal pericardium with no pericardial  effusion.  ------------------------------------------------------------------------    < end of copied text >        < from: Transthoracic Echocardiogram (09.28.16 @ 08:36) >    < from: Transthoracic Echocardiogram (02.23.15 @ 12:31) >  PROCEDURE: Transthoracic echocardiogram with 2-D, M-Mode  and complete spectral and color flow Doppler.  INDICATION: Chest pain, unspecified (786.50), Murmur  (785.2)  ------------------------------------------------------------------------  DIMENSIONS:  Dimensions:     Normal Values:  LA:     3.6 cm    2.0 - 4.0 cm  Ao:     3.3 cm    2.0 - 3.8 cm  SEPTUM:   ---     0.6 - 1.2 cm  PWT:      ---     0.6 - 1.1 cm  LVIDd:    ---     3.0 - 5.6 cm  LVIDs:    ---     1.8 - 4.0 cm  Peak Velocity (m/sec): AoV=3.5  ------------------------------------------------------------------------  OBSERVATIONS:  Mitral Valve: Normal mitral valve.  Aortic Root: Normal size aortic root. (Ao:3.3 cm).  Aortic Valve: Calcified aortic valve with decreased  opening. Peak transaortic valve gradient equals 49 mm Hg,  mean transaortic valve gradient equals 24 mm Hg, estimated  aortic valve area equals 0.9 sqcm (by continuity equation),  consistent with severe aortic stenosis. Peak left  ventricular outflow tract gradient equals 4 mm Hg.  Left Atrium: Normal left atrium.  Left Ventricle: Endocardium not well visualized; grossly  mild left ventricular dysfunction. Grossly normal left  ventricular internal dimensions and wall thicknesses.  Right Heart: Normal right atrium. The right ventricle is  not well visualized; grossly normal right ventricular  systolic function. Normal tricuspid valve.  Pericardium/PleuraNormal pericardium with no pericardial  effusion.  ------------------------------------------------------------------------    < end of copied text >    PROCEDURE: Transthoracic echocardiogram with 2-D, M-Mode  and complete spectral and color flow Doppler.  INDICATION: Cardiomyopathy, unspecified (I42.9),  Nonrheumatic aortic (valve) stenosis (I35.0)  ------------------------------------------------------------------------  OBSERVATIONS:  Mitral Valve: Mitral annular calcification, otherwise  normal mitral valve. Mild mitral regurgitation.  Aortic Root: Normal aortic root.  Aortic Valve: Aortic valve leaflet morphology not well  visualized.  The valve is heavily calcified. Peak  transaortic valve gradient equals 105 mm Hg, mean  transaorticvalve gradient equals 52 mm Hg, estimated  aortic valve area equals 0.7 sqcm (by continuity equation),  consistent with severe aortic stenosis. Mild aortic  regurgitation.  Left Atrium: Normal left atrium.  LA volume index = 29  cc/m2.  Left Ventricle: Moderate global left ventricular systolic  dysfunction.  Right Heart: Normal right atrium. Normal right ventricular  size and function. Normal tricuspid valve.  Minimal  tricuspid regurgitation. Normal pulmonic valve.  Minimal  pulmonic regurgitation.  Pericardium/PleuraNormal pericardium with no pericardial  effusion.  ------------------------------------------------------------------------  CONCLUSIONS:  1. Mitral annular calcification, otherwise normal mitral  valve. Mild mitral regurgitation.  2. Aortic valve leaflet morphology not well visualized.  The valve is heavily calcified. Peak transaortic valve  gradient equals 105 mm Hg, mean transaortic valve gradient  equals 52 mm Hg, estimated aortic valve area equals 0.7  sqcm (by continuity equation), consistent with severe  aortic stenosis. Mild aortic regurgitation.  3. Moderate global left ventricular systolic dysfunction.  4. Normal right ventricular size and function.  *** Compared with echocardiogram of 2/23/2015, the  transaortic gradients have increased significantly.  In  addition, left ventricular systolic function has  deteriorated.    < end of copied text > HPI:     at bedside for interview.     87 y/o male with PMH GIB in September 2016 w/ cratered gastric ulcer on EGD in setting of ETOH use (no further episodes), Deaf/Mute, severe AS and CKD transferred from [facility] for TAVR evaluation. Pt. initially presented to urgent care with complaints of dizziness, found to have a left pleural effusion, and was sent to [facility] ED. He was diuresed in the setting of acute CHF exacerbation, TTE revealed severe AS with depressed EF of 23%. He has poor medical follow up stating he hasn't seen his PCP in "years" and was on no medications prior to admission, he denies any additional PMH. Denies chest pain, palpitations, SOB, GARCIA, PND, Orthopnea, LE edema, or syncopal episodes. He lives alone in a multifamily house with his son and daughter in law, is alert and oriented x3, and independent in all ADLs.       [place] records reviewed to 2015, Pt. was diagnosed with severe AS with nml EF at that time, refused inpt surgical eval at [facility], and was referred to Dr. Yang, however did not show for his scheduled appt. TTE in 2016 again revealed severe AS with decline in LV function, pt. was referred to outpt cardiology, unclear if he followed up, pt. states "I can't remember".        PAST MEDICAL & SURGICAL HISTORY:  Aortic stenosis  Mute  Deaf  No significant past surgical history      REVIEW OF SYSTEMS:    CONSTITUTIONAL: No weakness, fevers or chills, (+) fagitue  EYES/ENT: No visual changes;  No vertigo or throat pain   NECK: No pain or stiffness  RESPIRATORY: No cough, wheezing, hemoptysis; No shortness of breath  CARDIOVASCULAR: No chest pain or palpitations, PND, orthopnea, or LE edema, (+) intermittent dizziness  GASTROINTESTINAL: No abdominal or epigastric pain. No nausea, vomiting, or hematemesis; No diarrhea or constipation. No melena or hematochezia.  GENITOURINARY: No dysuria, frequency or hematuria  NEUROLOGICAL: No numbness or weakness  SKIN: No itching, rashes      MEDICATIONS  (STANDING):    MEDICATIONS  (PRN):      Allergies    No Known Allergies    Intolerances        SOCIAL HISTORY: see HPI    FAMILY HISTORY:  Family history of cancer (Sibling)      Vital Signs Last 24 Hrs  T(C): 36.8 (31 May 2018 16:57), Max: 36.8 (31 May 2018 16:57)  T(F): 98.2 (31 May 2018 16:57), Max: 98.2 (31 May 2018 16:57)  HR: 89 (31 May 2018 16:57) (61 - 89)  BP: 117/79 (31 May 2018 16:57) (106/56 - 125/81)  BP(mean): --  RR: 16 (31 May 2018 16:57) (16 - 18)  SpO2: 98% (31 May 2018 16:57) (95% - 99%)    Physical Exam  General: A/ox 3, No acute Distress  Neck: Supple, NO JVD  Cardiac: S1 S2, No M/R/G  Pulmonary: CTAB, Breathing unlabored, No Rhonchi/Rales/Wheezing  Abdomen: Soft, Non -tender, +BS x 4 quads  Extremities: No Rashes, No edema  Neuro: A/o x 3, No focal deficits    LABS:                        13.4   5.58  )-----------( 162      ( 31 May 2018 07:30 )             41.0     05-31    137  |  100  |  36<H>  ----------------------------<  86  4.1   |  26  |  1.62<H>    Ca    8.8      31 May 2018 07:30  Mg     2.6     05-31            RADIOLOGY & ADDITIONAL STUDIES:  < from: Transthoracic Echocardiogram (05.23.18 @ 10:37) >  PROCEDURE: Transthoracic echocardiogram with 2-D, M-Mode  and complete spectral and color flow Doppler.  INDICATION: Heart failure, unspecified (I50.9)  ------------------------------------------------------------------------  DIMENSIONS:  Dimensions:     Normal Values:  LA:     5.1 cm    2.0 - 4.0 cm  Ao:     2.9 cm    2.0 - 3.8 cm  SEPTUM: 0.9 cm    0.6 - 1.2 cm  PWT:    0.9 cm    0.6 - 1.1 cm  LVIDd:  5.5 cm    3.0 - 5.6 cm  LVIDs:  4.9 cm    1.8 - 4.0 cm  Derived Variables:  LVMI: 92 g/m2  RWT: 0.32  Fractional short: 11 %  Ejection Fraction (Teicholtz): 23 %  ------------------------------------------------------------------------  OBSERVATIONS:  Mitral Valve: Mitral annular calcification, tethered mitral  valve. Mild mitral regurgitation.  Aortic Root: Normal aortic root.  Aortic Valve: Calcified trileaflet aortic valve with  decreased opening. Peak transaortic valve gradient equals  106 mm Hg, mean transaortic valve gradient equals 53 mm Hg,  estimated aortic valve area equals 0.6 sqcm (by continuity  equation), consistent with severe aortic stenosis. Mild  aortic regurgitation.  LVOT velocity time integralequals  17 cm.  Left Atrium: Normal left atrium.  LA volume index = 33  cc/m2.  Left Ventricle: Severe global left ventricular systolic  dysfunction.  Right Heart: Normal right atrium. Right ventricular  enlargement with decreased right ventricular systolic  function. Normal tricuspid valve. Minimal tricuspid  regurgitation. Normal pulmonic valve. Minimal pulmonic  regurgitation.  Pericardium/PleuraNormal pericardium with no pericardial  effusion.  ------------------------------------------------------------------------    < end of copied text >        < from: Transthoracic Echocardiogram (09.28.16 @ 08:36) >    < from: Transthoracic Echocardiogram (02.23.15 @ 12:31) >  PROCEDURE: Transthoracic echocardiogram with 2-D, M-Mode  and complete spectral and color flow Doppler.  INDICATION: Chest pain, unspecified (786.50), Murmur  (785.2)  ------------------------------------------------------------------------  DIMENSIONS:  Dimensions:     Normal Values:  LA:     3.6 cm    2.0 - 4.0 cm  Ao:     3.3 cm    2.0 - 3.8 cm  SEPTUM:   ---     0.6 - 1.2 cm  PWT:      ---     0.6 - 1.1 cm  LVIDd:    ---     3.0 - 5.6 cm  LVIDs:    ---     1.8 - 4.0 cm  Peak Velocity (m/sec): AoV=3.5  ------------------------------------------------------------------------  OBSERVATIONS:  Mitral Valve: Normal mitral valve.  Aortic Root: Normal size aortic root. (Ao:3.3 cm).  Aortic Valve: Calcified aortic valve with decreased  opening. Peak transaortic valve gradient equals 49 mm Hg,  mean transaortic valve gradient equals 24 mm Hg, estimated  aortic valve area equals 0.9 sqcm (by continuity equation),  consistent with severe aortic stenosis. Peak left  ventricular outflow tract gradient equals 4 mm Hg.  Left Atrium: Normal left atrium.  Left Ventricle: Endocardium not well visualized; grossly  mild left ventricular dysfunction. Grossly normal left  ventricular internal dimensions and wall thicknesses.  Right Heart: Normal right atrium. The right ventricle is  not well visualized; grossly normal right ventricular  systolic function. Normal tricuspid valve.  Pericardium/PleuraNormal pericardium with no pericardial  effusion.  ------------------------------------------------------------------------    < end of copied text >    PROCEDURE: Transthoracic echocardiogram with 2-D, M-Mode  and complete spectral and color flow Doppler.  INDICATION: Cardiomyopathy, unspecified (I42.9),  Nonrheumatic aortic (valve) stenosis (I35.0)  ------------------------------------------------------------------------  OBSERVATIONS:  Mitral Valve: Mitral annular calcification, otherwise  normal mitral valve. Mild mitral regurgitation.  Aortic Root: Normal aortic root.  Aortic Valve: Aortic valve leaflet morphology not well  visualized.  The valve is heavily calcified. Peak  transaortic valve gradient equals 105 mm Hg, mean  transaorticvalve gradient equals 52 mm Hg, estimated  aortic valve area equals 0.7 sqcm (by continuity equation),  consistent with severe aortic stenosis. Mild aortic  regurgitation.  Left Atrium: Normal left atrium.  LA volume index = 29  cc/m2.  Left Ventricle: Moderate global left ventricular systolic  dysfunction.  Right Heart: Normal right atrium. Normal right ventricular  size and function. Normal tricuspid valve.  Minimal  tricuspid regurgitation. Normal pulmonic valve.  Minimal  pulmonic regurgitation.  Pericardium/PleuraNormal pericardium with no pericardial  effusion.  ------------------------------------------------------------------------  CONCLUSIONS:  1. Mitral annular calcification, otherwise normal mitral  valve. Mild mitral regurgitation.  2. Aortic valve leaflet morphology not well visualized.  The valve is heavily calcified. Peak transaortic valve  gradient equals 105 mm Hg, mean transaortic valve gradient  equals 52 mm Hg, estimated aortic valve area equals 0.7  sqcm (by continuity equation), consistent with severe  aortic stenosis. Mild aortic regurgitation.  3. Moderate global left ventricular systolic dysfunction.  4. Normal right ventricular size and function.  *** Compared with echocardiogram of 2/23/2015, the  transaortic gradients have increased significantly.  In  addition, left ventricular systolic function has  deteriorated.    < end of copied text > HPI:     at bedside for interview.     85 y/o male with PMH GIB in September 2016 w/ cratered gastric ulcer on EGD in setting of ETOH use (no further episodes), Deaf/Mute, severe AS and CKD transferred from [facility] for TAVR evaluation. Pt. initially presented to urgent care with complaints of dizziness, found to have a left pleural effusion, and was sent to [facility] ED. He was diuresed in the setting of acute CHF exacerbation, TTE revealed severe AS with depressed EF of 23%. He has poor medical follow up stating he hasn't seen his PCP in "years" and was on no medications prior to admission, he denies any additional PMH. Denies chest pain, palpitations, SOB, GARCIA, PND, Orthopnea, LE edema, or syncopal episodes. He lives alone in a multifamily house with his son and daughter in law, is alert and oriented x3, and independent in all ADLs.       [facility] records reviewed to 2015, Pt. was diagnosed with severe AS with nml EF at that time, refused inpt surgical eval at [facility], and was referred to Dr. Yang, however did not show for his scheduled appt. TTE in 2016 again revealed severe AS with decline in LV function, pt. was referred to outpt cardiology, unclear if he followed up, pt. states "I can't remember".        PAST MEDICAL & SURGICAL HISTORY:  Aortic stenosis  Mute  Deaf  No significant past surgical history      REVIEW OF SYSTEMS:    CONSTITUTIONAL: No weakness, fevers or chills, (+) fagitue  EYES/ENT: No visual changes;  No vertigo or throat pain   NECK: No pain or stiffness  RESPIRATORY: No cough, wheezing, hemoptysis; No shortness of breath  CARDIOVASCULAR: No chest pain or palpitations, PND, orthopnea, or LE edema, (+) intermittent dizziness  GASTROINTESTINAL: No abdominal or epigastric pain. No nausea, vomiting, or hematemesis; No diarrhea or constipation. No melena or hematochezia.  GENITOURINARY: No dysuria, frequency or hematuria  NEUROLOGICAL: No numbness or weakness  SKIN: No itching, rashes      MEDICATIONS  (STANDING):    MEDICATIONS  (PRN):      Allergies    No Known Allergies    Intolerances        SOCIAL HISTORY: see HPI    FAMILY HISTORY:  Family history of cancer (Sibling)      Vital Signs Last 24 Hrs  T(C): 36.8 (31 May 2018 16:57), Max: 36.8 (31 May 2018 16:57)  T(F): 98.2 (31 May 2018 16:57), Max: 98.2 (31 May 2018 16:57)  HR: 89 (31 May 2018 16:57) (61 - 89)  BP: 117/79 (31 May 2018 16:57) (106/56 - 125/81)  BP(mean): --  RR: 16 (31 May 2018 16:57) (16 - 18)  SpO2: 98% (31 May 2018 16:57) (95% - 99%)    Physical Exam  General: A/ox 3, No acute Distress  Neck: Supple, NO JVD  Cardiac: S1 S2, II/VI midsternal murmur  Pulmonary: CTAB, Breathing unlabored, No Rhonchi/Rales/Wheezing, diminished LLL  Abdomen: Soft, Non -tender, +BS x 4 quads  Extremities: No Rashes, No edema  Neuro: A/o x 3, No focal deficits    LABS:                        13.4   5.58  )-----------( 162      ( 31 May 2018 07:30 )             41.0     05-31    137  |  100  |  36<H>  ----------------------------<  86  4.1   |  26  |  1.62<H>    Ca    8.8      31 May 2018 07:30  Mg     2.6     05-31            RADIOLOGY & ADDITIONAL STUDIES:  < from: Transthoracic Echocardiogram (05.23.18 @ 10:37) >  PROCEDURE: Transthoracic echocardiogram with 2-D, M-Mode  and complete spectral and color flow Doppler.  INDICATION: Heart failure, unspecified (I50.9)  ------------------------------------------------------------------------  DIMENSIONS:  Dimensions:     Normal Values:  LA:     5.1 cm    2.0 - 4.0 cm  Ao:     2.9 cm    2.0 - 3.8 cm  SEPTUM: 0.9 cm    0.6 - 1.2 cm  PWT:    0.9 cm    0.6 - 1.1 cm  LVIDd:  5.5 cm    3.0 - 5.6 cm  LVIDs:  4.9 cm    1.8 - 4.0 cm  Derived Variables:  LVMI: 92 g/m2  RWT: 0.32  Fractional short: 11 %  Ejection Fraction (Teicholtz): 23 %  ------------------------------------------------------------------------  OBSERVATIONS:  Mitral Valve: Mitral annular calcification, tethered mitral  valve. Mild mitral regurgitation.  Aortic Root: Normal aortic root.  Aortic Valve: Calcified trileaflet aortic valve with  decreased opening. Peak transaortic valve gradient equals  106 mm Hg, mean transaortic valve gradient equals 53 mm Hg,  estimated aortic valve area equals 0.6 sqcm (by continuity  equation), consistent with severe aortic stenosis. Mild  aortic regurgitation.  LVOT velocity time integralequals  17 cm.  Left Atrium: Normal left atrium.  LA volume index = 33  cc/m2.  Left Ventricle: Severe global left ventricular systolic  dysfunction.  Right Heart: Normal right atrium. Right ventricular  enlargement with decreased right ventricular systolic  function. Normal tricuspid valve. Minimal tricuspid  regurgitation. Normal pulmonic valve. Minimal pulmonic  regurgitation.  Pericardium/PleuraNormal pericardium with no pericardial  effusion.  ------------------------------------------------------------------------    < end of copied text >        < from: Transthoracic Echocardiogram (09.28.16 @ 08:36) >    < from: Transthoracic Echocardiogram (02.23.15 @ 12:31) >  PROCEDURE: Transthoracic echocardiogram with 2-D, M-Mode  and complete spectral and color flow Doppler.  INDICATION: Chest pain, unspecified (786.50), Murmur  (785.2)  ------------------------------------------------------------------------  DIMENSIONS:  Dimensions:     Normal Values:  LA:     3.6 cm    2.0 - 4.0 cm  Ao:     3.3 cm    2.0 - 3.8 cm  SEPTUM:   ---     0.6 - 1.2 cm  PWT:      ---     0.6 - 1.1 cm  LVIDd:    ---     3.0 - 5.6 cm  LVIDs:    ---     1.8 - 4.0 cm  Peak Velocity (m/sec): AoV=3.5  ------------------------------------------------------------------------  OBSERVATIONS:  Mitral Valve: Normal mitral valve.  Aortic Root: Normal size aortic root. (Ao:3.3 cm).  Aortic Valve: Calcified aortic valve with decreased  opening. Peak transaortic valve gradient equals 49 mm Hg,  mean transaortic valve gradient equals 24 mm Hg, estimated  aortic valve area equals 0.9 sqcm (by continuity equation),  consistent with severe aortic stenosis. Peak left  ventricular outflow tract gradient equals 4 mm Hg.  Left Atrium: Normal left atrium.  Left Ventricle: Endocardium not well visualized; grossly  mild left ventricular dysfunction. Grossly normal left  ventricular internal dimensions and wall thicknesses.  Right Heart: Normal right atrium. The right ventricle is  not well visualized; grossly normal right ventricular  systolic function. Normal tricuspid valve.  Pericardium/PleuraNormal pericardium with no pericardial  effusion.  ------------------------------------------------------------------------    < end of copied text >    PROCEDURE: Transthoracic echocardiogram with 2-D, M-Mode  and complete spectral and color flow Doppler.  INDICATION: Cardiomyopathy, unspecified (I42.9),  Nonrheumatic aortic (valve) stenosis (I35.0)  ------------------------------------------------------------------------  OBSERVATIONS:  Mitral Valve: Mitral annular calcification, otherwise  normal mitral valve. Mild mitral regurgitation.  Aortic Root: Normal aortic root.  Aortic Valve: Aortic valve leaflet morphology not well  visualized.  The valve is heavily calcified. Peak  transaortic valve gradient equals 105 mm Hg, mean  transaorticvalve gradient equals 52 mm Hg, estimated  aortic valve area equals 0.7 sqcm (by continuity equation),  consistent with severe aortic stenosis. Mild aortic  regurgitation.  Left Atrium: Normal left atrium.  LA volume index = 29  cc/m2.  Left Ventricle: Moderate global left ventricular systolic  dysfunction.  Right Heart: Normal right atrium. Normal right ventricular  size and function. Normal tricuspid valve.  Minimal  tricuspid regurgitation. Normal pulmonic valve.  Minimal  pulmonic regurgitation.  Pericardium/PleuraNormal pericardium with no pericardial  effusion.  ------------------------------------------------------------------------  CONCLUSIONS:  1. Mitral annular calcification, otherwise normal mitral  valve. Mild mitral regurgitation.  2. Aortic valve leaflet morphology not well visualized.  The valve is heavily calcified. Peak transaortic valve  gradient equals 105 mm Hg, mean transaortic valve gradient  equals 52 mm Hg, estimated aortic valve area equals 0.7  sqcm (by continuity equation), consistent with severe  aortic stenosis. Mild aortic regurgitation.  3. Moderate global left ventricular systolic dysfunction.  4. Normal right ventricular size and function.  *** Compared with echocardiogram of 2/23/2015, the  transaortic gradients have increased significantly.  In  addition, left ventricular systolic function has  deteriorated.    < end of copied text >

## 2018-05-31 NOTE — H&P ADULT - HISTORY OF PRESENT ILLNESS
86M h/o deafness and muteness, h/o GIB 2/2 crater gastric ulcer, severe AS, CKD III transferred 5/31 from [facility] for TAVR evaluation.    The patient was initially adm to [facility] 5/22-5/31/18 for SOB / GARCIA x1 week.  He went to urgent care and was found to have a L pleff on CXR.  He was adm for CHF exacerbation.  TTE showed EF 23%, severe AS, mild AR, severe global LV systolic dysfunction, and decreased RVSF.  Pulm was on board and attempted a thora, but it was not successful.  The patient was planned for outpatient TAVR workup, but the family preferred transfer to [facility] for inpatient TAVR workup.  His beta blocker was discontinued 2/2 bradycardia.  The patient was noted to have episodes of VT on tele.  LifeVest was recommended, but the patient refused.    Currently, the patient reports feeling "perfect" and has no complaints.

## 2018-05-31 NOTE — PROGRESS NOTE ADULT - PROBLEM SELECTOR PLAN 1
EF 23%, will need ischemic workup via diagnostic catheterization,   cardio f/u appreciated   Currently euvolemic and not symptomatic.   awaiting transfer to [facility] for TAVR today  Spoke to hospitalist at [facility]. Pt accepted. Awaiting bed at [facility].

## 2018-05-31 NOTE — CONSULT NOTE ADULT - PROBLEM SELECTOR RECOMMENDATION 2
Dr. Mohamud to consult for renal optimization prior to contrast studies.     Discussed with patient, primary team, and Daughter in Law Yaa [phone] per patient request.     [id number]

## 2018-05-31 NOTE — H&P ADULT - FAMILY HISTORY
Sibling  Still living? Unknown  Family history of cancer, Age at diagnosis: Age Unknown     Mother  Still living? Unknown  Family history of stomach cancer, Age at diagnosis: Age Unknown

## 2018-05-31 NOTE — PROGRESS NOTE ADULT - PROBLEM SELECTOR PROBLEM 6
Prophylactic measure
CKD (chronic kidney disease) stage 3, GFR 30-59 ml/min
Prophylactic measure
Prophylactic measure

## 2018-05-31 NOTE — H&P ADULT - PROBLEM SELECTOR PLAN 5
- HSQ.    Dispo: pending TAVR evaluation.    Yaa Morales MD  PGY-2 | Internal Medicine  [phone] / [phone]

## 2018-05-31 NOTE — PROGRESS NOTE ADULT - ATTENDING COMMENTS
Patient with left pleural effusion, plan for thorocentesis today as long a fluid still amenable for procedure after diuresis.
Patient with severe AS, consideration for TAVR. To receive Lasix today, if effusion persists, will do thoracentesis in am.
JIMMY to [facility] when bed available. Patient in agreement. Case discussed with Dr. Perez. JIMMY today. Time spent 35 mins
Patient seen and examined by me. Case discussed with resident and agree with the resident's findings and plan as documented in the resident's note. 86M h/o deaf/mute, severe AS (0.7 sqcm), mild AR, mod global LV systolic dysfunction p/w dizziness x 1 week and questionable SOB symptoms p/w small-moderate sized pleural effusion and work-up for CHF exacerbation.    1. SOB:   -mall-moderate sized L pleural effusion, most likely related to the AS although unilateral, unclear etiology or duration but is new compared to CT chest from 2015   -f/u Cardiology recs  -repeat TTE reviewed with severe AS and severe LV global dysfunction   -will obtain a TAVR eval today; may need cath  -c/w gentle diuresis in setting of AS with Lasix 20mg qd and metoprolol   -per pulm recs - unable to perform thoracentesis as effusion is very small; presumed transudative; will touch base with cardiology if IR guided thoracentesis indicated.    2. Dizziness:  -CTH negative for structural/ central causes of dizziness  -PT/OT consult  -per cards, likely from severe AS.     Transfer to telemetry as EF=23% today.
Plan to transfer to [facility] for TAVR
Plan to transfer to [facility] for TAVR. DC today if bed available. Time spent 35 mins
Patient seen and examined by me. Case discussed with resident and agree with the resident's findings and plan as documented in the resident's note. 86M h/o deaf/mute, severe AS (0.7 sqcm), mild AR, mod global LV systolic dysfunction p/w dizziness x 1 week and questionable SOB symptoms p/w small-moderate sized pleural effusion and work-up for CHF exacerbation.    1. SOB:   -mall-moderate sized L pleural effusion, most likely related to the AS although unilateral, unclear etiology or duration but is new compared to CT chest from 2015   -will consult Cardiology  -f/u repeat TTE to assess Heart fxn and severe AS   -will start gentle diuresis in setting of AS with Lasix 20mg qd  -per pulm recs no role for thoracentesis as effusion is small but cardiology recommending tap; will touch base with pulm to try to tap effusion; ddx HF/ Aortic stenosis vs infection vs malignancy    2. Dizziness:  -f/u CTH for structural/ central causes of dizziness  -PT/OT consult  -If patient continues to have sx's would call neurology c/s and check MRI

## 2018-05-31 NOTE — H&P ADULT - PROBLEM SELECTOR PLAN 1
- Severe AS requiring TAVR evaluation.  - Structural Cards and CT Surg on board.  - Plan for cath in AM pending Renal evaluation.  - Plan for cardiac CT likely next week.  - Pt declines LifeVest.

## 2018-05-31 NOTE — PROGRESS NOTE ADULT - ASSESSMENT
severe AS  severe  LV systolic dysfunction     off BB due to bradycardia  on lasix  monitor crt   awaiting [facility] transfer for TAVR

## 2018-05-31 NOTE — CONSULT NOTE ADULT - PROBLEM SELECTOR PROBLEM 1
Aortic valve stenosis, etiology of cardiac valve disease unspecified
Acute systolic congestive heart failure

## 2018-05-31 NOTE — PROGRESS NOTE ADULT - SUBJECTIVE AND OBJECTIVE BOX
Patient is a 86y old  Male who presents with a chief complaint of SOB/GARCIA (26 May 2018 10:38)    SUBJECTIVE / OVERNIGHT EVENTS: Feels well. Denies any complaints. No chest pain or SOB. Resting comfortably in bed. Misses his dog.     MEDICATIONS  (STANDING):  docusate sodium 100 milliGRAM(s) Oral daily  furosemide    Tablet 20 milliGRAM(s) Oral daily  heparin  Injectable 5000 Unit(s) SubCutaneous every 8 hours  senna 2 Tablet(s) Oral at bedtime    Vital Signs Last 24 Hrs  T(C): 36.4 (31 May 2018 05:15), Max: 36.8 (30 May 2018 14:13)  T(F): 97.6 (31 May 2018 05:15), Max: 98.3 (30 May 2018 14:13)  HR: 61 (31 May 2018 05:15) (61 - 80)  BP: 106/56 (31 May 2018 05:15) (106/56 - 125/81)  BP(mean): --  RR: 18 (31 May 2018 05:15) (16 - 18)  SpO2: 99% (31 May 2018 05:15) (98% - 100%)      PHYSICAL EXAM:  GENERAL: NAD, well-developed, mute, communicates via sign language, able to get up from bed and ambulate to the bathroom without any motor or respiratory difficulties   HEAD:  Atraumatic, Normocephalic  EYES: EOMI, PERRLA, conjunctiva and sclera clear  NECK: Supple, No JVD  CHEST/LUNG: decreased bibasilar breath sounds  HEART: Regular rate and rhythm; 4/6 systolic murmur loudest in 2nd R ICS with radiation to carotids, No rubs, or gallops  ABDOMEN: Soft, Nontender, Nondistended; Bowel sounds present  EXTREMITIES:  2+ Peripheral Pulses, No clubbing, cyanosis, or edema  PSYCH: AAOx3  NEUROLOGY: non-focal  SKIN: No rashes or lesions  LABS:                                   13.4   5.58  )-----------( 162      ( 31 May 2018 07:30 )             41.0   05-31    137  |  100  |  36<H>  ----------------------------<  86  4.1   |  26  |  1.62<H>    Ca    8.8      31 May 2018 07:30  Mg     2.6     05-31      RADIOLOGY & ADDITIONAL TESTS:    Imaging Personally Reviewed:    Consultant(s) Notes Reviewed:      Care Discussed with Consultants/Other Providers:    Assessment and Plan:

## 2018-06-01 DIAGNOSIS — K59.00 CONSTIPATION, UNSPECIFIED: ICD-10-CM

## 2018-06-01 DIAGNOSIS — I50.22 CHRONIC SYSTOLIC (CONGESTIVE) HEART FAILURE: ICD-10-CM

## 2018-06-01 DIAGNOSIS — N18.4 CHRONIC KIDNEY DISEASE, STAGE 4 (SEVERE): ICD-10-CM

## 2018-06-01 LAB
ANION GAP SERPL CALC-SCNC: 11 MMOL/L — SIGNIFICANT CHANGE UP (ref 5–17)
APTT BLD: 26.1 SEC — LOW (ref 27.5–37.4)
BUN SERPL-MCNC: 37 MG/DL — HIGH (ref 7–23)
CALCIUM SERPL-MCNC: 8.9 MG/DL — SIGNIFICANT CHANGE UP (ref 8.4–10.5)
CHLORIDE SERPL-SCNC: 102 MMOL/L — SIGNIFICANT CHANGE UP (ref 96–108)
CO2 SERPL-SCNC: 27 MMOL/L — SIGNIFICANT CHANGE UP (ref 22–31)
CREAT SERPL-MCNC: 1.81 MG/DL — HIGH (ref 0.5–1.3)
GLUCOSE SERPL-MCNC: 83 MG/DL — SIGNIFICANT CHANGE UP (ref 70–99)
HCT VFR BLD CALC: 39.7 % — SIGNIFICANT CHANGE UP (ref 39–50)
HGB BLD-MCNC: 12.7 G/DL — LOW (ref 13–17)
INR BLD: 0.91 RATIO — SIGNIFICANT CHANGE UP (ref 0.88–1.16)
MAGNESIUM SERPL-MCNC: 2.5 MG/DL — SIGNIFICANT CHANGE UP (ref 1.6–2.6)
MCHC RBC-ENTMCNC: 32 GM/DL — SIGNIFICANT CHANGE UP (ref 32–36)
MCHC RBC-ENTMCNC: 32.2 PG — SIGNIFICANT CHANGE UP (ref 27–34)
MCV RBC AUTO: 100.8 FL — HIGH (ref 80–100)
PHOSPHATE SERPL-MCNC: 3.6 MG/DL — SIGNIFICANT CHANGE UP (ref 2.5–4.5)
PLATELET # BLD AUTO: 177 K/UL — SIGNIFICANT CHANGE UP (ref 150–400)
POTASSIUM SERPL-MCNC: 4.2 MMOL/L — SIGNIFICANT CHANGE UP (ref 3.5–5.3)
POTASSIUM SERPL-SCNC: 4.2 MMOL/L — SIGNIFICANT CHANGE UP (ref 3.5–5.3)
PROTHROM AB SERPL-ACNC: 10.3 SEC — SIGNIFICANT CHANGE UP (ref 10–13.1)
RBC # BLD: 3.94 M/UL — LOW (ref 4.2–5.8)
RBC # FLD: 13.3 % — SIGNIFICANT CHANGE UP (ref 10.3–14.5)
SODIUM SERPL-SCNC: 140 MMOL/L — SIGNIFICANT CHANGE UP (ref 135–145)
WBC # BLD: 5.98 K/UL — SIGNIFICANT CHANGE UP (ref 3.8–10.5)
WBC # FLD AUTO: 5.98 K/UL — SIGNIFICANT CHANGE UP (ref 3.8–10.5)

## 2018-06-01 PROCEDURE — 99152 MOD SED SAME PHYS/QHP 5/>YRS: CPT

## 2018-06-01 PROCEDURE — 93880 EXTRACRANIAL BILAT STUDY: CPT | Mod: 26

## 2018-06-01 PROCEDURE — 94010 BREATHING CAPACITY TEST: CPT | Mod: 26

## 2018-06-01 PROCEDURE — 93456 R HRT CORONARY ARTERY ANGIO: CPT | Mod: 26

## 2018-06-01 PROCEDURE — 99233 SBSQ HOSP IP/OBS HIGH 50: CPT

## 2018-06-01 PROCEDURE — 76937 US GUIDE VASCULAR ACCESS: CPT | Mod: 26

## 2018-06-01 RX ORDER — FUROSEMIDE 40 MG
40 TABLET ORAL DAILY
Qty: 0 | Refills: 0 | Status: DISCONTINUED | OUTPATIENT
Start: 2018-06-01 | End: 2018-06-03

## 2018-06-01 RX ADMIN — HEPARIN SODIUM 5000 UNIT(S): 5000 INJECTION INTRAVENOUS; SUBCUTANEOUS at 22:23

## 2018-06-01 RX ADMIN — SENNA PLUS 2 TABLET(S): 8.6 TABLET ORAL at 22:23

## 2018-06-01 RX ADMIN — Medication 20 MILLIGRAM(S): at 05:31

## 2018-06-01 NOTE — PROGRESS NOTE ADULT - PROBLEM SELECTOR PLAN 2
- Pt does not appear to be in acute CHF exacerbation at this time.  - Continue diuresis, furosemide increased to 40mg q day.  - Beta blocker discontinued 2/2 bradycardia.  - Strict I/Os.  - Daily weights.  - monitor lytes   - Pt declined LifeVest.

## 2018-06-01 NOTE — CONSULT NOTE ADULT - ASSESSMENT
Severe AS  Chronic systoli CHF  CASH    stable  fu with renal eval   plan for cath   fu with with TAVR team
This is a 85 y/o male with PMH GIB in September 2016 w/ cratered gastric ulcer on EGD in setting of ETOH use (no further episodes), Deaf/Mute, severe AS and CKD transferred from [facility] for TAVR evaluation.   Seen and examined in company of  amenable to TAVR   Cardiac cath for am Cardiac CT this week.
87 y/o male with severe AS with reduced EF seen and evaluated for consideration of TAVR.

## 2018-06-01 NOTE — PROGRESS NOTE ADULT - SUBJECTIVE AND OBJECTIVE BOX
Patient is a 86y old  Male who presents with a chief complaint of TAVR evaluation (31 May 2018 21:27)      SUBJECTIVE / OVERNIGHT EVENTS:     MEDICATIONS  (STANDING):  docusate sodium 100 milliGRAM(s) Oral daily  furosemide    Tablet 40 milliGRAM(s) Oral daily  heparin  Injectable 5000 Unit(s) SubCutaneous every 8 hours  senna 2 Tablet(s) Oral at bedtime    MEDICATIONS  (PRN):      Vital Signs Last 24 Hrs  T(C): 36.7 (01 Jun 2018 04:41), Max: 36.9 (31 May 2018 20:34)  T(F): 98.1 (01 Jun 2018 04:41), Max: 98.4 (31 May 2018 20:34)  HR: 66 (01 Jun 2018 04:41) (66 - 89)  BP: 110/64 (01 Jun 2018 04:41) (101/78 - 117/79)  BP(mean): --  RR: 20 (01 Jun 2018 04:41) (16 - 20)  SpO2: 97% (01 Jun 2018 04:41) (95% - 98%)  CAPILLARY BLOOD GLUCOSE        I&O's Summary    31 May 2018 07:01  -  01 Jun 2018 07:00  --------------------------------------------------------  IN: 200 mL / OUT: 150 mL / NET: 50 mL    01 Jun 2018 07:01  -  01 Jun 2018 13:44  --------------------------------------------------------  IN: 600 mL / OUT: 375 mL / NET: 225 mL        PHYSICAL EXAM:  GENERAL: NAD  HEAD:  Atraumatic, Normocephalic  EYES: EOMI, PERRLA, conjunctiva and sclera clear  NECK: Supple, No JVD  CHEST/LUNG: Clear to auscultation bilaterally; No wheeze  HEART: Regular rate and rhythm; +SM  ABDOMEN: Soft, Nontender, Nondistended; Bowel sounds present  EXTREMITIES:  2+ Peripheral Pulses, No clubbing, cyanosis, or edema  PSYCH: Awake and alert, flat affect   NEUROLOGY: non-focal  SKIN: No rashes or lesions    LABS:                        12.7   5.98  )-----------( 177      ( 01 Jun 2018 08:12 )             39.7     06-01    140  |  102  |  37<H>  ----------------------------<  83  4.2   |  27  |  1.81<H>    Ca    8.9      01 Jun 2018 06:22  Phos  3.6     06-01  Mg     2.5     06-01      PT/INR - ( 01 Jun 2018 08:07 )   PT: 10.3 sec;   INR: 0.91 ratio         PTT - ( 01 Jun 2018 08:07 )  PTT:26.1 sec          RADIOLOGY & ADDITIONAL TESTS:    Imaging Personally Reviewed:  Tele reviewed: SR    Consultant(s) Notes Reviewed:      Care Discussed with Consultants/Other Providers: Patient is a 86y old  Male who presents with a chief complaint of TAVR evaluation (31 May 2018 21:27)      SUBJECTIVE / OVERNIGHT EVENTS: Spoke with patient via . Pt says he feels well, no complaints.     MEDICATIONS  (STANDING):  docusate sodium 100 milliGRAM(s) Oral daily  furosemide    Tablet 40 milliGRAM(s) Oral daily  heparin  Injectable 5000 Unit(s) SubCutaneous every 8 hours  senna 2 Tablet(s) Oral at bedtime    MEDICATIONS  (PRN):      Vital Signs Last 24 Hrs  T(C): 36.7 (01 Jun 2018 04:41), Max: 36.9 (31 May 2018 20:34)  T(F): 98.1 (01 Jun 2018 04:41), Max: 98.4 (31 May 2018 20:34)  HR: 66 (01 Jun 2018 04:41) (66 - 89)  BP: 110/64 (01 Jun 2018 04:41) (101/78 - 117/79)  BP(mean): --  RR: 20 (01 Jun 2018 04:41) (16 - 20)  SpO2: 97% (01 Jun 2018 04:41) (95% - 98%)  CAPILLARY BLOOD GLUCOSE        I&O's Summary    31 May 2018 07:01  -  01 Jun 2018 07:00  --------------------------------------------------------  IN: 200 mL / OUT: 150 mL / NET: 50 mL    01 Jun 2018 07:01  -  01 Jun 2018 13:44  --------------------------------------------------------  IN: 600 mL / OUT: 375 mL / NET: 225 mL        PHYSICAL EXAM:  GENERAL: NAD  HEAD:  Atraumatic, Normocephalic  EYES: EOMI, PERRLA, conjunctiva and sclera clear  NECK: Supple, No JVD  CHEST/LUNG: Clear to auscultation bilaterally; No wheeze  HEART: Regular rate and rhythm; +SM  ABDOMEN: Soft, Nontender, Nondistended; Bowel sounds present  EXTREMITIES:  2+ Peripheral Pulses, No clubbing, cyanosis, or edema  PSYCH: Awake and alert   NEUROLOGY: non-focal  SKIN: No rashes or lesions    LABS:                        12.7   5.98  )-----------( 177      ( 01 Jun 2018 08:12 )             39.7     06-01    140  |  102  |  37<H>  ----------------------------<  83  4.2   |  27  |  1.81<H>    Ca    8.9      01 Jun 2018 06:22  Phos  3.6     06-01  Mg     2.5     06-01      PT/INR - ( 01 Jun 2018 08:07 )   PT: 10.3 sec;   INR: 0.91 ratio         PTT - ( 01 Jun 2018 08:07 )  PTT:26.1 sec          RADIOLOGY & ADDITIONAL TESTS:    Imaging Personally Reviewed:  Tele reviewed: SR    Consultant(s) Notes Reviewed:      Care Discussed with Consultants/Other Providers:

## 2018-06-01 NOTE — PROGRESS NOTE ADULT - PROBLEM SELECTOR PLAN 3
- baseline Cr 1.5-1.7, creatinine near baseline   - Avoid nephrotoxic agents, renally dose medications.  - patient requires contrast studies as part of TAVR w/up   - no IVF due to CHF   - Monitor Cr daily  - check UA and bladder scan   - renal following

## 2018-06-01 NOTE — CONSULT NOTE ADULT - SUBJECTIVE AND OBJECTIVE BOX
CHIEF COMPLAINT:Patient is a 86y old  Male who presents with a chief complaint of TAVR evaluation (31 May 2018 21:27)      HISTORY OF PRESENT ILLNESS:  This is a pleasant gentleman with history as below presented with sob found to have severe AS and LV systolic dysfunction  transferred to [facility] for TAVR eval  feels ok  no sob or cp now     PAST MEDICAL & SURGICAL HISTORY:  Aortic stenosis  [place]  Deaf  No significant past surgical history          MEDICATIONS:  furosemide    Tablet 20 milliGRAM(s) Oral daily  heparin  Injectable 5000 Unit(s) SubCutaneous every 8 hours          docusate sodium 100 milliGRAM(s) Oral daily  senna 2 Tablet(s) Oral at bedtime          FAMILY HISTORY:  Family history of stomach cancer (Mother)  Family history of cancer (Sibling)      Non-contributory    SOCIAL HISTORY:    No tobacco, drugs or etoh    Allergies    No Known Allergies    Intolerances    	    REVIEW OF SYSTEMS:  as above  The rest of the 14 points ROS reviewed and except above they are unremarkable.        PHYSICAL EXAM:  T(C): 36.7 (06-01-18 @ 04:41), Max: 36.9 (05-31-18 @ 20:34)  HR: 66 (06-01-18 @ 04:41) (66 - 89)  BP: 110/64 (06-01-18 @ 04:41) (101/78 - 117/79)  RR: 20 (06-01-18 @ 04:41) (16 - 20)  SpO2: 97% (06-01-18 @ 04:41) (95% - 98%)  Wt(kg): --  I&O's Summary    31 May 2018 07:01  -  01 Jun 2018 07:00  --------------------------------------------------------  IN: 200 mL / OUT: 150 mL / NET: 50 mL    01 Jun 2018 07:01  -  01 Jun 2018 08:49  --------------------------------------------------------  IN: 600 mL / OUT: 0 mL / NET: 600 mL        JVP: Normal  Neck: supple  Lung: clear   CV: S1 S2 , Murmur: zandra   Abd: soft  Ext: No edema  neuro: Awake / alert  Psych: flat affect  Skin: normal     LABS/DATA:    TELEMETRY: 	    ECG:  	   	  CARDIAC MARKERS:                                  12.7   5.98  )-----------( 177      ( 01 Jun 2018 08:12 )             39.7     06-01    140  |  102  |  37<H>  ----------------------------<  83  4.2   |  27  |  1.81<H>    Ca    8.9      01 Jun 2018 06:22  Phos  3.6     06-01  Mg     2.5     06-01      proBNP:   Lipid Profile:   HgA1c:   TSH:

## 2018-06-01 NOTE — PROGRESS NOTE ADULT - PROBLEM SELECTOR PLAN 5
- HSQ.    Dispo: pending TAVR evaluation.    Yaa Morales MD  PGY-2 | Internal Medicine  [phone] / [phone]

## 2018-06-01 NOTE — CHART NOTE - NSCHARTNOTEFT_GEN_A_CORE
Pt was seen and examined.  Briefly this is a elderly male c hx HTN severe as CHF CKD stage 4 awaiting TAVR [place]    Suggest  Check UA and bladder scan  No renal objection to cardiac cath.  No v gram please;  No IVF  Increase lasix to 40mg po qd        Sayed Cade  [facility]  [phone]

## 2018-06-01 NOTE — PROGRESS NOTE ADULT - PROBLEM SELECTOR PLAN 1
- Severe AS requiring TAVR evaluation.  - Structural Cards and CT Surg on board.  - Undergoing cath today  - cardiac CT and carotid dopplers - Severe AS requiring TAVR evaluation.  - Structural Cards and CT Surg on board.  - Undergoing cath today  - carotid dopplers ordered  - CT coronaries Monday

## 2018-06-02 LAB
ALBUMIN SERPL ELPH-MCNC: 3.1 G/DL — LOW (ref 3.3–5)
ALP SERPL-CCNC: 105 U/L — SIGNIFICANT CHANGE UP (ref 40–120)
ALT FLD-CCNC: 10 U/L — SIGNIFICANT CHANGE UP (ref 10–45)
ANION GAP SERPL CALC-SCNC: 11 MMOL/L — SIGNIFICANT CHANGE UP (ref 5–17)
AST SERPL-CCNC: 21 U/L — SIGNIFICANT CHANGE UP (ref 10–40)
BILIRUB SERPL-MCNC: 0.9 MG/DL — SIGNIFICANT CHANGE UP (ref 0.2–1.2)
BUN SERPL-MCNC: 41 MG/DL — HIGH (ref 7–23)
CALCIUM SERPL-MCNC: 8.9 MG/DL — SIGNIFICANT CHANGE UP (ref 8.4–10.5)
CHLORIDE SERPL-SCNC: 99 MMOL/L — SIGNIFICANT CHANGE UP (ref 96–108)
CO2 SERPL-SCNC: 26 MMOL/L — SIGNIFICANT CHANGE UP (ref 22–31)
CREAT SERPL-MCNC: 1.79 MG/DL — HIGH (ref 0.5–1.3)
GLUCOSE SERPL-MCNC: 81 MG/DL — SIGNIFICANT CHANGE UP (ref 70–99)
HCT VFR BLD CALC: 39.9 % — SIGNIFICANT CHANGE UP (ref 39–50)
HGB BLD-MCNC: 13.1 G/DL — SIGNIFICANT CHANGE UP (ref 13–17)
MAGNESIUM SERPL-MCNC: 2.4 MG/DL — SIGNIFICANT CHANGE UP (ref 1.6–2.6)
MCHC RBC-ENTMCNC: 32.8 GM/DL — SIGNIFICANT CHANGE UP (ref 32–36)
MCHC RBC-ENTMCNC: 32.9 PG — SIGNIFICANT CHANGE UP (ref 27–34)
MCV RBC AUTO: 100.3 FL — HIGH (ref 80–100)
PLATELET # BLD AUTO: 177 K/UL — SIGNIFICANT CHANGE UP (ref 150–400)
POTASSIUM SERPL-MCNC: 4 MMOL/L — SIGNIFICANT CHANGE UP (ref 3.5–5.3)
POTASSIUM SERPL-SCNC: 4 MMOL/L — SIGNIFICANT CHANGE UP (ref 3.5–5.3)
PROT SERPL-MCNC: 6.4 G/DL — SIGNIFICANT CHANGE UP (ref 6–8.3)
RBC # BLD: 3.98 M/UL — LOW (ref 4.2–5.8)
RBC # FLD: 13.3 % — SIGNIFICANT CHANGE UP (ref 10.3–14.5)
SODIUM SERPL-SCNC: 136 MMOL/L — SIGNIFICANT CHANGE UP (ref 135–145)
WBC # BLD: 6.05 K/UL — SIGNIFICANT CHANGE UP (ref 3.8–10.5)
WBC # FLD AUTO: 6.05 K/UL — SIGNIFICANT CHANGE UP (ref 3.8–10.5)

## 2018-06-02 PROCEDURE — 99233 SBSQ HOSP IP/OBS HIGH 50: CPT

## 2018-06-02 RX ADMIN — HEPARIN SODIUM 5000 UNIT(S): 5000 INJECTION INTRAVENOUS; SUBCUTANEOUS at 12:04

## 2018-06-02 RX ADMIN — Medication 100 MILLIGRAM(S): at 12:04

## 2018-06-02 RX ADMIN — HEPARIN SODIUM 5000 UNIT(S): 5000 INJECTION INTRAVENOUS; SUBCUTANEOUS at 05:47

## 2018-06-02 RX ADMIN — SENNA PLUS 2 TABLET(S): 8.6 TABLET ORAL at 23:07

## 2018-06-02 RX ADMIN — HEPARIN SODIUM 5000 UNIT(S): 5000 INJECTION INTRAVENOUS; SUBCUTANEOUS at 23:07

## 2018-06-02 RX ADMIN — Medication 40 MILLIGRAM(S): at 05:47

## 2018-06-02 NOTE — PROGRESS NOTE ADULT - PROBLEM SELECTOR PLAN 3
Baseline Cr 1.5-1.7, creatinine near baseline   Avoid nephrotoxic agents, renally dose medications.  Patient requires contrast studies as part of TAVR w/up   No IVF due to CHF   Monitor renal function   Check UA and bladder scan   Renal following

## 2018-06-02 NOTE — PROGRESS NOTE ADULT - PROBLEM SELECTOR PLAN 2
Pt does not appear to be in acute CHF exacerbation at this time.  Continue diuresis, furosemide  40mg q day.  Beta blocker discontinued 2/2 bradycardia.  Strict I/Os.  Daily weights.  Monitor lytes   Patient declined LifeVest.

## 2018-06-02 NOTE — PROGRESS NOTE ADULT - SUBJECTIVE AND OBJECTIVE BOX
BRUNILDANICHO  MRN-[id number]  Physician: Cade More [phone]    Chief Complain:Patient is a 86y old  Male who presents with a chief complaint of TAVR evaluation (31 May 2018 21:27)    SUBJECTIVE / OVERNIGHT EVENTS:  Patient seen and examined at bedside with  Illissa. Patient denies chest pain, palpitation, nausea, vomiting.     Review of Systems:  14 point ROS negative in detail except for the above:    MEDICATIONS  (STANDING):  docusate sodium 100 milliGRAM(s) Oral daily  furosemide    Tablet 40 milliGRAM(s) Oral daily  heparin  Injectable 5000 Unit(s) SubCutaneous every 8 hours  senna 2 Tablet(s) Oral at bedtime    MEDICATIONS  (PRN):      T(C): 36.6 (06-02-18 @ 05:10), Max: 36.9 (06-01-18 @ 16:45)  HR: 67 (06-02-18 @ 05:10) (64 - 82)  BP: 138/89 (06-02-18 @ 05:10) (119/69 - 138/89)  RR: 18 (06-02-18 @ 05:10) (18 - 18)  SpO2: 93% (06-02-18 @ 05:10) (93% - 95%)    CAPILLARY BLOOD GLUCOSE          I&O's Summary    01 Jun 2018 07:01  -  02 Jun 2018 07:00  --------------------------------------------------------  IN: 1000 mL / OUT: 775 mL / NET: 225 mL    02 Jun 2018 07:01  -  02 Jun 2018 11:23  --------------------------------------------------------  IN: 240 mL / OUT: 0 mL / NET: 240 mL        Allergies    No Known Allergies    Intolerances    PHYSICAL EXAM:  GENERAL: Not in distress, well-developed  HEAD:  Atraumatic, Normocephalic  EYES: EOMI, PERRLA, conjunctiva and sclera clear  NECK: Supple, No JVD  CHEST/LUNG: Clear to auscultation bilaterally; No wheeze  HEART: Regular rate and rhythm; systolic murmurs   ABDOMEN: Soft, Nontender, Nondistended; Bowel sounds present  EXTREMITIES: No joint effusions, good muscle tone and bulk  PSYCH: Normal mood and affect, alert and oriented  NEUROLOGY: Grossly intact   SKIN: No rashes or lesions    LABS:                        13.1   6.05  )-----------( 177      ( 02 Jun 2018 07:56 )             39.9     06-02    136  |  99  |  41<H>  ----------------------------<  81  4.0   |  26  |  1.79<H>    Ca    8.9      02 Jun 2018 06:42  Phos  3.6     06-01  Mg     2.4     06-02    TPro  6.4  /  Alb  3.1<L>  /  TBili  0.9  /  DBili  x   /  AST  21  /  ALT  10  /  AlkPhos  105  06-02    LIVER FUNCTIONS - ( 02 Jun 2018 06:42 )  Alb: 3.1 g/dL / Pro: 6.4 g/dL / ALK PHOS: 105 U/L / ALT: 10 U/L / AST: 21 U/L / GGT: x           PT/INR - ( 01 Jun 2018 08:07 )   PT: 10.3 sec;   INR: 0.91 ratio         PTT - ( 01 Jun 2018 08:07 )  PTT:26.1 sec        Blood Cultures    RADIOLOGY & ADDITIONAL TESTS:    Imaging:    < from: VA Duplex Carotid, Bilat (06.01.18 @ 15:54) >  No evidence of significant stenosis of either carotid artery.    Measurement of carotid stenosis is based on velocity parameters that   correlate the residual internal carotid diameter with that of the more   distal vessel in accordance with a method such as the [place] American   Symptomatic Carotid Endarterectomy Trial (NASCET).    Telemetry: Sinus 60-10    Consultant(s) Notes Reviewed:  Nephrology, Cardiology

## 2018-06-02 NOTE — PROGRESS NOTE ADULT - SUBJECTIVE AND OBJECTIVE BOX
Subjective: Patient seen and examined. No new events except as noted.     SUBJECTIVE/ROS:  No chest pain, dyspnea, palpitation, or dizziness.       MEDICATIONS:  MEDICATIONS  (STANDING):  docusate sodium 100 milliGRAM(s) Oral daily  furosemide    Tablet 40 milliGRAM(s) Oral daily  heparin  Injectable 5000 Unit(s) SubCutaneous every 8 hours  senna 2 Tablet(s) Oral at bedtime      PHYSICAL EXAM:  T(C): 36.6 (06-02-18 @ 05:10), Max: 36.9 (06-01-18 @ 16:45)  HR: 67 (06-02-18 @ 05:10) (64 - 82)  BP: 138/89 (06-02-18 @ 05:10) (119/69 - 138/89)  RR: 18 (06-02-18 @ 05:10) (18 - 18)  SpO2: 93% (06-02-18 @ 05:10) (93% - 95%)  Wt(kg): --  I&O's Summary    01 Jun 2018 07:01  -  02 Jun 2018 07:00  --------------------------------------------------------  IN: 1000 mL / OUT: 775 mL / NET: 225 mL        JVP: Normal  Neck: supple  Lung: clear   CV: S1 S2 , Murmur: zandra   Abd: soft  Ext: No edema  neuro: Awake / alert  Psych: flat affect  Skin: normal     LABS/DATA:    CARDIAC MARKERS:                                12.7   5.98  )-----------( 177      ( 01 Jun 2018 08:12 )             39.7     06-02    136  |  99  |  41<H>  ----------------------------<  81  4.0   |  26  |  1.79<H>    Ca    8.9      02 Jun 2018 06:42  Phos  3.6     06-01  Mg     2.4     06-02    TPro  6.4  /  Alb  3.1<L>  /  TBili  0.9  /  DBili  x   /  AST  21  /  ALT  10  /  AlkPhos  105  06-02    proBNP:   Lipid Profile:   HgA1c:   TSH:     TELE:  EKG:

## 2018-06-02 NOTE — PROGRESS NOTE ADULT - PROBLEM SELECTOR PLAN 1
Severe AS requiring TAVR evaluation.  Structural Cards and CT Surg on board.  S/p cardiac cath on Friday   CT coronaries on  Monday  Cardiac monitoring

## 2018-06-02 NOTE — PROGRESS NOTE ADULT - SUBJECTIVE AND OBJECTIVE BOX
NEPHROLOGY-[facility]  [phone]  Merissa Holman NP      Patient seen and examined at bedside. Awake, alert, responsive. No acute events noted. Not sob, denies chest pain, nausea/vomiting.  In good spirits    MEDICATIONS  (STANDING):  docusate sodium 100 milliGRAM(s) Oral daily  furosemide    Tablet 40 milliGRAM(s) Oral daily  heparin  Injectable 5000 Unit(s) SubCutaneous every 8 hours  senna 2 Tablet(s) Oral at bedtime      VITAL:  T(C): , Max: 36.9 (06-01-18 @ 16:45)  T(F): , Max: 98.4 (06-01-18 @ 16:45)  HR: 67 (06-02-18 @ 05:10)  BP: 138/89 (06-02-18 @ 05:10)  BP(mean): --  RR: 18 (06-02-18 @ 05:10)  SpO2: 93% (06-02-18 @ 05:10)  Wt(kg): --    I and O's:    06-01 @ 07:01  -  06-02 @ 07:00  --------------------------------------------------------  IN: 1000 mL / OUT: 775 mL / NET: 225 mL    06-02 @ 07:01  -  06-02 @ 11:02  --------------------------------------------------------  IN: 240 mL / OUT: 0 mL / NET: 240 mL          PHYSICAL EXAM:    Constitutional: NAD  HEENT: PERRLA, EOMI    Neck:  No JVD  Respiratory: CTAB/L  Cardiovascular: S1 and S2  Gastrointestinal: BS+, soft, NT/ND  Extremities: No peripheral edema  : No Gonzáles  Skin: No rashes  Access: Not applicable    LABS:                        13.1   6.05  )-----------( 177      ( 02 Jun 2018 07:56 )             39.9       02 Jun 2018 06:42    136    |  99     |  41<H>  ----------------------------<  81     4.0     |  26     |  1.79<H>  01 Jun 2018 06:22    140    |  102    |  37<H>  ----------------------------<  83     4.2     |  27     |  1.81<H>    Ca    8.9        02 Jun 2018 06:42  Ca    8.9        01 Jun 2018 06:22  Phos  3.6       01 Jun 2018 06:22  Mg     2.4       02 Jun 2018 06:42  Mg     2.5       01 Jun 2018 06:22    TPro  6.4    /  Alb  3.1<L>  /  TBili  0.9    /  DBili  x      /  AST  21     /  ALT  10     /  AlkPhos  105    02 Jun 2018 06:42      Urine Studies:      RADIOLOGY & ADDITIONAL STUDIES:      ASSESSMENT/RECOMMENDATION:    86-year-old male with PMH of Hypertension, Chronic 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  Goals of therapy are to get him to not only the cardiac catheterization, but also the CT scan of his coronaries.  1.	Renal.  Will not give IV fluids with his current cardiac function.  Continue with the Lasix 40 mg once a day. Creatinine stable post cardiac cath. Check UA.  Quantify proteinuria burden.  Patient is aware of his renal dysfunction and understands the risk of contrast including CASH.  Trend renal profile daily to assess for radiocontrast induced nephropathy.    2.	Cardiology.  s/p Cardiac catheterization yesterday with limited dye.  CT of the coronaries hopefully on Monday.  3.	CTS.  Eventual TAVR hopefully this hospitalization.

## 2018-06-03 LAB
ALBUMIN SERPL ELPH-MCNC: 3.4 G/DL — SIGNIFICANT CHANGE UP (ref 3.3–5)
ALP SERPL-CCNC: 110 U/L — SIGNIFICANT CHANGE UP (ref 40–120)
ALT FLD-CCNC: 10 U/L — SIGNIFICANT CHANGE UP (ref 10–45)
ANION GAP SERPL CALC-SCNC: 11 MMOL/L — SIGNIFICANT CHANGE UP (ref 5–17)
APPEARANCE UR: CLEAR — SIGNIFICANT CHANGE UP
AST SERPL-CCNC: 17 U/L — SIGNIFICANT CHANGE UP (ref 10–40)
BILIRUB SERPL-MCNC: 1.1 MG/DL — SIGNIFICANT CHANGE UP (ref 0.2–1.2)
BILIRUB UR-MCNC: NEGATIVE — SIGNIFICANT CHANGE UP
BUN SERPL-MCNC: 39 MG/DL — HIGH (ref 7–23)
CALCIUM SERPL-MCNC: 9 MG/DL — SIGNIFICANT CHANGE UP (ref 8.4–10.5)
CHLORIDE SERPL-SCNC: 99 MMOL/L — SIGNIFICANT CHANGE UP (ref 96–108)
CO2 SERPL-SCNC: 28 MMOL/L — SIGNIFICANT CHANGE UP (ref 22–31)
COLOR SPEC: YELLOW — SIGNIFICANT CHANGE UP
CREAT SERPL-MCNC: 1.97 MG/DL — HIGH (ref 0.5–1.3)
DIFF PNL FLD: NEGATIVE — SIGNIFICANT CHANGE UP
GLUCOSE SERPL-MCNC: 96 MG/DL — SIGNIFICANT CHANGE UP (ref 70–99)
GLUCOSE UR QL: NEGATIVE — SIGNIFICANT CHANGE UP
HCT VFR BLD CALC: 41.2 % — SIGNIFICANT CHANGE UP (ref 39–50)
HGB BLD-MCNC: 13 G/DL — SIGNIFICANT CHANGE UP (ref 13–17)
KETONES UR-MCNC: NEGATIVE — SIGNIFICANT CHANGE UP
LEUKOCYTE ESTERASE UR-ACNC: ABNORMAL
MAGNESIUM SERPL-MCNC: 2.3 MG/DL — SIGNIFICANT CHANGE UP (ref 1.6–2.6)
MCHC RBC-ENTMCNC: 31.6 GM/DL — LOW (ref 32–36)
MCHC RBC-ENTMCNC: 31.7 PG — SIGNIFICANT CHANGE UP (ref 27–34)
MCV RBC AUTO: 100.5 FL — HIGH (ref 80–100)
NITRITE UR-MCNC: NEGATIVE — SIGNIFICANT CHANGE UP
PH UR: 6 — SIGNIFICANT CHANGE UP (ref 5–8)
PHOSPHATE SERPL-MCNC: 3.2 MG/DL — SIGNIFICANT CHANGE UP (ref 2.5–4.5)
PLATELET # BLD AUTO: 187 K/UL — SIGNIFICANT CHANGE UP (ref 150–400)
POTASSIUM SERPL-MCNC: 3.9 MMOL/L — SIGNIFICANT CHANGE UP (ref 3.5–5.3)
POTASSIUM SERPL-SCNC: 3.9 MMOL/L — SIGNIFICANT CHANGE UP (ref 3.5–5.3)
PROT SERPL-MCNC: 6.7 G/DL — SIGNIFICANT CHANGE UP (ref 6–8.3)
PROT UR-MCNC: SIGNIFICANT CHANGE UP
RBC # BLD: 4.1 M/UL — LOW (ref 4.2–5.8)
RBC # FLD: 13.4 % — SIGNIFICANT CHANGE UP (ref 10.3–14.5)
SODIUM SERPL-SCNC: 138 MMOL/L — SIGNIFICANT CHANGE UP (ref 135–145)
SP GR SPEC: 1.02 — SIGNIFICANT CHANGE UP (ref 1.01–1.02)
UROBILINOGEN FLD QL: NEGATIVE — SIGNIFICANT CHANGE UP
WBC # BLD: 8.3 K/UL — SIGNIFICANT CHANGE UP (ref 3.8–10.5)
WBC # FLD AUTO: 8.3 K/UL — SIGNIFICANT CHANGE UP (ref 3.8–10.5)

## 2018-06-03 PROCEDURE — 99233 SBSQ HOSP IP/OBS HIGH 50: CPT

## 2018-06-03 RX ADMIN — SENNA PLUS 2 TABLET(S): 8.6 TABLET ORAL at 21:26

## 2018-06-03 RX ADMIN — Medication 100 MILLIGRAM(S): at 13:06

## 2018-06-03 RX ADMIN — HEPARIN SODIUM 5000 UNIT(S): 5000 INJECTION INTRAVENOUS; SUBCUTANEOUS at 13:06

## 2018-06-03 RX ADMIN — HEPARIN SODIUM 5000 UNIT(S): 5000 INJECTION INTRAVENOUS; SUBCUTANEOUS at 21:26

## 2018-06-03 RX ADMIN — HEPARIN SODIUM 5000 UNIT(S): 5000 INJECTION INTRAVENOUS; SUBCUTANEOUS at 05:53

## 2018-06-03 RX ADMIN — Medication 40 MILLIGRAM(S): at 05:53

## 2018-06-03 NOTE — PROGRESS NOTE ADULT - PROBLEM SELECTOR PLAN 2
Pt does not appear to be in acute CHF exacerbation at this time.  Beta blocker discontinued 2/2 bradycardia.  Given the worsening of renal function, will hold Lasix until CT is done  Strict I/Os.  Daily weights.  Monitor lytes   Patient declined LifeVest.

## 2018-06-03 NOTE — PROGRESS NOTE ADULT - SUBJECTIVE AND OBJECTIVE BOX
NEPHROLOGY-[facility]  [phone]  Merissa Holman NP      Patient seen and examined at bedside. Awake, alert oriented. In good spirits. No acute overnight events noted.  Denies sob, chest pain, nausea/vomiting        MEDICATIONS  (STANDING):  docusate sodium 100 milliGRAM(s) Oral daily  furosemide    Tablet 40 milliGRAM(s) Oral daily  heparin  Injectable 5000 Unit(s) SubCutaneous every 8 hours  senna 2 Tablet(s) Oral at bedtime      VITAL:  T(C): , Max: 37.2 (18 @ 12:39)  T(F): , Max: 99 (18 @ 12:39)  HR: 77 (18 @ 20:22)  BP: 110/67 (18 @ 05:50)  BP(mean): --  RR: 18 (18 @ 20:22)  SpO2: 93% (18 @ 20:22)  Wt(kg): --    I and O's:     @ 07:01  -  03 @ 07:00  --------------------------------------------------------  IN: 717 mL / OUT: 375 mL / NET: 342 mL          PHYSICAL EXAM:    Constitutional: NAD   Neck:  No JVD  Respiratory: CTAB/L  Cardiovascular: S1 and S2  Gastrointestinal: BS+, soft, NT/ND  Extremities: No peripheral edema  : No Gonzáles  Skin: No rashes  Access: Not applicable    LABS:                        13.0   8.30  )-----------( 187      ( 2018 08:41 )             41.2     2018 07:01    138    |  99     |  39<H>  ----------------------------<  96     3.9     |  28     |  1.97<H>  2018 06:42    136    |  99     |  41<H>  ----------------------------<  81     4.0     |  26     |  1.79<H>    Ca    9.0        2018 07:01  Ca    8.9        2018 06:42  Phos  3.2       2018 07:01  Mg     2.3       2018 07:01  Mg     2.4       2018 06:42    TPro  6.7    /  Alb  3.4    /  TBili  1.1    /  DBili  x      /  AST  17     /  ALT  10     /  AlkPhos  110    2018 07:01  TPro  6.4    /  Alb  3.1<L>  /  TBili  0.9    /  DBili  x      /  AST  21     /  ALT  10     /  AlkPhos  105    2018 06:42        Urine Studies:  Urinalysis Basic - ( 2018 06:37 )    Color: Yellow / Appearance: Clear / S.025 / pH: x  Gluc: x / Ketone: Negative  / Bili: Negative / Urobili: Negative   Blood: x / Protein: Trace / Nitrite: Negative   Leuk Esterase: Moderate / RBC: 3-5 /HPF / WBC 11-25 /HPF   Sq Epi: x / Non Sq Epi: x / Bacteria: x        RADIOLOGY & ADDITIONAL STUDIES:      ASSESSMENT/RECOMMENDATION:    86 yea rold male with PMH of Hypertension, Chronic 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  Goals of therapy are to get him to not only the cardiac catheterization, but also the CT scan of his coronaries.  1.	Renal. Creatinine rising today post cardiac cath; non-oliguric Would not give IV fluids with his current cardiac function.  Hold Lasix for now (today's AM dose already given). Check daily BMP    2.	Cardiology.  s/p Cardiac catheterization yesterday with limited dye.  If creatinine continues to rise tomorrow would hold off on CT coronaries  3.	CTS.  Eventual TAVR   Discussed with unit NP

## 2018-06-03 NOTE — PHYSICAL THERAPY INITIAL EVALUATION ADULT - DISCHARGE DISPOSITION, PT EVAL
home w/ home PT/home/home w/ assist/Home with Home PT for strengthening, bed mob, transfer, gait and balance training. home with assist as needed

## 2018-06-03 NOTE — PHYSICAL THERAPY INITIAL EVALUATION ADULT - ADDITIONAL COMMENTS
as per daughter in law via phone, pt resides in a  with son, no stair to enter, PTA, pt amb (I) with SC, (I) with ADls.

## 2018-06-03 NOTE — PROGRESS NOTE ADULT - SUBJECTIVE AND OBJECTIVE BOX
Subjective: Patient seen and examined. No new events except as noted.     SUBJECTIVE/ROS:        MEDICATIONS:  MEDICATIONS  (STANDING):  docusate sodium 100 milliGRAM(s) Oral daily  furosemide    Tablet 40 milliGRAM(s) Oral daily  heparin  Injectable 5000 Unit(s) SubCutaneous every 8 hours  senna 2 Tablet(s) Oral at bedtime      PHYSICAL EXAM:  T(C): 36.7 (06-02-18 @ 20:22), Max: 37.2 (06-02-18 @ 12:39)  HR: 77 (06-02-18 @ 20:22) (77 - 91)  BP: 110/67 (06-03-18 @ 05:50) (103/68 - 110/67)  RR: 18 (06-02-18 @ 20:22) (18 - 19)  SpO2: 93% (06-02-18 @ 20:22) (93% - 93%)  Wt(kg): --  I&O's Summary    02 Jun 2018 07:01  -  03 Jun 2018 07:00  --------------------------------------------------------  IN: 717 mL / OUT: 375 mL / NET: 342 mL    JVP: Normal  Neck: supple  Lung: clear   CV: S1 S2 , Murmur:  Abd: soft  Ext: No edema  neuro: Awake / alert  Psych: flat affect  Skin: normal       LABS/DATA:    CARDIAC MARKERS:                                13.1   6.05  )-----------( 177      ( 02 Jun 2018 07:56 )             39.9     06-03    138  |  99  |  39<H>  ----------------------------<  96  3.9   |  28  |  1.97<H>    Ca    9.0      03 Jun 2018 07:01  Phos  3.2     06-03  Mg     2.3     06-03    TPro  6.7  /  Alb  3.4  /  TBili  1.1  /  DBili  x   /  AST  17  /  ALT  10  /  AlkPhos  110  06-03    proBNP:   Lipid Profile:   HgA1c:   TSH:     TELE:  EKG:

## 2018-06-03 NOTE — PHYSICAL THERAPY INITIAL EVALUATION ADULT - PERTINENT HX OF CURRENT PROBLEM, REHAB EVAL
86yoM deaf and mute, with AS, CKD III, transferred from [facility] for TAVR eval, p/w SOB, VA duplex b/l carotid arteries (-), CT Chest 5/26, mod L pleural effusion, CXR 5/24, L pleural effusion, ECG 5/22, L BBB, CT head (-)

## 2018-06-03 NOTE — PROGRESS NOTE ADULT - PROBLEM SELECTOR PLAN 3
Baseline Cr 1.5-1.7, creatinine near baseline   Avoid nephrotoxic agents, renally dose medications.  Patient requires contrast studies as part of TAVR w/up   No IVF due to CHF   Monitor renal function   Given the worsening of renal function, will hold Lasix until CT is done  Renal following

## 2018-06-03 NOTE — PROGRESS NOTE ADULT - PROBLEM SELECTOR PLAN 1
Severe AS requiring TAVR evaluation.  Structural Cards and CT Surg on board.  S/p cardiac cath on Friday   CT coronaries on  Monday  Cardiac monitoring  Given the worsening of renal function, will hold Lasix until CT is done

## 2018-06-03 NOTE — PROGRESS NOTE ADULT - SUBJECTIVE AND OBJECTIVE BOX
NANCYDONTEMORIAHNICHO  MRN-[id number]  Physician: Cade More [phone]    Chief Complain:Patient is a 86y old  Male who presents with a chief complaint of TAVR evaluation (31 May 2018 21:27)      SUBJECTIVE / OVERNIGHT EVENTS:  Patient seen and examined at bedside. patient denies any shortness of breath, chest pain and palpitation.     Review of Systems:  14 point ROS negative in detail except for the above:     MEDICATIONS  (STANDING):  docusate sodium 100 milliGRAM(s) Oral daily  heparin  Injectable 5000 Unit(s) SubCutaneous every 8 hours  senna 2 Tablet(s) Oral at bedtime    MEDICATIONS  (PRN):      T(C): 36.7 (18 @ 20:22), Max: 37.2 (18 @ 12:39)  HR: 77 (18 @ 20:22) (77 - 91)  BP: 110/67 (18 @ 05:50) (103/68 - 110/67)  RR: 18 (18 @ 20:22) (18 - 19)  SpO2: 93% (18 @ 20:22) (93% - 93%)    CAPILLARY BLOOD GLUCOSE          I&O's Summary    2018 07:01  -  2018 07:00  --------------------------------------------------------  IN: 717 mL / OUT: 375 mL / NET: 342 mL        Allergies    No Known Allergies    Intolerances    PHYSICAL EXAM:  GENERAL: Not in distress, well-developed  HEAD:  Atraumatic, Normocephalic  EYES: EOMI, PERRLA, conjunctiva and sclera clear  NECK: Supple  CHEST/LUNG: Clear to auscultation bilaterally; No wheeze  HEART: Regular rate and rhythm; systolic murmurs  ABDOMEN: Soft, Nontender, Nondistended; Bowel sounds present  EXTREMITIES: No joint effusions, good muscle tone and bulk  PSYCH: Normal mood and affect, alert and oriented  NEUROLOGY: Deaf    SKIN: No rashes or lesions    LABS:                        13.0   8.30  )-----------( 187      ( 2018 08:41 )             41.2     06-03    138  |  99  |  39<H>  ----------------------------<  96  3.9   |  28  |  1.97<H>    Ca    9.0      2018 07:01  Phos  3.2     06-03  Mg     2.3     06-03    TPro  6.7  /  Alb  3.4  /  TBili  1.1  /  DBili  x   /  AST  17  /  ALT  10  /  AlkPhos  110  06-03    LIVER FUNCTIONS - ( 2018 07:01 )  Alb: 3.4 g/dL / Pro: 6.7 g/dL / ALK PHOS: 110 U/L / ALT: 10 U/L / AST: 17 U/L / GGT: x                 Urinalysis Basic - ( 2018 06:37 )    Color: Yellow / Appearance: Clear / S.025 / pH: x  Gluc: x / Ketone: Negative  / Bili: Negative / Urobili: Negative   Blood: x / Protein: Trace / Nitrite: Negative   Leuk Esterase: Moderate / RBC: 3-5 /HPF / WBC 11-25 /HPF   Sq Epi: x / Non Sq Epi: x / Bacteria: x      Blood Cultures    RADIOLOGY & ADDITIONAL TESTS:    Telemetry: sinus 60-80 with PVC     Consultant(s) Notes Reviewed:  Nephrology, cardiology

## 2018-06-04 LAB
ALBUMIN SERPL ELPH-MCNC: 3.3 G/DL — SIGNIFICANT CHANGE UP (ref 3.3–5)
ALP SERPL-CCNC: 100 U/L — SIGNIFICANT CHANGE UP (ref 40–120)
ALT FLD-CCNC: 6 U/L — LOW (ref 10–45)
ANION GAP SERPL CALC-SCNC: 11 MMOL/L — SIGNIFICANT CHANGE UP (ref 5–17)
AST SERPL-CCNC: 16 U/L — SIGNIFICANT CHANGE UP (ref 10–40)
BILIRUB SERPL-MCNC: 1.2 MG/DL — SIGNIFICANT CHANGE UP (ref 0.2–1.2)
BUN SERPL-MCNC: 39 MG/DL — HIGH (ref 7–23)
CALCIUM SERPL-MCNC: 8.9 MG/DL — SIGNIFICANT CHANGE UP (ref 8.4–10.5)
CHLORIDE SERPL-SCNC: 98 MMOL/L — SIGNIFICANT CHANGE UP (ref 96–108)
CO2 SERPL-SCNC: 29 MMOL/L — SIGNIFICANT CHANGE UP (ref 22–31)
CREAT SERPL-MCNC: 1.94 MG/DL — HIGH (ref 0.5–1.3)
GLUCOSE SERPL-MCNC: 89 MG/DL — SIGNIFICANT CHANGE UP (ref 70–99)
HCT VFR BLD CALC: 40 % — SIGNIFICANT CHANGE UP (ref 39–50)
HGB BLD-MCNC: 12.7 G/DL — LOW (ref 13–17)
MAGNESIUM SERPL-MCNC: 2.5 MG/DL — SIGNIFICANT CHANGE UP (ref 1.6–2.6)
MCHC RBC-ENTMCNC: 31.8 GM/DL — LOW (ref 32–36)
MCHC RBC-ENTMCNC: 32.2 PG — SIGNIFICANT CHANGE UP (ref 27–34)
MCV RBC AUTO: 101.3 FL — HIGH (ref 80–100)
PHOSPHATE SERPL-MCNC: 3.2 MG/DL — SIGNIFICANT CHANGE UP (ref 2.5–4.5)
PLATELET # BLD AUTO: 196 K/UL — SIGNIFICANT CHANGE UP (ref 150–400)
POTASSIUM SERPL-MCNC: 4 MMOL/L — SIGNIFICANT CHANGE UP (ref 3.5–5.3)
POTASSIUM SERPL-SCNC: 4 MMOL/L — SIGNIFICANT CHANGE UP (ref 3.5–5.3)
PROT SERPL-MCNC: 6.7 G/DL — SIGNIFICANT CHANGE UP (ref 6–8.3)
RBC # BLD: 3.95 M/UL — LOW (ref 4.2–5.8)
RBC # FLD: 13.4 % — SIGNIFICANT CHANGE UP (ref 10.3–14.5)
SODIUM SERPL-SCNC: 138 MMOL/L — SIGNIFICANT CHANGE UP (ref 135–145)
WBC # BLD: 7.72 K/UL — SIGNIFICANT CHANGE UP (ref 3.8–10.5)
WBC # FLD AUTO: 7.72 K/UL — SIGNIFICANT CHANGE UP (ref 3.8–10.5)

## 2018-06-04 RX ORDER — ACETAMINOPHEN 500 MG
650 TABLET ORAL ONCE
Qty: 0 | Refills: 0 | Status: COMPLETED | OUTPATIENT
Start: 2018-06-04 | End: 2018-06-04

## 2018-06-04 RX ORDER — FUROSEMIDE 40 MG
40 TABLET ORAL DAILY
Qty: 0 | Refills: 0 | Status: DISCONTINUED | OUTPATIENT
Start: 2018-06-05 | End: 2018-06-06

## 2018-06-04 RX ADMIN — HEPARIN SODIUM 5000 UNIT(S): 5000 INJECTION INTRAVENOUS; SUBCUTANEOUS at 13:17

## 2018-06-04 RX ADMIN — Medication 100 MILLIGRAM(S): at 12:14

## 2018-06-04 RX ADMIN — HEPARIN SODIUM 5000 UNIT(S): 5000 INJECTION INTRAVENOUS; SUBCUTANEOUS at 05:30

## 2018-06-04 RX ADMIN — Medication 650 MILLIGRAM(S): at 21:42

## 2018-06-04 RX ADMIN — Medication 650 MILLIGRAM(S): at 22:12

## 2018-06-04 RX ADMIN — SENNA PLUS 2 TABLET(S): 8.6 TABLET ORAL at 21:42

## 2018-06-04 RX ADMIN — Medication 650 MILLIGRAM(S): at 13:02

## 2018-06-04 RX ADMIN — Medication 650 MILLIGRAM(S): at 12:14

## 2018-06-04 RX ADMIN — HEPARIN SODIUM 5000 UNIT(S): 5000 INJECTION INTRAVENOUS; SUBCUTANEOUS at 21:42

## 2018-06-04 NOTE — PROGRESS NOTE ADULT - PROBLEM SELECTOR PLAN 1
TAVR evaluation in progress  Cardiac cath, TTE, carotid dopplers, and PFTs completed  Mild elevation in creatinine post cath, will defer CT until tomorrow. Scheduled for 1pm pending renal evaluation in am. Coordinated with .  Pt. tentatively scheduled outpt for TAVR on 6/14 with Dr. De La Garza and Dr. Perez    [id number]

## 2018-06-04 NOTE — PROGRESS NOTE ADULT - PROBLEM SELECTOR PLAN 1
Severe AS requiring TAVR evaluation, case discussed with cards NP, plan is for outpt TAVR after patient is discharged from hospital  Structural Cards and CT Surg on board.  S/p cardiac cath on Friday   CT coronaries planned for tomorrow  Cardiac monitoring  Given the worsening of renal function, will hold Lasix until CT is done

## 2018-06-04 NOTE — PROGRESS NOTE ADULT - SUBJECTIVE AND OBJECTIVE BOX
SUBJECTIVE:    No acute events overnight, afebrile, hds.  Pt states he has no cp, sob, n/v, abd pn.  (Sign irisBanner  present for translation)    VITAL SIGNS:    Vital Signs Last 24 Hrs  T(C): 36.7 (04 Jun 2018 04:24), Max: 36.9 (03 Jun 2018 12:41)  T(F): 98.1 (04 Jun 2018 04:24), Max: 98.4 (03 Jun 2018 12:41)  HR: 80 (04 Jun 2018 04:24) (80 - 103)  BP: 100/64 (04 Jun 2018 04:24) (100/64 - 145/80)  BP(mean): --  RR: 20 (04 Jun 2018 04:24) (18 - 20)  SpO2: 91% (04 Jun 2018 04:24) (91% - 97%)    PHYSICAL EXAM:     GENERAL: no acute distress  HEENT: NC/AT, EOMI, neck supple, MMM  RESPIRATORY: LCTAB/L, no rhonchi, rales, or wheezing  CARDIOVASCULAR: RRR, + systolic murmur  ABDOMINAL: soft, non-tender, non-distended, positive bowel sounds   EXTREMITIES: no clubbing, cyanosis, or edema                          12.7   7.72  )-----------( 196      ( 04 Jun 2018 09:17 )             40.0     06-04    138  |  98  |  39<H>  ----------------------------<  89  4.0   |  29  |  1.94<H>    Ca    8.9      04 Jun 2018 06:42  Phos  3.2     06-04  Mg     2.5     06-04    TPro  6.7  /  Alb  3.3  /  TBili  1.2  /  DBili  x   /  AST  16  /  ALT  6<L>  /  AlkPhos  100  06-04      CAPILLARY BLOOD GLUCOSE          MEDICATIONS  (STANDING):  docusate sodium 100 milliGRAM(s) Oral daily  heparin  Injectable 5000 Unit(s) SubCutaneous every 8 hours  senna 2 Tablet(s) Oral at bedtime CC: TAVR evaluation    SUBJECTIVE:    No acute events overnight, afebrile, hds.  Pt states he has no cp, sob, n/v, abd pn.  ([facility]  present for translation)    VITAL SIGNS:    Vital Signs Last 24 Hrs  T(C): 36.7 (04 Jun 2018 04:24), Max: 36.9 (03 Jun 2018 12:41)  T(F): 98.1 (04 Jun 2018 04:24), Max: 98.4 (03 Jun 2018 12:41)  HR: 80 (04 Jun 2018 04:24) (80 - 103)  BP: 100/64 (04 Jun 2018 04:24) (100/64 - 145/80)  BP(mean): --  RR: 20 (04 Jun 2018 04:24) (18 - 20)  SpO2: 91% (04 Jun 2018 04:24) (91% - 97%)    PHYSICAL EXAM:     GENERAL: no acute distress  HEENT: NC/AT, EOMI, neck supple, MMM  RESPIRATORY: LCTAB/L, no rhonchi, rales, or wheezing  CARDIOVASCULAR: RRR, + systolic murmur  ABDOMINAL: soft, non-tender, non-distended, positive bowel sounds   EXTREMITIES: no clubbing, cyanosis, or edema                          12.7   7.72  )-----------( 196      ( 04 Jun 2018 09:17 )             40.0     06-04    138  |  98  |  39<H>  ----------------------------<  89  4.0   |  29  |  1.94<H>    Ca    8.9      04 Jun 2018 06:42  Phos  3.2     06-04  Mg     2.5     06-04    TPro  6.7  /  Alb  3.3  /  TBili  1.2  /  DBili  x   /  AST  16  /  ALT  6<L>  /  AlkPhos  100  06-04      CAPILLARY BLOOD GLUCOSE          MEDICATIONS  (STANDING):  docusate sodium 100 milliGRAM(s) Oral daily  heparin  Injectable 5000 Unit(s) SubCutaneous every 8 hours  senna 2 Tablet(s) Oral at bedtime

## 2018-06-04 NOTE — PROGRESS NOTE ADULT - SUBJECTIVE AND OBJECTIVE BOX
Subjective: Patient seen and examined. No new events except as noted.     SUBJECTIVE/ROS:  No chest pain, dyspnea, palpitation, or dizziness.       MEDICATIONS:  MEDICATIONS  (STANDING):  docusate sodium 100 milliGRAM(s) Oral daily  heparin  Injectable 5000 Unit(s) SubCutaneous every 8 hours  senna 2 Tablet(s) Oral at bedtime      PHYSICAL EXAM:  T(C): 36.7 (06-04-18 @ 04:24), Max: 36.9 (06-03-18 @ 12:41)  HR: 80 (06-04-18 @ 04:24) (80 - 103)  BP: 100/64 (06-04-18 @ 04:24) (100/64 - 145/80)  RR: 20 (06-04-18 @ 04:24) (18 - 20)  SpO2: 91% (06-04-18 @ 04:24) (91% - 97%)  Wt(kg): --  I&O's Summary    03 Jun 2018 07:01  -  04 Jun 2018 07:00  --------------------------------------------------------  IN: 477 mL / OUT: 725 mL / NET: -248 mL      JVP: Normal  Neck: supple  Lung: clear   CV: S1 S2 , Murmur:  Abd: soft  Ext: No edema  neuro: Awake / alert  Psych: flat affect  Skin: normal     LABS/DATA:    CARDIAC MARKERS:                                13.0   8.30  )-----------( 187      ( 03 Jun 2018 08:41 )             41.2     06-04    138  |  98  |  39<H>  ----------------------------<  89  4.0   |  29  |  1.94<H>    Ca    8.9      04 Jun 2018 06:42  Phos  3.2     06-04  Mg     2.5     06-04    TPro  6.7  /  Alb  3.3  /  TBili  1.2  /  DBili  x   /  AST  16  /  ALT  6<L>  /  AlkPhos  100  06-04    proBNP:   Lipid Profile:   HgA1c:   TSH:     TELE:  EKG:

## 2018-06-04 NOTE — PROGRESS NOTE ADULT - SUBJECTIVE AND OBJECTIVE BOX
HPI/Interval Hx    Pt. seen this morning 9am, with  present.  Without complaints, no acute events. TAVR evaluation in progress, awaiting cardiac CT.    MEDICATIONS  (STANDING):  docusate sodium 100 milliGRAM(s) Oral daily  heparin  Injectable 5000 Unit(s) SubCutaneous every 8 hours  senna 2 Tablet(s) Oral at bedtime    MEDICATIONS  (PRN):      PAST MEDICAL & SURGICAL HISTORY:  Aortic stenosis  [place]  Deaf  No significant past surgical history      Review of Systems  CONSTITUTIONAL: No weakness, fevers or chills  EYES/ENT: No visual changes;  No vertigo or throat pain   NECK: No pain or stiffness  RESPIRATORY: No cough, wheezing, hemoptysis; No shortness of breath  CARDIOVASCULAR: No chest pain or palpitations  GASTROINTESTINAL: No abdominal or epigastric pain. No nausea, vomiting, or hematemesis; No diarrhea or constipation. No melena or hematochezia.  GENITOURINARY: No dysuria, frequency or hematuria  NEUROLOGICAL: No numbness or weakness  SKIN: No itching, burning, rashes, or lesions   All other review of systems is negative unless indicated above.    Physical Exam  General: A/ox 3, No acute Distress  Neck: Supple, NO JVD  Cardiac: S1 S2, No M/R/G  Pulmonary: CTAB, Breathing unlabored, No Rhonchi/Rales/Wheezing  Abdomen: Soft, Non -tender, +BS x 4 quads  Extremities: No Rashes, No edema  Neuro: A/o x 3, No focal deficits  Vital Signs Last 24 Hrs  T(C): 36.8 (04 Jun 2018 13:17), Max: 36.9 (03 Jun 2018 20:50)  T(F): 98.2 (04 Jun 2018 13:17), Max: 98.4 (03 Jun 2018 20:50)  HR: 92 (04 Jun 2018 13:17) (80 - 92)  BP: 96/67 (04 Jun 2018 13:17) (96/67 - 145/80)  BP(mean): --  RR: 19 (04 Jun 2018 13:17) (18 - 20)  SpO2: 93% (04 Jun 2018 13:17) (91% - 94%)                        12.7   7.72  )-----------( 196      ( 04 Jun 2018 09:17 )             40.0     06-04    138  |  98  |  39<H>  ----------------------------<  89  4.0   |  29  |  1.94<H>    Ca    8.9      04 Jun 2018 06:42  Phos  3.2     06-04  Mg     2.5     06-04    TPro  6.7  /  Alb  3.3  /  TBili  1.2  /  DBili  x   /  AST  16  /  ALT  6<L>  /  AlkPhos  100  06-04      Other  < from: Transthoracic Echocardiogram (05.23.18 @ 10:37) >  PROCEDURE: Transthoracic echocardiogram with 2-D, M-Mode  and complete spectral and color flow Doppler.  INDICATION: Heart failure, unspecified (I50.9)  ------------------------------------------------------------------------  DIMENSIONS:  Dimensions:     Normal Values:  LA:     5.1 cm    2.0 - 4.0 cm  Ao:     2.9 cm    2.0 - 3.8 cm  SEPTUM: 0.9 cm    0.6 - 1.2 cm  PWT:    0.9 cm    0.6 - 1.1 cm  LVIDd:  5.5 cm    3.0 - 5.6 cm  LVIDs:  4.9 cm    1.8 - 4.0 cm  Derived Variables:  LVMI: 92 g/m2  RWT: 0.32  Fractional short: 11 %  Ejection Fraction (Teicholtz): 23 %  ------------------------------------------------------------------------  OBSERVATIONS:  Mitral Valve: Mitral annular calcification, tethered mitral  valve. Mild mitral regurgitation.  Aortic Root: Normal aortic root.  Aortic Valve: Calcified trileaflet aortic valve with  decreased opening. Peak transaortic valve gradient equals  106 mm Hg, mean transaortic valve gradient equals 53 mm Hg,  estimated aortic valve area equals 0.6 sqcm (by continuity  equation), consistent with severe aortic stenosis. Mild  aortic regurgitation.  LVOT velocity time integralequals  17 cm.  Left Atrium: Normal left atrium.  LA volume index = 33  cc/m2.  Left Ventricle: Severe global left ventricular systolic  dysfunction.  Right Heart: Normal right atrium. Right ventricular  enlargement with decreased right ventricular systolic  function. Normal tricuspid valve. Minimal tricuspid  regurgitation. Normal pulmonic valve. Minimal pulmonic  regurgitation.  Pericardium/PleuraNormal pericardium with no pericardial  effusion.  ------------------------------------------------------------------------    < end of copied text >    < from: Cardiac Cath Lab - Adult (06.01.18 @ 12:02) >  INDICATIONS: Unstable angina - CCS3. Aortic valve stenosis.  HISTORY: Aortic valve: history of severe stenosis. The patient has  hypertension, medication-treated dyslipidemia, and a family history of  coronary artery disease.  PROCEDURE:  --  Right heart catheterization.  --  Left coronary angiography.  --  Right coronary angiography.  --  Sonosite - Diagnostic.  TECHNIQUE: The risks andalternatives of the procedures and conscious  sedation were explained to the patient and informed consent was obtained.  Cardiac catheterization performed electively.  Local anesthetic given. Right radial artery access. Local anesthetic given.  A 6FRPRELUDE KIT was inserted in the vessel, utilizing the modified  Seldinger technique. Right brachial vein access. Local anesthetic given. A  6FR PRELUDE KIT was inserted in the vessel, utilizing the modified  Seldinger technique. Right heart catheterization. The procedure was  performed utilizing a 5fr Arrow [place] catheter under fluoroscopic guidance.  Measurements of pressures, arterial and venous oxygen saturation, and  cardiac output (by Aram using assumed VO2) were obtained. The catheter was  removed. Left coronary artery angiography. The vessel was injected  utilizing a 5FR FL3.5 EXPO catheter and positioned in the vessel ostia  under fluoroscopic guidance. Angiography was performed in multiple  projections using hand-injection of contrast. Right coronary artery  angiography. The vessel was injected utilizing a 4FR JR4.0 catheter and  positioned in the vessel ostia under fluoroscopic guidance. Angiography  was performed in multiple projections using hand-injection of contrast.  Sonosite - Diagnostic. RADIATION EXPOSURE: 13.1 min.  CONTRAST GIVEN: Omnipaque 51 ml.  MEDICATIONS GIVEN: Fentanyl, 25 mcg, IV. Heparin, 3000 units, IV. Cardene,  500 mcg.  HEMODYNAMICS: Hemodynamic assessment demonstrates normal cardiac output.  VENTRICLES: No left ventriculogram was performed.  CORONARY VESSELS: The coronary circulation is co-dominant.  LM:   --  LM: Normal.  LAD:   --  LAD: Normal.  CX:   --  Circumflex: Normal.  RCA:   --  RCA: Normal.  COMPLICATIONS: There were no complications.  DIAGNOSTIC RECOMMENDATIONS: c/w TAVR eval Medical management is  recommended.  INTERVENTIONAL RECOMMENDATIONS: c/w TAVR eval  Prepared and signed by  Linda Cedeño M.D.    < end of copied text >

## 2018-06-04 NOTE — PROGRESS NOTE ADULT - PROBLEM SELECTOR PLAN 3
Baseline Cr 1.5-1.7, creatinine near baseline   Avoid nephrotoxic agents, renally dose medications.  Patient requires contrast studies as part of TAVR w/up   No IVF due to CHF   Monitor renal function   Given the worsening of renal function, will hold Lasix until CT is done, and then resume if renal ok to do so  Renal following

## 2018-06-04 NOTE — PROGRESS NOTE ADULT - PROBLEM SELECTOR PLAN 5
DVT prophylaxis on heparin SC   Dispo: pending CT coronaries tomorrow and then dc home with outpt followup for outpt TAVR

## 2018-06-04 NOTE — PROGRESS NOTE ADULT - SUBJECTIVE AND OBJECTIVE BOX
[facility] [phone]        Patient seen and examined in bed.  He was not SOB this am.          MEDICATIONS  (STANDING):  docusate sodium 100 milliGRAM(s) Oral daily  heparin  Injectable 5000 Unit(s) SubCutaneous every 8 hours  senna 2 Tablet(s) Oral at bedtime      VITAL:  T(C): , Max: 36.9 (18 @ 12:41)  T(F): , Max: 98.4 (18 @ 12:41)  HR: 80 (18 @ 04:24)  BP: 100/64 (18 @ 04:24)  BP(mean): --  RR: 20 (18 @ 04:24)  SpO2: 91% (18 @ 04:24)  Wt(kg): --    I and O's:     @ 07:01  -   @ 07:00  --------------------------------------------------------  IN: 477 mL / OUT: 725 mL / NET: -248 mL     @ 07:01  -   @ 08:41  --------------------------------------------------------  IN: 0 mL / OUT: 200 mL / NET: -200 mL          PHYSICAL EXAM:    Constitutional: NAD  HEENT: PERRLA    Neck:  No JVD  Respiratory: CTAB/L  Cardiovascular: S1 and S2  Gastrointestinal: BS+, soft, NT/ND  Extremities: No peripheral edema  Neurological: A/O x 3, no focal deficits  Psychiatric: Normal mood, normal affect  : No Gonzáles  Skin: No rashes  Access: Not applicable    LABS:                        13.0   8.30  )-----------( 187      ( 2018 08:41 )             41.2     06-04    138  |  98  |  39<H>  ----------------------------<  89  4.0   |  29  |  1.94<H>    Ca    8.9      2018 06:42  Phos  3.2     06-04  Mg     2.5     06-04    TPro  6.7  /  Alb  3.3  /  TBili  1.2  /  DBili  x   /  AST  16  /  ALT  6<L>  /  AlkPhos  100  06-04          Urine Studies:  Urinalysis Basic - ( 2018 06:37 )    Color: Yellow / Appearance: Clear / S.025 / pH: x  Gluc: x / Ketone: Negative  / Bili: Negative / Urobili: Negative   Blood: x / Protein: Trace / Nitrite: Negative   Leuk Esterase: Moderate / RBC: 3-5 /HPF / WBC 11-25 /HPF   Sq Epi: x / Non Sq Epi: x / Bacteria: x            RADIOLOGY & ADDITIONAL STUDIES:

## 2018-06-05 DIAGNOSIS — I35.0 NONRHEUMATIC AORTIC (VALVE) STENOSIS: ICD-10-CM

## 2018-06-05 LAB
ALBUMIN SERPL ELPH-MCNC: 3 G/DL — LOW (ref 3.3–5)
ALP SERPL-CCNC: 96 U/L — SIGNIFICANT CHANGE UP (ref 40–120)
ALT FLD-CCNC: 8 U/L — LOW (ref 10–45)
ANION GAP SERPL CALC-SCNC: 11 MMOL/L — SIGNIFICANT CHANGE UP (ref 5–17)
AST SERPL-CCNC: 11 U/L — SIGNIFICANT CHANGE UP (ref 10–40)
BILIRUB SERPL-MCNC: 0.7 MG/DL — SIGNIFICANT CHANGE UP (ref 0.2–1.2)
BUN SERPL-MCNC: 41 MG/DL — HIGH (ref 7–23)
CALCIUM SERPL-MCNC: 9 MG/DL — SIGNIFICANT CHANGE UP (ref 8.4–10.5)
CHLORIDE SERPL-SCNC: 100 MMOL/L — SIGNIFICANT CHANGE UP (ref 96–108)
CO2 SERPL-SCNC: 28 MMOL/L — SIGNIFICANT CHANGE UP (ref 22–31)
CREAT SERPL-MCNC: 1.82 MG/DL — HIGH (ref 0.5–1.3)
GLUCOSE SERPL-MCNC: 88 MG/DL — SIGNIFICANT CHANGE UP (ref 70–99)
HCT VFR BLD CALC: 38.3 % — LOW (ref 39–50)
HGB BLD-MCNC: 12 G/DL — LOW (ref 13–17)
MCHC RBC-ENTMCNC: 31.3 GM/DL — LOW (ref 32–36)
MCHC RBC-ENTMCNC: 32 PG — SIGNIFICANT CHANGE UP (ref 27–34)
MCV RBC AUTO: 102.1 FL — HIGH (ref 80–100)
PLATELET # BLD AUTO: 183 K/UL — SIGNIFICANT CHANGE UP (ref 150–400)
POTASSIUM SERPL-MCNC: 4 MMOL/L — SIGNIFICANT CHANGE UP (ref 3.5–5.3)
POTASSIUM SERPL-SCNC: 4 MMOL/L — SIGNIFICANT CHANGE UP (ref 3.5–5.3)
PROT SERPL-MCNC: 6.5 G/DL — SIGNIFICANT CHANGE UP (ref 6–8.3)
RBC # BLD: 3.75 M/UL — LOW (ref 4.2–5.8)
RBC # FLD: 13.2 % — SIGNIFICANT CHANGE UP (ref 10.3–14.5)
SODIUM SERPL-SCNC: 139 MMOL/L — SIGNIFICANT CHANGE UP (ref 135–145)
WBC # BLD: 7.28 K/UL — SIGNIFICANT CHANGE UP (ref 3.8–10.5)
WBC # FLD AUTO: 7.28 K/UL — SIGNIFICANT CHANGE UP (ref 3.8–10.5)

## 2018-06-05 PROCEDURE — 75572 CT HRT W/3D IMAGE: CPT | Mod: 26

## 2018-06-05 PROCEDURE — 99233 SBSQ HOSP IP/OBS HIGH 50: CPT

## 2018-06-05 RX ORDER — ACETAMINOPHEN 500 MG
650 TABLET ORAL ONCE
Qty: 0 | Refills: 0 | Status: COMPLETED | OUTPATIENT
Start: 2018-06-05 | End: 2018-06-05

## 2018-06-05 RX ADMIN — HEPARIN SODIUM 5000 UNIT(S): 5000 INJECTION INTRAVENOUS; SUBCUTANEOUS at 05:34

## 2018-06-05 RX ADMIN — HEPARIN SODIUM 5000 UNIT(S): 5000 INJECTION INTRAVENOUS; SUBCUTANEOUS at 20:33

## 2018-06-05 RX ADMIN — Medication 650 MILLIGRAM(S): at 21:48

## 2018-06-05 RX ADMIN — HEPARIN SODIUM 5000 UNIT(S): 5000 INJECTION INTRAVENOUS; SUBCUTANEOUS at 13:08

## 2018-06-05 RX ADMIN — Medication 40 MILLIGRAM(S): at 05:34

## 2018-06-05 RX ADMIN — Medication 100 MILLIGRAM(S): at 11:55

## 2018-06-05 RX ADMIN — SENNA PLUS 2 TABLET(S): 8.6 TABLET ORAL at 20:33

## 2018-06-05 NOTE — PROGRESS NOTE ADULT - PROBLEM SELECTOR PROBLEM 1
Chronic systolic heart failure
Nonrheumatic aortic valve stenosis
Aortic stenosis

## 2018-06-05 NOTE — PROGRESS NOTE ADULT - PROBLEM SELECTOR PLAN 2
Pt does not appear to be in acute CHF exacerbation at this time.  Beta blocker discontinued 2/2 bradycardia.  Strict I/Os.  Daily weights.  Monitor lytes   Patient declined LifeVest.  -restart lasix today.

## 2018-06-05 NOTE — PROGRESS NOTE ADULT - SUBJECTIVE AND OBJECTIVE BOX
HPI/Interval Hx    Pt. seen this morning, resting in bed, without complaints. TAVR evaluation in progress.    MEDICATIONS  (STANDING):  docusate sodium 100 milliGRAM(s) Oral daily  furosemide    Tablet 40 milliGRAM(s) Oral daily  heparin  Injectable 5000 Unit(s) SubCutaneous every 8 hours  senna 2 Tablet(s) Oral at bedtime    MEDICATIONS  (PRN):      PAST MEDICAL & SURGICAL HISTORY:  Aortic stenosis  [place]  Deaf  No significant past surgical history      Review of Systems  CONSTITUTIONAL: No weakness, fevers or chills  EYES/ENT: No visual changes;  No vertigo or throat pain   NECK: No pain or stiffness  RESPIRATORY: No cough, wheezing, hemoptysis; No shortness of breath  CARDIOVASCULAR: No chest pain or palpitations  GASTROINTESTINAL: No abdominal or epigastric pain. No nausea, vomiting, or hematemesis; No diarrhea or constipation. No melena or hematochezia.  GENITOURINARY: No dysuria, frequency or hematuria  NEUROLOGICAL: No numbness or weakness  SKIN: No itching, burning, rashes, or lesions   All other review of systems is negative unless indicated above.    Physical Exam  General: A/ox 3, No acute Distress  Neck: Supple, NO JVD  Cardiac: S1 S2, III/VI systolic murmur  Pulmonary: CTAB, Breathing unlabored, No Rhonchi/Rales/Wheezing  Abdomen: Soft, Non -tender, +BS x 4 quads  Extremities: No Rashes, No edema  Neuro: A/o x 3, No focal deficits    Vital Signs Last 24 Hrs  T(C): 37.1 (05 Jun 2018 12:48), Max: 37.3 (04 Jun 2018 20:34)  T(F): 98.7 (05 Jun 2018 12:48), Max: 99.1 (04 Jun 2018 20:34)  HR: 84 (05 Jun 2018 12:48) (84 - 94)  BP: 95/56 (05 Jun 2018 12:48) (95/56 - 118/72)  BP(mean): --  RR: 17 (05 Jun 2018 12:48) (16 - 18)  SpO2: 95% (05 Jun 2018 12:48) (93% - 95%)                        12.0   7.28  )-----------( 183      ( 05 Jun 2018 07:22 )             38.3     06-05    139  |  100  |  41<H>  ----------------------------<  88  4.0   |  28  |  1.82<H>    Ca    9.0      05 Jun 2018 05:43  Phos  3.2     06-04  Mg     2.5     06-04    TPro  6.5  /  Alb  3.0<L>  /  TBili  0.7  /  DBili  x   /  AST  11  /  ALT  8<L>  /  AlkPhos  96  06-05    Other  < from: Transthoracic Echocardiogram (05.23.18 @ 10:37) >  PROCEDURE: Transthoracic echocardiogram with 2-D, M-Mode  and complete spectral and color flow Doppler.  INDICATION: Heart failure, unspecified (I50.9)  ------------------------------------------------------------------------  DIMENSIONS:  Dimensions:     Normal Values:  LA:     5.1 cm    2.0 - 4.0 cm  Ao:     2.9 cm    2.0 - 3.8 cm  SEPTUM: 0.9 cm    0.6 - 1.2 cm  PWT:    0.9 cm    0.6 - 1.1 cm  LVIDd:  5.5 cm    3.0 - 5.6 cm  LVIDs:  4.9 cm    1.8 - 4.0 cm  Derived Variables:  LVMI: 92 g/m2  RWT: 0.32  Fractional short: 11 %  Ejection Fraction (Teicholtz): 23 %  ------------------------------------------------------------------------  OBSERVATIONS:  Mitral Valve: Mitral annular calcification, tethered mitral  valve. Mild mitral regurgitation.  Aortic Root: Normal aortic root.  Aortic Valve: Calcified trileaflet aortic valve with  decreased opening. Peak transaortic valve gradient equals  106 mm Hg, mean transaortic valve gradient equals 53 mm Hg,  estimated aortic valve area equals 0.6 sqcm (by continuity  equation), consistent with severe aortic stenosis. Mild  aortic regurgitation.  LVOT velocity time integralequals  17 cm.  Left Atrium: Normal left atrium.  LA volume index = 33  cc/m2.  Left Ventricle: Severe global left ventricular systolic  dysfunction.  Right Heart: Normal right atrium. Right ventricular  enlargement with decreased right ventricular systolic  function. Normal tricuspid valve. Minimal tricuspid  regurgitation. Normal pulmonic valve. Minimal pulmonic  regurgitation.  Pericardium/PleuraNormal pericardium with no pericardial  effusion.  ------------------------------------------------------------------------    < end of copied text >    < from: Cardiac Cath Lab - Adult (06.01.18 @ 12:02) >  Case Physician(s):  Linda Cedeño M.D.  Referring Physician:  INDICATIONS: Unstable angina - CCS3. Aortic valve stenosis.  HISTORY: Aortic valve: history of severe stenosis. The patient has  hypertension, medication-treated dyslipidemia, and a family history of  coronary artery disease.  PROCEDURE:  --  Right heart catheterization.  --  Left coronary angiography.  --  Right coronary angiography.  --  Sonosite - Diagnostic.  TECHNIQUE: The risks andalternatives of the procedures and conscious  sedation were explained to the patient and informed consent was obtained.  Cardiac catheterization performed electively.  Local anesthetic given. Right radial artery access. Local anesthetic given.  A 6FRPRELUDE KIT was inserted in the vessel, utilizing the modified  Seldinger technique. Right brachial vein access. Local anesthetic given. A  6FR PRELUDE KIT was inserted in the vessel, utilizing the modified  Seldinger technique. Right heart catheterization. The procedure was  performed utilizing a 5fr [place] catheter under fluoroscopic guidance.  Measurements of pressures, arterial and venous oxygen saturation, and  cardiac output (by Aram using assumed VO2) were obtained. The catheter was  removed. Left coronary artery angiography. The vessel was injected  utilizing a 5FR FL3.5 EXPO catheter and positioned in the vessel ostia  under fluoroscopic guidance. Angiography was performed in multiple  projections using hand-injection of contrast. Right coronary artery  angiography. The vessel was injected utilizing a 4FR JR4.0 catheter and  positioned in the vessel ostia under fluoroscopic guidance. Angiography  was performed in multiple projections using hand-injection of contrast.  Sonosite - Diagnostic. RADIATION EXPOSURE: 13.1 min.  CONTRAST GIVEN: Omnipaque 51 ml.  MEDICATIONS GIVEN: Fentanyl, 25 mcg, IV. Heparin, 3000 units, IV. Cardene,  500 mcg.  HEMODYNAMICS: Hemodynamic assessment demonstrates normal cardiac output.  VENTRICLES: No left ventriculogram was performed.  CORONARY VESSELS: The coronary circulation is co-dominant.  LM:   --  LM: Normal.  LAD:   --  LAD: Normal.  CX:   --  Circumflex: Normal.  RCA:   --  RCA: Normal.  COMPLICATIONS: There were no complications.  DIAGNOSTIC RECOMMENDATIONS: c/w TAVR eval Medical management is  recommended.  INTERVENTIONAL RECOMMENDATIONS: c/w TAVR eval  Prepared and signed by  Linda Cedeño M.D.    < end of copied text >

## 2018-06-05 NOTE — PROGRESS NOTE ADULT - SUBJECTIVE AND OBJECTIVE BOX
CC: TAVR evaluation    SUBJECTIVE:    No acute events overnight, afebrile, hds.  Pt states he has no cp, sob, n/v, abd pn.  ([facility])    VITAL SIGNS:    Vital Signs Last 24 Hrs  T(C): 37.1 (05 Jun 2018 12:48), Max: 37.3 (04 Jun 2018 20:34)  T(F): 98.7 (05 Jun 2018 12:48), Max: 99.1 (04 Jun 2018 20:34)  HR: 84 (05 Jun 2018 12:48) (84 - 94)  BP: 95/56 (05 Jun 2018 12:48) (95/56 - 118/72)  BP(mean): --  RR: 17 (05 Jun 2018 12:48) (16 - 18)  SpO2: 95% (05 Jun 2018 12:48) (93% - 95%)    PHYSICAL EXAM:     GENERAL: no acute distress  HEENT: NC/AT, EOMI, neck supple, MMM  RESPIRATORY: LCTAB/L, no rhonchi, rales, or wheezing  CARDIOVASCULAR: RRR, + systolic murmur  ABDOMINAL: soft, non-tender, non-distended, positive bowel sounds   EXTREMITIES: no clubbing, cyanosis, or edema  06-05    139  |  100  |  41<H>  ----------------------------<  88  4.0   |  28  |  1.82<H>    Ca    9.0      05 Jun 2018 05:43  Phos  3.2     06-04  Mg     2.5     06-04        MEDICATIONS  (STANDING):  docusate sodium 100 milliGRAM(s) Oral daily  heparin  Injectable 5000 Unit(s) SubCutaneous every 8 hours  senna 2 Tablet(s) Oral at bedtime

## 2018-06-05 NOTE — PROGRESS NOTE ADULT - SUBJECTIVE AND OBJECTIVE BOX
Subjective: Patient seen and examined. No new events except as noted.     SUBJECTIVE/ROS:  No chest pain, dyspnea, palpitation, or dizziness.       MEDICATIONS:  MEDICATIONS  (STANDING):  docusate sodium 100 milliGRAM(s) Oral daily  furosemide    Tablet 40 milliGRAM(s) Oral daily  heparin  Injectable 5000 Unit(s) SubCutaneous every 8 hours  senna 2 Tablet(s) Oral at bedtime      PHYSICAL EXAM:  T(C): 37.1 (06-05-18 @ 12:48), Max: 37.3 (06-04-18 @ 20:34)  HR: 84 (06-05-18 @ 12:48) (84 - 94)  BP: 95/56 (06-05-18 @ 12:48) (95/56 - 118/72)  RR: 17 (06-05-18 @ 12:48) (16 - 18)  SpO2: 95% (06-05-18 @ 12:48) (93% - 95%)  Wt(kg): --  I&O's Summary    04 Jun 2018 07:01  -  05 Jun 2018 07:00  --------------------------------------------------------  IN: 540 mL / OUT: 650 mL / NET: -110 mL    05 Jun 2018 07:01  -  05 Jun 2018 16:34  --------------------------------------------------------  IN: 480 mL / OUT: 340 mL / NET: 140 mL        JVP: Normal  Neck: supple  Lung: clear   CV: S1 S2 , Murmur:  Abd: soft  Ext: No edema  neuro: Awake / alert  Psych: flat affect  Skin: normal       LABS/DATA:    CARDIAC MARKERS:                                12.0   7.28  )-----------( 183      ( 05 Jun 2018 07:22 )             38.3     06-05    139  |  100  |  41<H>  ----------------------------<  88  4.0   |  28  |  1.82<H>    Ca    9.0      05 Jun 2018 05:43  Phos  3.2     06-04  Mg     2.5     06-04    TPro  6.5  /  Alb  3.0<L>  /  TBili  0.7  /  DBili  x   /  AST  11  /  ALT  8<L>  /  AlkPhos  96  06-05    proBNP:   Lipid Profile:   HgA1c:   TSH:     TELE:  EKG:

## 2018-06-05 NOTE — PROGRESS NOTE ADULT - ATTENDING COMMENTS
I  dw the pt his plan through a "sign language" 
I also dw the pt his plan through a "sign language" 
Dispo: monitor 24 hours s/p CT coronaries to monitor Creatinine.  IF stable, D/C planning tomorrow.
The above recommendations and possible side effects of medications were discussed with the patient. The patient had all questions answered and expressed understanding of the plan.  Physician: Cade More [phone]
The above recommendations and possible side effects of medications were discussed with the patient. The patient had all questions answered and expressed understanding of the plan.  Physician: Cade More [phone]

## 2018-06-05 NOTE — PROGRESS NOTE ADULT - PROBLEM SELECTOR PLAN 1
Severe AS requiring TAVR evaluation, case discussed with cards NP, plan is for outpt TAVR after patient is discharged from hospital  Structural Cards and CT Surg on board.  S/p cardiac cath on Friday   CT coronaries 1PM today.  Cardiac monitoring  -pending CT coronaries, D/C for outpatient TAVR on 6/14

## 2018-06-05 NOTE — PROGRESS NOTE ADULT - SUBJECTIVE AND OBJECTIVE BOX
NEPHROLOGY-[facility] [phone]        Patient seen and examined in bed.  He felt well and offered no complaints        MEDICATIONS  (STANDING):  docusate sodium 100 milliGRAM(s) Oral daily  furosemide    Tablet 40 milliGRAM(s) Oral daily  heparin  Injectable 5000 Unit(s) SubCutaneous every 8 hours  senna 2 Tablet(s) Oral at bedtime      VITAL:  T(C): , Max: 37.3 (06-04-18 @ 20:34)  T(F): , Max: 99.1 (06-04-18 @ 20:34)  HR: 84 (06-05-18 @ 05:15)  BP: 108/71 (06-05-18 @ 05:15)  BP(mean): --  RR: 16 (06-05-18 @ 05:15)  SpO2: 93% (06-05-18 @ 05:15)  Wt(kg): --    I and O's:    06-04 @ 07:01  -  06-05 @ 07:00  --------------------------------------------------------  IN: 540 mL / OUT: 650 mL / NET: -110 mL          PHYSICAL EXAM:    Constitutional: NAD  HEENT: PERRLA    Neck:  No JVD  Respiratory: CTAB/L  Cardiovascular: S1 and S2  3/6  Gastrointestinal: BS+, soft, NT/ND  Extremities: No peripheral edema  Neurological: A/O x 3, no focal deficits  Psychiatric: Normal mood, normal affect  : No Gonzáles  Skin: No rashes  Access: Not applicable    LABS:                        12.7   7.72  )-----------( 196      ( 04 Jun 2018 09:17 )             40.0     06-05    139  |  100  |  41<H>  ----------------------------<  88  4.0   |  28  |  1.82<H>    Ca    9.0      05 Jun 2018 05:43  Phos  3.2     06-04  Mg     2.5     06-04    TPro  6.5  /  Alb  3.0<L>  /  TBili  0.7  /  DBili  x   /  AST  11  /  ALT  8<L>  /  AlkPhos  96  06-05          Urine Studies:          RADIOLOGY & ADDITIONAL STUDIES:

## 2018-06-05 NOTE — PROGRESS NOTE ADULT - PROBLEM SELECTOR PLAN 1
TAVR evaluation complete  CT imaging reviewed, anatomy suitable for transfemoral TAVR.  Scheduled as outpt for 6/14 with Dr. De La Garza and Dr. Perez,  notified  DC planning per primary team pending [facility] f/u in AM.    [id number]

## 2018-06-05 NOTE — PROGRESS NOTE ADULT - PROBLEM SELECTOR PLAN 3
Baseline Cr 1.5-1.7, creatinine near baseline   Avoid nephrotoxic agents, renally dose medications.  Patient requires contrast studies as part of TAVR w/up   No IVF due to CHF   Monitor renal function   Renal following

## 2018-06-06 ENCOUNTER — TRANSCRIPTION ENCOUNTER (OUTPATIENT)
Age: 83
End: 2018-06-06

## 2018-06-06 VITALS — WEIGHT: 201.94 LBS

## 2018-06-06 LAB
ANION GAP SERPL CALC-SCNC: 13 MMOL/L — SIGNIFICANT CHANGE UP (ref 5–17)
BUN SERPL-MCNC: 35 MG/DL — HIGH (ref 7–23)
CALCIUM SERPL-MCNC: 8.8 MG/DL — SIGNIFICANT CHANGE UP (ref 8.4–10.5)
CHLORIDE SERPL-SCNC: 99 MMOL/L — SIGNIFICANT CHANGE UP (ref 96–108)
CO2 SERPL-SCNC: 26 MMOL/L — SIGNIFICANT CHANGE UP (ref 22–31)
CREAT SERPL-MCNC: 1.74 MG/DL — HIGH (ref 0.5–1.3)
GLUCOSE SERPL-MCNC: 87 MG/DL — SIGNIFICANT CHANGE UP (ref 70–99)
POTASSIUM SERPL-MCNC: 3.9 MMOL/L — SIGNIFICANT CHANGE UP (ref 3.5–5.3)
POTASSIUM SERPL-SCNC: 3.9 MMOL/L — SIGNIFICANT CHANGE UP (ref 3.5–5.3)
SODIUM SERPL-SCNC: 138 MMOL/L — SIGNIFICANT CHANGE UP (ref 135–145)

## 2018-06-06 PROCEDURE — C1894: CPT

## 2018-06-06 PROCEDURE — 80048 BASIC METABOLIC PNL TOTAL CA: CPT

## 2018-06-06 PROCEDURE — C1887: CPT

## 2018-06-06 PROCEDURE — 85027 COMPLETE CBC AUTOMATED: CPT

## 2018-06-06 PROCEDURE — 84100 ASSAY OF PHOSPHORUS: CPT

## 2018-06-06 PROCEDURE — 97162 PT EVAL MOD COMPLEX 30 MIN: CPT

## 2018-06-06 PROCEDURE — 99153 MOD SED SAME PHYS/QHP EA: CPT

## 2018-06-06 PROCEDURE — 99239 HOSP IP/OBS DSCHRG MGMT >30: CPT

## 2018-06-06 PROCEDURE — 80053 COMPREHEN METABOLIC PANEL: CPT

## 2018-06-06 PROCEDURE — 93456 R HRT CORONARY ARTERY ANGIO: CPT

## 2018-06-06 PROCEDURE — 85730 THROMBOPLASTIN TIME PARTIAL: CPT

## 2018-06-06 PROCEDURE — 94010 BREATHING CAPACITY TEST: CPT

## 2018-06-06 PROCEDURE — C1817: CPT

## 2018-06-06 PROCEDURE — 75572 CT HRT W/3D IMAGE: CPT

## 2018-06-06 PROCEDURE — C1769: CPT

## 2018-06-06 PROCEDURE — 99152 MOD SED SAME PHYS/QHP 5/>YRS: CPT

## 2018-06-06 PROCEDURE — 93880 EXTRACRANIAL BILAT STUDY: CPT

## 2018-06-06 PROCEDURE — 81001 URINALYSIS AUTO W/SCOPE: CPT

## 2018-06-06 PROCEDURE — 85610 PROTHROMBIN TIME: CPT

## 2018-06-06 PROCEDURE — 83735 ASSAY OF MAGNESIUM: CPT

## 2018-06-06 PROCEDURE — 76937 US GUIDE VASCULAR ACCESS: CPT

## 2018-06-06 RX ORDER — FUROSEMIDE 40 MG
1 TABLET ORAL
Qty: 14 | Refills: 0 | OUTPATIENT
Start: 2018-06-06 | End: 2018-06-19

## 2018-06-06 RX ORDER — FUROSEMIDE 40 MG
1 TABLET ORAL
Qty: 0 | Refills: 0 | COMMUNITY
Start: 2018-06-06

## 2018-06-06 RX ADMIN — HEPARIN SODIUM 5000 UNIT(S): 5000 INJECTION INTRAVENOUS; SUBCUTANEOUS at 05:41

## 2018-06-06 RX ADMIN — Medication 100 MILLIGRAM(S): at 11:52

## 2018-06-06 RX ADMIN — Medication 40 MILLIGRAM(S): at 05:41

## 2018-06-06 NOTE — DISCHARGE NOTE ADULT - CARE PROVIDERS DIRECT ADDRESSES
[email or web address],DirectAddress_Unknown,DirectAddress_Unknown

## 2018-06-06 NOTE — DISCHARGE NOTE ADULT - MEDICATION SUMMARY - MEDICATIONS TO TAKE
I will START or STAY ON the medications listed below when I get home from the hospital:    furosemide 40 mg oral tablet  -- 1 tab(s) by mouth once a day  -- Indication: For water plill    docusate sodium 100 mg oral capsule  -- 1 cap(s) by mouth once a day  -- Indication: For stool softener    senna oral tablet  -- 2 tab(s) by mouth once a day (at bedtime)  -- Indication: For Constipation I will START or STAY ON the medications listed below when I get home from the hospital:    furosemide 40 mg oral tablet  -- 1 tab(s) by mouth once a day  -- Indication: For water pill    docusate sodium 100 mg oral capsule  -- 1 cap(s) by mouth once a day  -- Indication: For stool softener    senna oral tablet  -- 2 tab(s) by mouth once a day (at bedtime)  -- Indication: For Constipation

## 2018-06-06 NOTE — PROGRESS NOTE ADULT - SUBJECTIVE AND OBJECTIVE BOX
TAVR evaluation complete  Presurgical testing scheduled for Monday 6/11, time to be determined pending availability of .   TAVR scheduled for Thursday 6/14 with Dr. De La Garza and Dr. Perez    Discussed with patient in presence of , spoke to son Rah and Daughter in Law Judi via phone. I will notify them when I have confirmation of PST time.    They can call [phone] with questions/concerns    [phone]

## 2018-06-06 NOTE — DISCHARGE NOTE ADULT - CARE PROVIDER_API CALL
Hamlet De La Garza), Thoracic and Cardiac Surgery  [address] [place] [address]  Phone: [phone]  Fax: [phone] Hamlet De La Garza), Thoracic and Cardiac Surgery  [address] [place] [address]  Phone: [phone]  Fax: [phone]    Dangelo Mohamud), Internal Medicine; Nephrology  [address]  [place] [address]  Phone: [phone]  Fax: [phone]    Jeffery Carreon), Cardiovascular Disease; Internal Medicine  [address] [facility]  [place] [address]  Phone: [phone]  Fax: [phone]

## 2018-06-06 NOTE — PROGRESS NOTE ADULT - ASSESSMENT
87 y/o male with severe AS with reduced EF seen and evaluated for consideration of TAVR.
87 y/o male with severe AS with reduced EF undergoing evaluation for TAVR.
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    CASH likely RACHEL    1.	Renal. Creatinine rising today post cardiac cath but on a downward trajectory; Would not give IV fluids with his current cardiac function.  Restart Lasix in am  2.	Cardiology.  s/p Cardiac catheterization yesterday with limited dye.   CT coronaries delayed till am  3.	CTS.  Eventual TAVR   4.	  Discussed with  NP CTS anatomy team
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    CASH that is improving    1.	Renal. Creatinine is improving.  H eis at moderate risk for RACHEL following the CT of the coronaries.  Through a  this was explained to the patient.  Would not give IV fluids with his current cardiac function.  Back on Lasix   2.	Cardiology.   CT coronaries today at 1 pm  3.	CTS.  Eventual TAVR
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    CASH that is improving    1.	Renal. Creatinine is improving.  No evidence of RACHEL thus far.  No renal objection to d/c home.    Back on Lasix   2.	Cardiology.   Awaiting official results of CT coronaries  3.	CTS.  Eventual TAVR
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
Severe AS  Chronic systoli CHF  CASH    stable  fu with renal eval   cath normal coronaries   awaiting cardiac ct  fu with with TAVR team
Severe AS  Chronic systoli CHF  CASH    stable  fu with renal eval   cath normal coronaries   awaiting cardiac ct  fu with with TAVR team for definitive plan
Severe AS  Chronic systoli CHF  CASH    stable  fu with renal eval   cath normal coronaries   fu with with TAVR team
Severe AS  Chronic systoli CHF  CASH    stable  fu with renal eval   cath normal coronaries   plan for outpt tavr
Severe AS  Chronic systoli CHF  CASH    stable  fu with renal eval   cath normal coronaries   plan for outpt tavr

## 2018-06-06 NOTE — DISCHARGE NOTE ADULT - PLAN OF CARE
Free from fluid overload/ respiratory distress Scheduled for outpatient TAVR with Dr. De La Garza and Dr. Perez,  notified Continue with Furosemide as prescribed Stable Continue with bowel regimen You were transferred from an outside hospital for a TAVR evaluation which was completed.  You are a good candidate for a TAVR.Scheduled for outpatient TAVR with Dr. De La Garza and Dr. Perez on 6/14/18.  you will be contacted by the TAVR team for presurgical testing. Stable, continue with your current medications.  Follow up with your nephrologist and your PMd in 1-2 weeks.

## 2018-06-06 NOTE — DISCHARGE NOTE ADULT - HOSPITAL COURSE
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    Severe AS - seen by Structural Cards and CT Surg for TAVR evaluation  s/p cardiac cath on 6/1  Imaging reviewed by structural card-- anatomy suitable for transfemoral TAVR.  Scheduled as outpt for 6/14 with Dr. De La Garza and Dr. 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    Severe AS - seen by Structural Cards and CT Surg for TAVR evaluation  s/p cardiac cath on 6/1  Imaging reviewed by structural card-- anatomy suitable for transfemoral TAVR.  Scheduled as outpt for 6/14 with Dr. De La Garza and Dr. Perez,  notified  Continue with Furosemide for chronic systolic HF as creatiine is stable and downtrending. No evidence of RACHEL at this time. Cleared by cardiology, nephrology, structural heart for discharge.  to go for TAVR on 6/14/18

## 2018-06-06 NOTE — DISCHARGE NOTE ADULT - SECONDARY DIAGNOSIS.
Chronic systolic heart failure CKD (chronic kidney disease) stage 4, GFR 15-29 ml/min Constipation, unspecified constipation type

## 2018-06-06 NOTE — DISCHARGE NOTE ADULT - PATIENT PORTAL LINK FT
You can access the [facility] Patient Portal, offered by [facility], by registering with the following website: [email or web address]

## 2018-06-06 NOTE — DISCHARGE NOTE ADULT - CARE PLAN
Principal Discharge DX:	Aortic stenosis, severe  Goal:	Free from fluid overload/ respiratory distress  Assessment and plan of treatment:	Scheduled for outpatient TAVR with Dr. De La Garza and Dr. Perez,  notified  Secondary Diagnosis:	Chronic systolic heart failure  Assessment and plan of treatment:	Continue with Furosemide as prescribed  Secondary Diagnosis:	CKD (chronic kidney disease) stage 4, GFR 15-29 ml/min  Assessment and plan of treatment:	Stable  Secondary Diagnosis:	Constipation, unspecified constipation type  Assessment and plan of treatment:	Continue with bowel regimen Principal Discharge DX:	Aortic stenosis, severe  Goal:	Free from fluid overload/ respiratory distress  Assessment and plan of treatment:	You were transferred from an outside hospital for a TAVR evaluation which was completed.  You are a good candidate for a TAVR.Scheduled for outpatient TAVR with Dr. De La Garza and Dr. Perez on 6/14/18.  you will be contacted by the TAVR team for presurgical testing.  Secondary Diagnosis:	Chronic systolic heart failure  Assessment and plan of treatment:	Continue with Furosemide as prescribed  Secondary Diagnosis:	CKD (chronic kidney disease) stage 4, GFR 15-29 ml/min  Assessment and plan of treatment:	Stable, continue with your current medications.  Follow up with your nephrologist and your PMd in 1-2 weeks.  Secondary Diagnosis:	Constipation, unspecified constipation type  Assessment and plan of treatment:	Continue with bowel regimen

## 2018-06-06 NOTE — DISCHARGE NOTE ADULT - ADDITIONAL INSTRUCTIONS
Follow up with your Primary care doctor Follow up with your Primary care doctor  Presurgical testing scheduled for Monday 6/11. Time to be determined pending availability of .   TAVR scheduled for Thursday 6/14 with Dr. De La Garza and Dr. Perez  You may call [phone] with questions/concerns

## 2018-06-06 NOTE — PROGRESS NOTE ADULT - PROVIDER SPECIALTY LIST ADULT
Cardiology
Hospitalist
Internal Medicine
Nephrology
Structural Heart

## 2018-06-06 NOTE — CHART NOTE - NSCHARTNOTEFT_GEN_A_CORE
Patient seen and evaluated today.  Patient offers no ocpmlaints. Discussed iw renal and structural heart and patient's cardiologist, Dr. Carreon.  No objection for discharge from any service.  Patient to have arrangements for pre-surgical testing next week and to come back to hospital on 6/14/18 for TAVR procedure.  D/C planning 50 minutes.  Plan discussed with patient at length through  at bedside.

## 2018-06-06 NOTE — PROGRESS NOTE ADULT - SUBJECTIVE AND OBJECTIVE BOX
Subjective: Patient seen and examined. No new events except as noted.     SUBJECTIVE/ROS:  no new events       MEDICATIONS:  MEDICATIONS  (STANDING):  docusate sodium 100 milliGRAM(s) Oral daily  furosemide    Tablet 40 milliGRAM(s) Oral daily  heparin  Injectable 5000 Unit(s) SubCutaneous every 8 hours  senna 2 Tablet(s) Oral at bedtime      PHYSICAL EXAM:  T(C): 37.1 (06-06-18 @ 04:04), Max: 37.3 (06-05-18 @ 20:34)  HR: 75 (06-06-18 @ 04:04) (75 - 89)  BP: 102/65 (06-06-18 @ 04:04) (99/59 - 102/65)  RR: 20 (06-06-18 @ 04:04) (18 - 20)  SpO2: 96% (06-06-18 @ 04:04) (93% - 96%)  Wt(kg): --  I&O's Summary    05 Jun 2018 07:01  -  06 Jun 2018 07:00  --------------------------------------------------------  IN: 780 mL / OUT: 840 mL / NET: -60 mL          Appearance: Normal	  HEENT:   Normal oral mucosa, PERRL, EOMI	  Cardiovascular: Normal S1 S2,    Murmur:   Neck: JVP normal  Respiratory: Lungs clear to auscultation  Gastrointestinal:  Soft, Non-tender, + BS	  Skin: normal   Neuro: No gross deficits.   Psychiatry:  Mood & affect appropriate  Ext: No edema      LABS/DATA:    CARDIAC MARKERS:                                12.0   7.28  )-----------( 183      ( 05 Jun 2018 07:22 )             38.3     06-06    138  |  99  |  35<H>  ----------------------------<  87  3.9   |  26  |  1.74<H>    Ca    8.8      06 Jun 2018 05:30    TPro  6.5  /  Alb  3.0<L>  /  TBili  0.7  /  DBili  x   /  AST  11  /  ALT  8<L>  /  AlkPhos  96  06-05    proBNP:   Lipid Profile:   HgA1c:   TSH:     TELE:  EKG:

## 2018-06-06 NOTE — PROGRESS NOTE ADULT - SUBJECTIVE AND OBJECTIVE BOX
NEPHROLOGY-[facility] [phone]        Patient seen and examined in bed.  He was in good spirits and offered no complaints        MEDICATIONS  (STANDING):  docusate sodium 100 milliGRAM(s) Oral daily  furosemide    Tablet 40 milliGRAM(s) Oral daily  heparin  Injectable 5000 Unit(s) SubCutaneous every 8 hours  senna 2 Tablet(s) Oral at bedtime      VITAL:  T(C): , Max: 37.3 (06-05-18 @ 20:34)  T(F): , Max: 99.1 (06-05-18 @ 20:34)  HR: 75 (06-06-18 @ 04:04)  BP: 102/65 (06-06-18 @ 04:04)  BP(mean): --  RR: 20 (06-06-18 @ 04:04)  SpO2: 96% (06-06-18 @ 04:04)  Wt(kg): --    I and O's:    06-05 @ 07:01  -  06-06 @ 07:00  --------------------------------------------------------  IN: 780 mL / OUT: 840 mL / NET: -60 mL          PHYSICAL EXAM:    Constitutional: NAD  HEENT: PERRLA    Neck:  No JVD  Respiratory: CTAB/L  Cardiovascular: S1 and S2  3/6 LANCE  Gastrointestinal: BS+, soft, NT/ND  Extremities: No peripheral edema  Neurological: A/O x 3, no focal deficits  Psychiatric: Normal mood, normal affect  : No Gonzáles  Skin: No rashes  Access: Not applicable    LABS:                        12.0   7.28  )-----------( 183      ( 05 Jun 2018 07:22 )             38.3     06-06    138  |  99  |  35<H>  ----------------------------<  87  3.9   |  26  |  1.74<H>    Ca    8.8      06 Jun 2018 05:30    TPro  6.5  /  Alb  3.0<L>  /  TBili  0.7  /  DBili  x   /  AST  11  /  ALT  8<L>  /  AlkPhos  96  06-05          Urine Studies:          RADIOLOGY & ADDITIONAL STUDIES:

## 2018-06-11 ENCOUNTER — OUTPATIENT (OUTPATIENT)
Dept: OUTPATIENT SERVICES | Facility: HOSPITAL | Age: 83
LOS: 1 days | End: 2018-06-11
Payer: MEDICARE

## 2018-06-11 VITALS
WEIGHT: 197.09 LBS | DIASTOLIC BLOOD PRESSURE: 63 MMHG | HEART RATE: 98 BPM | RESPIRATION RATE: 18 BRPM | OXYGEN SATURATION: 100 % | TEMPERATURE: 98 F | SYSTOLIC BLOOD PRESSURE: 92 MMHG | HEIGHT: 69 IN

## 2018-06-11 DIAGNOSIS — Z01.818 ENCOUNTER FOR OTHER PREPROCEDURAL EXAMINATION: ICD-10-CM

## 2018-06-11 DIAGNOSIS — I35.0 NONRHEUMATIC AORTIC (VALVE) STENOSIS: ICD-10-CM

## 2018-06-11 DIAGNOSIS — Z29.9 ENCOUNTER FOR PROPHYLACTIC MEASURES, UNSPECIFIED: ICD-10-CM

## 2018-06-11 DIAGNOSIS — Z90.89 ACQUIRED ABSENCE OF OTHER ORGANS: Chronic | ICD-10-CM

## 2018-06-11 LAB
BLD GP AB SCN SERPL QL: NEGATIVE — SIGNIFICANT CHANGE UP
HBA1C BLD-MCNC: 4.9 % — SIGNIFICANT CHANGE UP (ref 4–5.6)
MRSA PCR RESULT.: SIGNIFICANT CHANGE UP
RH IG SCN BLD-IMP: POSITIVE — SIGNIFICANT CHANGE UP
S AUREUS DNA NOSE QL NAA+PROBE: SIGNIFICANT CHANGE UP

## 2018-06-11 PROCEDURE — 86901 BLOOD TYPING SEROLOGIC RH(D): CPT

## 2018-06-11 PROCEDURE — 87640 STAPH A DNA AMP PROBE: CPT

## 2018-06-11 PROCEDURE — 86900 BLOOD TYPING SEROLOGIC ABO: CPT

## 2018-06-11 PROCEDURE — 83036 HEMOGLOBIN GLYCOSYLATED A1C: CPT

## 2018-06-11 PROCEDURE — 86850 RBC ANTIBODY SCREEN: CPT

## 2018-06-11 PROCEDURE — G0463: CPT

## 2018-06-11 PROCEDURE — 87641 MR-STAPH DNA AMP PROBE: CPT

## 2018-06-11 RX ORDER — CEFUROXIME AXETIL 250 MG
1500 TABLET ORAL ONCE
Qty: 0 | Refills: 0 | Status: DISCONTINUED | OUTPATIENT
Start: 2018-06-14 | End: 2018-06-16

## 2018-06-11 NOTE — H&P PST ADULT - PMH
Aortic stenosis    Deaf    Mute Aortic stenosis    Chronic systolic (congestive) heart failure    Deaf  congenital  Dizziness    Kidney disease, chronic, stage III (GFR 30-59 ml/min)

## 2018-06-11 NOTE — H&P PST ADULT - HISTORY OF PRESENT ILLNESS
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    The patient was initially adm to [facility] 5/22-5/31/18 for SOB / GARCIA x1 week.  He went to urgent care and was found to have a L pleff on CXR.  He was adm for CHF exacerbation.  TTE showed EF 23%, severe AS, mild AR, severe global LV systolic dysfunction, and decreased RVSF.  Pulm was on board and attempted a thora, but it was not successful.  The patient was planned for outpatient TAVR workup, but the family preferred transfer to [facility] for inpatient TAVR workup.  His beta blocker was discontinued 2/2 bradycardia.  The patient was noted to have episodes of VT on tele.  LifeVest was recommended, but the patient refused.    Currently, the patient reports feeling "perfect" and has no complaints. 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  He went to urgent care and was found to have a L pleff on CXR.  He was adm for CHF exacerbation.  TTE showed EF 23%, severe AS, mild AR, severe global LV systolic dysfunction, and decreased RVSF.  Pulm was on board and attempted a thora, but it was not successful.  The patient was planned for outpatient TAVR workup, but the family preferred transfer to [facility] for inpatient TAVR workup.  His beta blocker was discontinued 2/2 bradycardia.  The patient was noted to have episodes of VT on tele.  LifeVest was recommended, but the patient refused.    Currently, the patient reports feeling "perfect" and has no complaints. 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 pt had ED admission to [facility] on 05/22/2018. Pt had cardiac evaluation, TTE (EF 23%)- severe AS- scheduled for TAVR on 06/14/2018.    The patient was initially adm to [facility] 5/22-5/31/18 for SOB / GARCIA x1 week.  He went to urgent care and was found to have a L pleff on CXR.  He was adm for CHF exacerbation.  TTE showed EF 23%, severe AS, mild AR, severe global LV systolic dysfunction, and decreased RVSF.  Pulm was on board and attempted a thora, but it was not successful.  The patient was planned for outpatient TAVR workup, but the family preferred transfer to [facility] for inpatient TAVR workup.  His beta blocker was discontinued 2/2 bradycardia.  The patient was noted to have episodes of VT on tele.  LifeVest was recommended, but the patient refused.    Currently, the patient reports feeling "perfect" and has no complaints. 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 Pt had ED admission to [facility] on 05/22/2018. Pt had cardiac evaluation, TTE (EF 23 %,severe AS, mild AR, severe global LV systolic dysfunction, and decreased RVSF.)- Pt had  TAVR workup- scheduled for TAVR on 06/14/2018.    ** assisted with history taking

## 2018-06-11 NOTE — H&P PST ADULT - ASSESSMENT
CAPRINI SCORE [CLOT]    AGE RELATED RISK FACTORS                                                       MOBILITY RELATED FACTORS  [ ] Age 41-60 years                                            (1 Point)                  [ ] Bed rest                                                        (1 Point)  [ ] Age: 61-74 years                                           (2 Points)                 [ ] Plaster cast                                                   (2 Points)  [X ] Age= 75 years                                              (3 Points)                 [ ] Bed bound for more than 72 hours                 (2 Points)    DISEASE RELATED RISK FACTORS                                               GENDER SPECIFIC FACTORS  [ ] Edema in the lower extremities                       (1 Point)                  [ ] Pregnancy                                                     (1 Point)  [ ] Varicose veins                                               (1 Point)                  [ ] Post-partum < 6 weeks                                   (1 Point)             [X ] BMI > 25 Kg/m2                                            (1 Point)                  [ ] Hormonal therapy  or oral contraception          (1 Point)                 [ ] Sepsis (in the previous month)                        (1 Point)                  [ ] History of pregnancy complications                 (1 point)  [ ] Pneumonia or serious lung disease                                               [ ] Unexplained or recurrent                     (1 Point)           (in the previous month)                               (1 Point)  [ ] Abnormal pulmonary function test                     (1 Point)                 SURGERY RELATED RISK FACTORS  [ ] Acute myocardial infarction                              (1 Point)                 [ ]  Section                                             (1 Point)  [x ] Congestive heart failure (in the previous month)  (1 Point)               [ ] Minor surgery                                                  (1 Point)   [ ] Inflammatory bowel disease                             (1 Point)                 [ ] Arthroscopic surgery                                        (2 Points)  [ ] Central venous access                                      (2 Points)                [ x] General surgery lasting more than 45 minutes   (2 Points)       [ ] Stroke (in the previous month)                          (5 Points)               ] Elective arthroplasty                                         (5 Points)                                                                                                                                               HEMATOLOGY RELATED FACTORS                                                 TRAUMA RELATED RISK FACTORS  [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                                     (3 Points)                 [ ] Fracture of the hip, pelvis, or leg                       (5 Points)  [ ] Positive family history for VTE                         (3 Points)                 [ ] Acute spinal cord injury (in the previous month)  (5 Points)  [ ] Prothrombin 44449 A                                     (3 Points)                 [ ] Paralysis  (less than 1 month)                             (5 Points)  [ ] Factor V Leiden                                             (3 Points)                  [ ] Multiple Trauma within 1 month                        (5 Points)  [ ] Lupus anticoagulants                                     (3 Points)                                                           [ ] Anticardiolipin antibodies                               (3 Points)                                                       [ ] High homocysteine in the blood                      (3 Points)                                             [ ] Other congenital or acquired thrombophilia      (3 Points)                                                [ ] Heparin induced thrombocytopenia                  (3 Points)                                          Total Score [   7    ]

## 2018-06-13 ENCOUNTER — TRANSCRIPTION ENCOUNTER (OUTPATIENT)
Age: 83
End: 2018-06-13

## 2018-06-14 ENCOUNTER — APPOINTMENT (OUTPATIENT)
Dept: CARDIOTHORACIC SURGERY | Facility: HOSPITAL | Age: 83
End: 2018-06-14

## 2018-06-14 ENCOUNTER — INPATIENT (INPATIENT)
Facility: HOSPITAL | Age: 83
LOS: 1 days | Discharge: HOME | DRG: 267 | End: 2018-06-16
Attending: THORACIC SURGERY (CARDIOTHORACIC VASCULAR SURGERY) | Admitting: THORACIC SURGERY (CARDIOTHORACIC VASCULAR SURGERY)
Payer: MEDICARE

## 2018-06-14 VITALS
SYSTOLIC BLOOD PRESSURE: 90 MMHG | HEIGHT: 69 IN | DIASTOLIC BLOOD PRESSURE: 61 MMHG | WEIGHT: 194.67 LBS | OXYGEN SATURATION: 99 % | TEMPERATURE: 98 F | RESPIRATION RATE: 18 BRPM | HEART RATE: 89 BPM

## 2018-06-14 DIAGNOSIS — I35.0 NONRHEUMATIC AORTIC (VALVE) STENOSIS: ICD-10-CM

## 2018-06-14 DIAGNOSIS — Z90.89 ACQUIRED ABSENCE OF OTHER ORGANS: Chronic | ICD-10-CM

## 2018-06-14 LAB
ALBUMIN SERPL ELPH-MCNC: 2.9 G/DL — LOW (ref 3.3–5)
ALBUMIN SERPL ELPH-MCNC: 3.1 G/DL — LOW (ref 3.3–5)
ALP SERPL-CCNC: 86 U/L — SIGNIFICANT CHANGE UP (ref 40–120)
ALP SERPL-CCNC: 87 U/L — SIGNIFICANT CHANGE UP (ref 40–120)
ALT FLD-CCNC: 6 U/L — LOW (ref 10–45)
ALT FLD-CCNC: 7 U/L — LOW (ref 10–45)
ANION GAP SERPL CALC-SCNC: 14 MMOL/L — SIGNIFICANT CHANGE UP (ref 5–17)
ANION GAP SERPL CALC-SCNC: 16 MMOL/L — SIGNIFICANT CHANGE UP (ref 5–17)
APTT BLD: 30.2 SEC — SIGNIFICANT CHANGE UP (ref 27.5–37.4)
AST SERPL-CCNC: 15 U/L — SIGNIFICANT CHANGE UP (ref 10–40)
AST SERPL-CCNC: 17 U/L — SIGNIFICANT CHANGE UP (ref 10–40)
BASOPHILS # BLD AUTO: 0 K/UL — SIGNIFICANT CHANGE UP (ref 0–0.2)
BASOPHILS NFR BLD AUTO: 0.1 % — SIGNIFICANT CHANGE UP (ref 0–2)
BILIRUB SERPL-MCNC: 0.6 MG/DL — SIGNIFICANT CHANGE UP (ref 0.2–1.2)
BILIRUB SERPL-MCNC: 0.7 MG/DL — SIGNIFICANT CHANGE UP (ref 0.2–1.2)
BUN SERPL-MCNC: 44 MG/DL — HIGH (ref 7–23)
BUN SERPL-MCNC: 44 MG/DL — HIGH (ref 7–23)
CALCIUM SERPL-MCNC: 8.3 MG/DL — LOW (ref 8.4–10.5)
CALCIUM SERPL-MCNC: 8.5 MG/DL — SIGNIFICANT CHANGE UP (ref 8.4–10.5)
CHLORIDE SERPL-SCNC: 101 MMOL/L — SIGNIFICANT CHANGE UP (ref 96–108)
CHLORIDE SERPL-SCNC: 102 MMOL/L — SIGNIFICANT CHANGE UP (ref 96–108)
CO2 SERPL-SCNC: 24 MMOL/L — SIGNIFICANT CHANGE UP (ref 22–31)
CO2 SERPL-SCNC: 26 MMOL/L — SIGNIFICANT CHANGE UP (ref 22–31)
CREAT SERPL-MCNC: 1.96 MG/DL — HIGH (ref 0.5–1.3)
CREAT SERPL-MCNC: 2.01 MG/DL — HIGH (ref 0.5–1.3)
EOSINOPHIL # BLD AUTO: 0 K/UL — SIGNIFICANT CHANGE UP (ref 0–0.5)
EOSINOPHIL NFR BLD AUTO: 0.2 % — SIGNIFICANT CHANGE UP (ref 0–6)
GAS PNL BLDA: SIGNIFICANT CHANGE UP
GLUCOSE BLDC GLUCOMTR-MCNC: 107 MG/DL — HIGH (ref 70–99)
GLUCOSE SERPL-MCNC: 140 MG/DL — HIGH (ref 70–99)
GLUCOSE SERPL-MCNC: 141 MG/DL — HIGH (ref 70–99)
HCT VFR BLD CALC: 23.6 % — LOW (ref 39–50)
HCT VFR BLD CALC: 24.2 % — LOW (ref 39–50)
HGB BLD-MCNC: 7.9 G/DL — LOW (ref 13–17)
HGB BLD-MCNC: 8 G/DL — LOW (ref 13–17)
INR BLD: 1.13 RATIO — SIGNIFICANT CHANGE UP (ref 0.88–1.16)
LYMPHOCYTES # BLD AUTO: 0.9 K/UL — LOW (ref 1–3.3)
LYMPHOCYTES # BLD AUTO: 5.6 % — LOW (ref 13–44)
MAGNESIUM SERPL-MCNC: 2.3 MG/DL — SIGNIFICANT CHANGE UP (ref 1.6–2.6)
MCHC RBC-ENTMCNC: 33 GM/DL — SIGNIFICANT CHANGE UP (ref 32–36)
MCHC RBC-ENTMCNC: 33.1 PG — SIGNIFICANT CHANGE UP (ref 27–34)
MCHC RBC-ENTMCNC: 33.5 GM/DL — SIGNIFICANT CHANGE UP (ref 32–36)
MCHC RBC-ENTMCNC: 34.1 PG — HIGH (ref 27–34)
MCV RBC AUTO: 100 FL — SIGNIFICANT CHANGE UP (ref 80–100)
MCV RBC AUTO: 102 FL — HIGH (ref 80–100)
MONOCYTES # BLD AUTO: 0.2 K/UL — SIGNIFICANT CHANGE UP (ref 0–0.9)
MONOCYTES NFR BLD AUTO: 1.4 % — LOW (ref 2–14)
NEUTROPHILS # BLD AUTO: 14.2 K/UL — HIGH (ref 1.8–7.4)
NEUTROPHILS NFR BLD AUTO: 92.8 % — HIGH (ref 43–77)
PLATELET # BLD AUTO: 190 K/UL — SIGNIFICANT CHANGE UP (ref 150–400)
PLATELET # BLD AUTO: 212 K/UL — SIGNIFICANT CHANGE UP (ref 150–400)
POTASSIUM SERPL-MCNC: 3.5 MMOL/L — SIGNIFICANT CHANGE UP (ref 3.5–5.3)
POTASSIUM SERPL-MCNC: 3.6 MMOL/L — SIGNIFICANT CHANGE UP (ref 3.5–5.3)
POTASSIUM SERPL-SCNC: 3.5 MMOL/L — SIGNIFICANT CHANGE UP (ref 3.5–5.3)
POTASSIUM SERPL-SCNC: 3.6 MMOL/L — SIGNIFICANT CHANGE UP (ref 3.5–5.3)
PROT SERPL-MCNC: 6.1 G/DL — SIGNIFICANT CHANGE UP (ref 6–8.3)
PROT SERPL-MCNC: 6.3 G/DL — SIGNIFICANT CHANGE UP (ref 6–8.3)
PROTHROM AB SERPL-ACNC: 12.4 SEC — SIGNIFICANT CHANGE UP (ref 9.8–12.7)
RBC # BLD: 2.32 M/UL — LOW (ref 4.2–5.8)
RBC # BLD: 2.41 M/UL — LOW (ref 4.2–5.8)
RBC # FLD: 12.9 % — SIGNIFICANT CHANGE UP (ref 10.3–14.5)
RBC # FLD: 13.1 % — SIGNIFICANT CHANGE UP (ref 10.3–14.5)
RH IG SCN BLD-IMP: POSITIVE — SIGNIFICANT CHANGE UP
SODIUM SERPL-SCNC: 141 MMOL/L — SIGNIFICANT CHANGE UP (ref 135–145)
SODIUM SERPL-SCNC: 142 MMOL/L — SIGNIFICANT CHANGE UP (ref 135–145)
WBC # BLD: 12.4 K/UL — HIGH (ref 3.8–10.5)
WBC # BLD: 15.3 K/UL — HIGH (ref 3.8–10.5)
WBC # FLD AUTO: 12.4 K/UL — HIGH (ref 3.8–10.5)
WBC # FLD AUTO: 15.3 K/UL — HIGH (ref 3.8–10.5)

## 2018-06-14 PROCEDURE — 33361 REPLACE AORTIC VALVE PERQ: CPT | Mod: 62,Q0

## 2018-06-14 PROCEDURE — 93355 ECHO TRANSESOPHAGEAL (TEE): CPT

## 2018-06-14 PROCEDURE — 93010 ELECTROCARDIOGRAM REPORT: CPT | Mod: 76

## 2018-06-14 PROCEDURE — 33361 REPLACE AORTIC VALVE PERQ: CPT

## 2018-06-14 RX ORDER — SODIUM CHLORIDE 9 MG/ML
3 INJECTION INTRAMUSCULAR; INTRAVENOUS; SUBCUTANEOUS EVERY 8 HOURS
Qty: 0 | Refills: 0 | Status: DISCONTINUED | OUTPATIENT
Start: 2018-06-14 | End: 2018-06-16

## 2018-06-14 RX ORDER — LIDOCAINE HCL 20 MG/ML
0.2 VIAL (ML) INJECTION ONCE
Qty: 0 | Refills: 0 | Status: COMPLETED | OUTPATIENT
Start: 2018-06-14 | End: 2018-06-14

## 2018-06-14 RX ORDER — SODIUM CHLORIDE 9 MG/ML
3 INJECTION INTRAMUSCULAR; INTRAVENOUS; SUBCUTANEOUS EVERY 8 HOURS
Qty: 0 | Refills: 0 | Status: DISCONTINUED | OUTPATIENT
Start: 2018-06-14 | End: 2018-06-14

## 2018-06-14 RX ORDER — CEFUROXIME AXETIL 250 MG
1500 TABLET ORAL EVERY 8 HOURS
Qty: 0 | Refills: 0 | Status: COMPLETED | OUTPATIENT
Start: 2018-06-14 | End: 2018-06-15

## 2018-06-14 RX ORDER — HEPARIN SODIUM 5000 [USP'U]/ML
5000 INJECTION INTRAVENOUS; SUBCUTANEOUS EVERY 8 HOURS
Qty: 0 | Refills: 0 | Status: DISCONTINUED | OUTPATIENT
Start: 2018-06-14 | End: 2018-06-14

## 2018-06-14 RX ORDER — DOCUSATE SODIUM 100 MG
100 CAPSULE ORAL THREE TIMES A DAY
Qty: 0 | Refills: 0 | Status: DISCONTINUED | OUTPATIENT
Start: 2018-06-14 | End: 2018-06-16

## 2018-06-14 RX ORDER — FENTANYL CITRATE 50 UG/ML
25 INJECTION INTRAVENOUS
Qty: 0 | Refills: 0 | Status: DISCONTINUED | OUTPATIENT
Start: 2018-06-14 | End: 2018-06-14

## 2018-06-14 RX ORDER — CLOPIDOGREL BISULFATE 75 MG/1
75 TABLET, FILM COATED ORAL DAILY
Qty: 0 | Refills: 0 | Status: DISCONTINUED | OUTPATIENT
Start: 2018-06-14 | End: 2018-06-16

## 2018-06-14 RX ORDER — HEPARIN SODIUM 5000 [USP'U]/ML
5000 INJECTION INTRAVENOUS; SUBCUTANEOUS EVERY 8 HOURS
Qty: 0 | Refills: 0 | Status: DISCONTINUED | OUTPATIENT
Start: 2018-06-14 | End: 2018-06-16

## 2018-06-14 RX ORDER — ASPIRIN/CALCIUM CARB/MAGNESIUM 324 MG
81 TABLET ORAL DAILY
Qty: 0 | Refills: 0 | Status: DISCONTINUED | OUTPATIENT
Start: 2018-06-14 | End: 2018-06-16

## 2018-06-14 RX ORDER — SODIUM CHLORIDE 9 MG/ML
1000 INJECTION INTRAMUSCULAR; INTRAVENOUS; SUBCUTANEOUS
Qty: 0 | Refills: 0 | Status: DISCONTINUED | OUTPATIENT
Start: 2018-06-14 | End: 2018-06-16

## 2018-06-14 RX ORDER — FUROSEMIDE 40 MG
40 TABLET ORAL DAILY
Qty: 0 | Refills: 0 | Status: DISCONTINUED | OUTPATIENT
Start: 2018-06-14 | End: 2018-06-16

## 2018-06-14 RX ADMIN — SODIUM CHLORIDE 3 MILLILITER(S): 9 INJECTION INTRAMUSCULAR; INTRAVENOUS; SUBCUTANEOUS at 21:40

## 2018-06-14 RX ADMIN — Medication 0.2 MILLILITER(S): at 12:39

## 2018-06-14 RX ADMIN — Medication 100 MILLIGRAM(S): at 21:37

## 2018-06-14 RX ADMIN — SODIUM CHLORIDE 3 MILLILITER(S): 9 INJECTION INTRAMUSCULAR; INTRAVENOUS; SUBCUTANEOUS at 12:39

## 2018-06-14 RX ADMIN — CLOPIDOGREL BISULFATE 75 MILLIGRAM(S): 75 TABLET, FILM COATED ORAL at 21:37

## 2018-06-14 RX ADMIN — HEPARIN SODIUM 5000 UNIT(S): 5000 INJECTION INTRAVENOUS; SUBCUTANEOUS at 21:37

## 2018-06-14 RX ADMIN — Medication 100 MILLIGRAM(S): at 21:36

## 2018-06-14 NOTE — BRIEF OPERATIVE NOTE - PROCEDURE
<<-----Click on this checkbox to enter Procedure TAVR (transcatheter aortic valve replacement)  06/14/2018  Patient was taken to the [facility] OR on 6/14/18 and underwent TAVR utilizing a #29mm Fuentes Prosthetic valve  Active  [facility]

## 2018-06-14 NOTE — PRE-OP CHECKLIST - RESPIRATORY RATE (BREATHS/MIN)
Patient admitted to SICU room  from [facility] at 0900. Patient arrived via bed accompanied by RN and James DALEY. Patient arrived unmonitored.     Patient attached to monitor and assessed. Provider Marah ROSENBAUM notified of admission. Patient alert and oriented x3.     Belongings in room: see [facility].      POA-HC: see EPIC.              18

## 2018-06-14 NOTE — PATIENT PROFILE ADULT. - PMH
Aortic stenosis    Chronic systolic (congestive) heart failure    Deaf  congenital  Dizziness    Kidney disease, chronic, stage III (GFR 30-59 ml/min)

## 2018-06-15 ENCOUNTER — TRANSCRIPTION ENCOUNTER (OUTPATIENT)
Age: 83
End: 2018-06-15

## 2018-06-15 DIAGNOSIS — H91.90 UNSPECIFIED HEARING LOSS, UNSPECIFIED EAR: ICD-10-CM

## 2018-06-15 DIAGNOSIS — N18.3 CHRONIC KIDNEY DISEASE, STAGE 3 (MODERATE): ICD-10-CM

## 2018-06-15 DIAGNOSIS — Z95.3 PRESENCE OF XENOGENIC HEART VALVE: ICD-10-CM

## 2018-06-15 LAB
ALBUMIN SERPL ELPH-MCNC: 3.1 G/DL — LOW (ref 3.3–5)
ALP SERPL-CCNC: 84 U/L — SIGNIFICANT CHANGE UP (ref 40–120)
ALT FLD-CCNC: 6 U/L — LOW (ref 10–45)
ANION GAP SERPL CALC-SCNC: 13 MMOL/L — SIGNIFICANT CHANGE UP (ref 5–17)
AST SERPL-CCNC: 14 U/L — SIGNIFICANT CHANGE UP (ref 10–40)
BASOPHILS # BLD AUTO: 0 K/UL — SIGNIFICANT CHANGE UP (ref 0–0.2)
BASOPHILS NFR BLD AUTO: 0 % — SIGNIFICANT CHANGE UP (ref 0–2)
BILIRUB SERPL-MCNC: 0.5 MG/DL — SIGNIFICANT CHANGE UP (ref 0.2–1.2)
BUN SERPL-MCNC: 46 MG/DL — HIGH (ref 7–23)
CALCIUM SERPL-MCNC: 8.2 MG/DL — LOW (ref 8.4–10.5)
CHLORIDE SERPL-SCNC: 100 MMOL/L — SIGNIFICANT CHANGE UP (ref 96–108)
CO2 SERPL-SCNC: 25 MMOL/L — SIGNIFICANT CHANGE UP (ref 22–31)
CREAT SERPL-MCNC: 1.98 MG/DL — HIGH (ref 0.5–1.3)
EOSINOPHIL # BLD AUTO: 0 K/UL — SIGNIFICANT CHANGE UP (ref 0–0.5)
EOSINOPHIL NFR BLD AUTO: 0 % — SIGNIFICANT CHANGE UP (ref 0–6)
GLUCOSE SERPL-MCNC: 104 MG/DL — HIGH (ref 70–99)
HCT VFR BLD CALC: 23 % — LOW (ref 39–50)
HGB BLD-MCNC: 7.4 G/DL — LOW (ref 13–17)
IMM GRANULOCYTES NFR BLD AUTO: 0.3 % — SIGNIFICANT CHANGE UP (ref 0–1.5)
LYMPHOCYTES # BLD AUTO: 1.13 K/UL — SIGNIFICANT CHANGE UP (ref 1–3.3)
LYMPHOCYTES # BLD AUTO: 13.1 % — SIGNIFICANT CHANGE UP (ref 13–44)
MCHC RBC-ENTMCNC: 32.2 GM/DL — SIGNIFICANT CHANGE UP (ref 32–36)
MCHC RBC-ENTMCNC: 32.3 PG — SIGNIFICANT CHANGE UP (ref 27–34)
MCV RBC AUTO: 100.4 FL — HIGH (ref 80–100)
MONOCYTES # BLD AUTO: 0.4 K/UL — SIGNIFICANT CHANGE UP (ref 0–0.9)
MONOCYTES NFR BLD AUTO: 4.7 % — SIGNIFICANT CHANGE UP (ref 2–14)
NEUTROPHILS # BLD AUTO: 7.04 K/UL — SIGNIFICANT CHANGE UP (ref 1.8–7.4)
NEUTROPHILS NFR BLD AUTO: 81.9 % — HIGH (ref 43–77)
PLATELET # BLD AUTO: 214 K/UL — SIGNIFICANT CHANGE UP (ref 150–400)
POTASSIUM SERPL-MCNC: 4.7 MMOL/L — SIGNIFICANT CHANGE UP (ref 3.5–5.3)
POTASSIUM SERPL-SCNC: 4.7 MMOL/L — SIGNIFICANT CHANGE UP (ref 3.5–5.3)
PROT SERPL-MCNC: 6.2 G/DL — SIGNIFICANT CHANGE UP (ref 6–8.3)
RBC # BLD: 2.29 M/UL — LOW (ref 4.2–5.8)
RBC # FLD: 14 % — SIGNIFICANT CHANGE UP (ref 10.3–14.5)
SODIUM SERPL-SCNC: 138 MMOL/L — SIGNIFICANT CHANGE UP (ref 135–145)
WBC # BLD: 8.6 K/UL — SIGNIFICANT CHANGE UP (ref 3.8–10.5)
WBC # FLD AUTO: 8.6 K/UL — SIGNIFICANT CHANGE UP (ref 3.8–10.5)

## 2018-06-15 PROCEDURE — 93355 ECHO TRANSESOPHAGEAL (TEE): CPT

## 2018-06-15 PROCEDURE — 71045 X-RAY EXAM CHEST 1 VIEW: CPT | Mod: 26

## 2018-06-15 PROCEDURE — 99232 SBSQ HOSP IP/OBS MODERATE 35: CPT

## 2018-06-15 RX ORDER — SENNA PLUS 8.6 MG/1
2 TABLET ORAL AT BEDTIME
Qty: 0 | Refills: 0 | Status: DISCONTINUED | OUTPATIENT
Start: 2018-06-15 | End: 2018-06-16

## 2018-06-15 RX ORDER — FAMOTIDINE 10 MG/ML
20 INJECTION INTRAVENOUS DAILY
Qty: 0 | Refills: 0 | Status: DISCONTINUED | OUTPATIENT
Start: 2018-06-15 | End: 2018-06-16

## 2018-06-15 RX ADMIN — SODIUM CHLORIDE 3 MILLILITER(S): 9 INJECTION INTRAMUSCULAR; INTRAVENOUS; SUBCUTANEOUS at 05:21

## 2018-06-15 RX ADMIN — Medication 100 MILLIGRAM(S): at 04:00

## 2018-06-15 RX ADMIN — SODIUM CHLORIDE 3 MILLILITER(S): 9 INJECTION INTRAMUSCULAR; INTRAVENOUS; SUBCUTANEOUS at 21:50

## 2018-06-15 RX ADMIN — CLOPIDOGREL BISULFATE 75 MILLIGRAM(S): 75 TABLET, FILM COATED ORAL at 13:56

## 2018-06-15 RX ADMIN — SENNA PLUS 2 TABLET(S): 8.6 TABLET ORAL at 21:46

## 2018-06-15 RX ADMIN — Medication 100 MILLIGRAM(S): at 13:55

## 2018-06-15 RX ADMIN — Medication 100 MILLIGRAM(S): at 05:18

## 2018-06-15 RX ADMIN — SODIUM CHLORIDE 3 MILLILITER(S): 9 INJECTION INTRAMUSCULAR; INTRAVENOUS; SUBCUTANEOUS at 12:45

## 2018-06-15 RX ADMIN — HEPARIN SODIUM 5000 UNIT(S): 5000 INJECTION INTRAVENOUS; SUBCUTANEOUS at 13:54

## 2018-06-15 RX ADMIN — Medication 40 MILLIGRAM(S): at 05:18

## 2018-06-15 RX ADMIN — HEPARIN SODIUM 5000 UNIT(S): 5000 INJECTION INTRAVENOUS; SUBCUTANEOUS at 05:18

## 2018-06-15 RX ADMIN — Medication 100 MILLIGRAM(S): at 13:54

## 2018-06-15 RX ADMIN — FAMOTIDINE 20 MILLIGRAM(S): 10 INJECTION INTRAVENOUS at 13:55

## 2018-06-15 RX ADMIN — Medication 100 MILLIGRAM(S): at 21:44

## 2018-06-15 RX ADMIN — Medication 81 MILLIGRAM(S): at 13:54

## 2018-06-15 RX ADMIN — HEPARIN SODIUM 5000 UNIT(S): 5000 INJECTION INTRAVENOUS; SUBCUTANEOUS at 21:44

## 2018-06-15 NOTE — DISCHARGE NOTE ADULT - MEDICATION SUMMARY - MEDICATIONS TO TAKE
I will START or STAY ON the medications listed below when I get home from the hospital:    aspirin 81 mg oral delayed release tablet  -- 1 tab(s) by mouth once a day  -- Indication: For Blood thinner     clopidogrel 75 mg oral tablet  -- 1 tab(s) by mouth once a day  -- Indication: For Blood thinner     furosemide 40 mg oral tablet  -- 1 tab(s) by mouth once a day  -- Indication: For Diuretic     famotidine 20 mg oral tablet  -- 1 tab(s) by mouth once a day  -- Indication: For Acid Reflux     senna oral tablet  -- 2 tab(s) by mouth once a day (at bedtime), As Needed -for constipation   -- Indication: For Stool laxative as needed for Constipation     docusate sodium 100 mg oral capsule  -- 1 cap(s) by mouth 3 times a day, As Needed -for constipation   -- Indication: For Stool Softner as needed for constipation

## 2018-06-15 NOTE — DISCHARGE NOTE ADULT - PLAN OF CARE
Recovery 1. Daily Shower  2. Weight yourself daily and notify any weight gain greater than 2-3 pounds in 24 hours.  3. Regular diet - low fat, low cholesterol, no added salt.  4. Cleanse femoral groin sites daily while showering with warm water and mild soap, pat dry and maintain open to air.   5. Follow Cardiac Surgery Do's and Don'ts discharge instructions.   6. No driving until cleared by MD.   7. No heavy lifting nothing greater than 5 pounds until cleared by MD.   8. Call / Notify MD any fever greater than 101.0  9. Increase Activity as tolerated.   10. Notify MD and Dentist the need of antibiotic prophylaxis before any dental procedure to prevent infection of your new valve. 1. Daily Shower  2. Weight yourself daily and notify any weight gain greater than 2-3 pounds in 24 hours.  3. Regular diet - low fat, low cholesterol, no added salt.  4. Cleanse femoral groin sites daily while showering with warm water and mild soap, pat dry and maintain open to air.   5. Follow Post op TAVR instructions Do's and Don'ts discharge instructions.   6. No driving until cleared by MD.   7. No heavy lifting nothing greater than 5 pounds until cleared by MD.   8. Call / Notify MD any fever greater than 101.0  9. Increase Activity as tolerated.   10. Notify MD and Dentist the need of antibiotic prophylaxis before any dental procedure to prevent infection of your new valve.

## 2018-06-15 NOTE — PROGRESS NOTE ADULT - PROBLEM SELECTOR PLAN 1
Continue with ASA 81 mg PO daily   Continue with Plavix 75 mg PO daily   Continue with Lasix 40 mg PO daily   Increase activity as tolerated   Daily Shower to start on POD #2   Encouraged cough and incentive spirometry every 1 hour x 10   Continue with PUD and DVT prophylaxis   D/C Plan home PT in AM
ASA   Plavix  Echo pending

## 2018-06-15 NOTE — DISCHARGE NOTE ADULT - CARE PROVIDERS DIRECT ADDRESSES
[email or web address],DirectAddress_Unknown

## 2018-06-15 NOTE — DISCHARGE NOTE ADULT - CARE PLAN
Principal Discharge DX:	S/p TAVR (transcatheter aortic valve replacement), bioprosthetic  Goal:	Recovery  Assessment and plan of treatment:	1. Daily Shower  2. Weight yourself daily and notify any weight gain greater than 2-3 pounds in 24 hours.  3. Regular diet - low fat, low cholesterol, no added salt.  4. Cleanse femoral groin sites daily while showering with warm water and mild soap, pat dry and maintain open to air.   5. Follow Cardiac Surgery Do's and Don'ts discharge instructions.   6. No driving until cleared by MD.   7. No heavy lifting nothing greater than 5 pounds until cleared by MD.   8. Call / Notify MD any fever greater than 101.0  9. Increase Activity as tolerated.   10. Notify MD and Dentist the need of antibiotic prophylaxis before any dental procedure to prevent infection of your new valve. Principal Discharge DX:	S/p TAVR (transcatheter aortic valve replacement), bioprosthetic  Goal:	Recovery  Assessment and plan of treatment:	1. Daily Shower  2. Weight yourself daily and notify any weight gain greater than 2-3 pounds in 24 hours.  3. Regular diet - low fat, low cholesterol, no added salt.  4. Cleanse femoral groin sites daily while showering with warm water and mild soap, pat dry and maintain open to air.   5. Follow Post op TAVR instructions Do's and Don'ts discharge instructions.   6. No driving until cleared by MD.   7. No heavy lifting nothing greater than 5 pounds until cleared by MD.   8. Call / Notify MD any fever greater than 101.0  9. Increase Activity as tolerated.   10. Notify MD and Dentist the need of antibiotic prophylaxis before any dental procedure to prevent infection of your new valve.

## 2018-06-15 NOTE — PROGRESS NOTE ADULT - SUBJECTIVE AND OBJECTIVE BOX
VITAL SIGNS    Subjective: "I feel very good". ( at bedside) Denies CP, palpitation, SOB, GARCIA, HA, dizziness, N/V/D, fever or chills.  No acute events noted overnight.     Telemetry: NSR 60-80     Vital Signs Last 24 Hrs  T(C): 36.7 (06-15-18 @ 13:50), Max: 37.6 (06-14-18 @ 21:10)  T(F): 98.1 (06-15-18 @ 13:50), Max: 99.7 (06-14-18 @ 21:10)  HR: 69 (06-15-18 @ 13:50) (61 - 86)  BP: 104/61 (06-15-18 @ 13:50) (95/55 - 113/73)  RR: 18 (06-15-18 @ 13:50) (15 - 18)  SpO2: 97% (06-15-18 @ 13:50) (95% - 100%)           06-14 @ 07:01  -  06-15 @ 07:00  --------------------------------------------------------  IN: 130 mL / OUT: 250 mL / NET: -120 mL    PHYSICAL EXAM    Neurology: alert and oriented x 3, nonfocal, no gross deficits    CV: (+) S1 and S2, No murmurs, rubs, gallops or clicks     Lungs: CTA B/L     Abdomen: soft, nontender, nondistended, positive bowel sounds, (+) Flatus; (+) BM     :  Voiding               Extremities:  B/L LE (-) edema; negative calf tenderness; (+) 2 DP palpable; B/ L femoral groin sites C/D/I       aspirin enteric coated 81 milliGRAM(s) Oral daily  cefuroxime IVPB 1500 milliGRAM(s) IV Intermittent once  clopidogrel Tablet 75 milliGRAM(s) Oral daily  docusate sodium 100 milliGRAM(s) Oral three times a day  famotidine Tablet 20 milliGRAM(s) Oral daily  furosemide Tablet 40 milliGRAM(s) Oral daily  heparin Injectable 5000 Unit(s) SubCutaneous every 8 hours  senna 2 Tablet(s) Oral at bedtime      Physical Therapy Rec:   Home  [  ]   Home w/ PT  [ X ]  Rehab  [  ]    Discussed with Cardiothoracic Team at AM rounds.

## 2018-06-15 NOTE — DISCHARGE NOTE ADULT - NS AS ACTIVITY OBS
Walking-Indoors allowed/Walking-Outdoors allowed/Stairs allowed/No Heavy lifting/straining/Driving allowed/Showering allowed

## 2018-06-15 NOTE — PROGRESS NOTE ADULT - ASSESSMENT
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 Pt had ED admission to [facility] on 05/22/2018. 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 Pt had ED admission to [facility] on 05/22/2018. 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    Hospital course:  1) 6/14: Transferred from PACU to 2C Telemetry

## 2018-06-15 NOTE — DISCHARGE NOTE ADULT - ADDITIONAL INSTRUCTIONS
1. Follow up with Dr. De La Garza on 6/25/18 at 8:30 am for your post op follow up appointment, please call the [facility] office to confirm your appointment at [phone].   2. Please schedule an appointment with your Interventional Cardiologist Dr. Perez in 4 weeks after discharged from the hospital for a follow up check up and repeat echocardiogram, please call the office to schedule an appointment. 1. Follow up with Dr. De La Garza on 6/25/18 at 8:30 am for your post op follow up appointment, please call the [facility] office to confirm your appointment at [phone].   2. Please schedule an appointment with your Interventional Cardiologist Dr. Perez in 4 weeks after discharged from the hospital for a follow up check up and repeat echocardiogram, please call the office to schedule an appointment.  3. Please schedule an appointment with a PCP in 1-2 weeks at the [facility], call the office to schedule an appointment [phone].  Location: [address] [place] [address], [address].  [place] [address]  4. Please schedule an appointment with Dr. Waterman Podiatry, please call the office to schedule an appointment at [phone]

## 2018-06-15 NOTE — DISCHARGE NOTE ADULT - PATIENT PORTAL LINK FT
You can access the [facility] Portal, offered by [facility], by registering with the following website: [email or web address]

## 2018-06-15 NOTE — DISCHARGE NOTE ADULT - OTHER SIGNIFICANT FINDINGS
General: WN/WD NAD  Neurology: A&Ox3, nonfocal, WOLF x 4  Head:  Normocephalic, atraumatic  ENT:  Mucosa moist, no ulcerations  Neck:  Supple, no sinuses or palpable masses  Lymphatic:  No palpable cervical, supraclavicular, axillary or inguinal adenopathy  Respiratory: CTA B/L  CV: RRR, S1S2, no murmur  Abdominal: Soft, NT, ND no palpable mass  MSK: No edema, + peripheral pulses, FROM all 4 extremity  Incisions: intact, no erythema or drainage General: WN/WD NAD  Neurology: A&Ox3, nonfocal, WOLF x 4  Head:  Normocephalic, atraumatic  ENT:  Mucosa moist, no ulcerations  Neck:  Supple, no sinuses or palpable masses  Lymphatic:  No palpable cervical, supraclavicular, axillary or inguinal adenopathy  Respiratory: CTA B/L  CV: RRR, S1S2, no murmur  Abdominal: Soft, NT, ND no palpable mass  MSK: No edema, + peripheral pulses, FROM all 4 extremity  Incisions: intact, no erythema or drainage  Spent more than 30 min with patient.

## 2018-06-15 NOTE — DISCHARGE NOTE ADULT - HOSPITAL COURSE
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  Post op Course:   Unremarkable   6/14 Transferred from PACU to 2C Telemetry  6/15 VVS; Continue with current medication regimen; post op TTE finding revealed:Transcatheter aortic valve replacement. Peak transaortic valve gradient equals 23 mm Hg, mean transaortic valve gradient equals 8 mm Hg, which is probably normal in the presence of a transcatheter aortic valve replacement. Minimal aortic regurgitation. Negative effusion.   6/16 VVS; Discharged home

## 2018-06-15 NOTE — PROGRESS NOTE ADULT - PROBLEM SELECTOR PROBLEM 1
S/p TAVR (transcatheter aortic valve replacement), bioprosthetic
S/p TAVR (transcatheter aortic valve replacement), bioprosthetic

## 2018-06-15 NOTE — PROGRESS NOTE ADULT - SUBJECTIVE AND OBJECTIVE BOX
2C accept note   Events overnight:  CC: via     VITAL SIGNS  T(F): 99.7  HR: 86  BP: 113/73  RR: 18  SpO2: 99%    06-14-18 @ 07:01  -  06-15-18 @ 00:34  --------------------------------------------------------  OUT: 100 mL / NET: -100 mL      Weight (kg): 88.3 (06-14-18 @ 12:32)  LAB  142  |  102  |  44<H>  ----------------------------<  140<H>  3.6   |  26  |  1.96<H>                        8.0    12.4  )-----------( 190      ( 14 Jun 2018 19:08 )             24.2     CAPILLARY BLOOD GLUCOSE  POCT Blood Glucose.: 107 mg/dL (14 Jun 2018 12:07)    DIAGNOSTICS    MEDICATIONS  aspirin enteric coated 81 milliGRAM(s) Oral daily  cefuroxime  IVPB 1500 milliGRAM(s) IV Intermittent once  cefuroxime  IVPB 1500 milliGRAM(s) IV Intermittent every 8 hours  clopidogrel Tablet 75 milliGRAM(s) Oral daily  docusate sodium 100 milliGRAM(s) Oral three times a day  furosemide    Tablet 40 milliGRAM(s) Oral daily  heparin  Injectable 5000 Unit(s) SubCutaneous every 8 hours    PHYSICAL EXAM  GEN: No apparent distress, examined in   PSYCH: Appropriate, communicative, A+Ox3  NEURO: Non-focal,  A+Ox 3  CV: S1 S2 without rub or murmur  PULMONARY:  CTA, Decreased left base   INCENTIVE SPIROMETRY:  ABDOMEN:  Soft, non-tender, non-distended, bowel sounds active x 4  LBM:  : voiding   VASCULAR: +pp +radial  SKIN: Warm, dry, intact   Bilateral groin sites stable  MUSCULOSKELETAL:  Moves all extremities equally  Disposition:  ambulates with walker 2C accept note   Events overnight:  CC: via     VITAL SIGNS  T(F): 99.7  HR: 86  BP: 113/73  RR: 18  SpO2: 99%    06-14-18 @ 07:01  -  06-15-18 @ 00:34  --------------------------------------------------------  OUT: 100 mL / NET: -100 mL      Weight (kg): 88.3 (06-14-18 @ 12:32)  LAB  142  |  102  |  44<H>  ----------------------------<  140<H>  3.6   |  26  |  1.96<H>                        8.0    12.4  )-----------( 190      ( 14 Jun 2018 19:08 )             24.2     CAPILLARY BLOOD GLUCOSE  POCT Blood Glucose.: 107 mg/dL (14 Jun 2018 12:07)    DIAGNOSTICS    MEDICATIONS  aspirin enteric coated 81 milliGRAM(s) Oral daily  cefuroxime  IVPB 1500 milliGRAM(s) IV Intermittent once  cefuroxime  IVPB 1500 milliGRAM(s) IV Intermittent every 8 hours  clopidogrel Tablet 75 milliGRAM(s) Oral daily  docusate sodium 100 milliGRAM(s) Oral three times a day  furosemide    Tablet 40 milliGRAM(s) Oral daily  heparin  Injectable 5000 Unit(s) SubCutaneous every 8 hours    PHYSICAL EXAM  GEN: No apparent distress, examined in   PSYCH: Appropriate, communicative, A+Ox3  NEURO: Non-focal,  A+Ox 3  CV: S1 S2 without rub or murmur  PULMONARY:  CTA, Decreased left base   INCENTIVE SPIROMETRY:  ABDOMEN:  Soft, non-tender, non-distended, bowel sounds active x 4  LBM: pre op  : voiding   VASCULAR: +pp +radial  SKIN: Warm, dry, intact   Bilateral groin sites stable  MUSCULOSKELETAL:  Moves all extremities equally  Disposition:  ambulates with walker

## 2018-06-15 NOTE — PROGRESS NOTE ADULT - SUBJECTIVE AND OBJECTIVE BOX
This patient has been implanted with a Transcatheter Aortic Valve Implant under the following NCT/GEOVANY:    TAVR:    Commercial Implant NCT [id number], GEOVANY N/A and will be placed into the TVT Registry.

## 2018-06-15 NOTE — PROGRESS NOTE ADULT - ASSESSMENT
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  Post op Course:   Unremarkable   6/14 Transferred from PACU to  Telemetry  6/15 VVS; Continue with current medication regimen; post op TTE finding revealed:Transcatheter aortic valve replacement. Peak transaortic valve gradient equals 23 mm Hg, mean transaortic valve gradient equals 8 mm Hg, which is probably normal in the presence of a transcatheter aortic valve replacement. Minimal aortic regurgitation. Negative effusion.   Disposition: Home PT in AM

## 2018-06-15 NOTE — DISCHARGE NOTE ADULT - PROVIDER TOKENS
TOKEN:'[id number]',TOKEN:'[id number]' TOKEN:'[id number]',TOKEN:'[id number]',TOKEN:'[id number]'

## 2018-06-15 NOTE — DISCHARGE NOTE ADULT - CARE PROVIDER_API CALL
Hamlet De La Garza), Thoracic and Cardiac Surgery  [address] [place] [address]  Phone: [phone]  Fax: [phone]    Endy Perez), Cardiovascular Disease  [address] [place] [address]  Phone: [phone]  Fax: [phone] Hamlet De La Garza (MD), Thoracic and Cardiac Surgery  [address] [place] [address]  Phone: [phone]  Fax: [phone]    Endy Perez (MD), Cardiovascular Disease  [address] [place] [address]  Phone: [phone]  Fax: [phone]    Kalyn Mchugh), Surgery  Dept Director  [address] [place] [address]  Phone: [phone]  Fax: [phone]

## 2018-06-15 NOTE — PROGRESS NOTE ADULT - SUBJECTIVE AND OBJECTIVE BOX
*****Structural Heart Team*****    Subjective:    Patient resting comfortably in bed, offering no complaints.  at bedside. He states he ambulated earlier and felt fine, without any symptoms.    T(C): 36.7 (06-15-18 @ 06:08), Max: 37.6 (06-14-18 @ 21:10)  HR: 61 (06-15-18 @ 06:08) (61 - 89)  BP: 95/55 (06-15-18 @ 06:08) (90/61 - 113/73)  RR: 18 (06-15-18 @ 06:08) (14 - 18)  SpO2: 95% (06-15-18 @ 06:08) (86% - 100%)  Wt(kg): --  06-14 @ 07:01  -  06-15 @ 07:00  --------------------------------------------------------  IN: 130 mL / OUT: 250 mL / NET: -120 mL      MEDICATIONS  (STANDING):  aspirin enteric coated 81 milliGRAM(s) Oral daily  cefuroxime  IVPB 1500 milliGRAM(s) IV Intermittent once  cefuroxime  IVPB 1500 milliGRAM(s) IV Intermittent every 8 hours  clopidogrel Tablet 75 milliGRAM(s) Oral daily  docusate sodium 100 milliGRAM(s) Oral three times a day  famotidine    Tablet 20 milliGRAM(s) Oral daily  furosemide    Tablet 40 milliGRAM(s) Oral daily  heparin  Injectable 5000 Unit(s) SubCutaneous every 8 hours  senna 2 Tablet(s) Oral at bedtime  sodium chloride 0.9% lock flush 3 milliLiter(s) IV Push every 8 hours  sodium chloride 0.9%. 1000 milliLiter(s) (10 mL/Hr) IV Continuous <Continuous>    MEDICATIONS  (PRN):      Exam:  General: A/O x3  Pulmonary: CTAB  Cor: S1S2, RRR, no murmurs or rubs noted  ECG: SR with LBBB (Chronic)  Groin/Wound: Mild Ecchymosis B/L, no Hematoma, non tender  Abdomen: Soft, NT/ND, + Bowel sounds, tolerating PO Intake  Neuro: Non Focal  Extremeties: No edema, 1+ Pulses B/L                          7.4    8.60  )-----------( 214      ( 15 Jatinder 2018 07:49 )             23.0   06-15    138  |  100  |  46<H>  ----------------------------<  104<H>  4.7   |  25  |  1.98<H>    Ca    8.2<L>      15 Jatinder 2018 06:42  Mg     2.3     06-14    TPro  6.2  /  Alb  3.1<L>  /  TBili  0.5  /  DBili  x   /  AST  14  /  ALT  6<L>  /  AlkPhos  84  06-15  PT/INR - ( 14 Jun 2018 17:48 )   PT: 12.4 sec;   INR: 1.13 ratio         PTT - ( 14 Jun 2018 17:48 )  PTT:30.2 sec      Echo:    < from: Transthoracic Echocardiogram (06.15.18 @ 07:42) >  EF (Visual Estimate): 45 %  Doppler Peak Velocity (m/sec): AoV=2.4  ------------------------------------------------------------------------  Observations:  Mitral Valve: Mitral annular calcification, otherwise  normal mitral valve. Minimal mitral regurgitation.  Aortic Valve/Aorta: Transcatheter aortic valve replacement.  Peak transaortic valve gradient equals 23 mm Hg, mean  transaortic valve gradient equals 8 mm Hg, which is  probably normal in the presence of a transcatheter aortic  valve replacement. Minimal aortic regurgitation. Peak left  ventricular outflow tract gradient equals 6 mm Hg, mean  gradient is equal to 3 mm Hg, LVOT velocity time integral  equals 20 cm.  Aortic Root: 2.7 cm.  LVOT diameter: 2 cm.  Left Atrium: Mildly dilated left atrium.  LA volume index =  37 cc/m2.  Left Ventricle: Endocardium not well visualized; mild left  ventricular systolic dysfunction. Consider repeat imaging  with intravenous echo contrast to better visualize the LV  if clinically indicated. EF approximately 45% by visual  estimation. The inferolateral wall is hypokinetic. Septal  motion consistent with conduction defect. Increased  relative wall thickness with normal left ventricular mass  index, consistent with concentric left ventricular  remodeling. Mild diastolic dysfunction (Stage I).  Right Heart: Normal right atrium. Normal right ventricular  size and function. Tricuspid valve not well visualized,  probably normal. Minimal tricuspid regurgitation. Pulmonic  valve not well visualized, probably normal. No pulmonic  regurgitation.  Pericardium/Pleura: Normal pericardium with no pericardial  effusion.  Hemodynamic: Estimated right atrial pressure is 8 mm Hg.  Inadequate tricuspid regurgitation Doppler envelope  precludes estimation of RVSP.  ---------------------------------------------------------------    < end of copied text >    Assesment/Plan:  85 y/o male S/P Percutaneous Transfemoral TAVR POD#1, Deaf, Chronic Combined Systolic and Diastolic Heart Failure, Chronic Left Bundle Branch Block  1.) ASA 81 mg po Daily, Plavix 75 mg po daily  2.) Chronic Combined CHF: Low sodium low cholesterol diet. Continue Lasix 40 mg po daily  3.) Ambulate patient as tolerated  4.) Post op TTE reviewed. Valve function appropriate, with minimal leak, not differentiated as to intra or paravalvular in nature.  5.) Discharge Plan: Patient is to follow up with Dr. De La Garza in 1 week post discharge. HE should then follow up with Dr. Perez in 30 days, with a repeat Transthoracic Echo to be done at that visit. We will coordinate the presence of  to be present at the time of follow up visits.

## 2018-06-15 NOTE — PROGRESS NOTE ADULT - PROBLEM SELECTOR PROBLEM 3
Kidney disease, chronic, stage III (GFR 30-59 ml/min)
Kidney disease, chronic, stage III (GFR 30-59 ml/min)

## 2018-06-16 VITALS
DIASTOLIC BLOOD PRESSURE: 63 MMHG | SYSTOLIC BLOOD PRESSURE: 106 MMHG | RESPIRATION RATE: 17 BRPM | HEART RATE: 78 BPM | OXYGEN SATURATION: 96 % | TEMPERATURE: 98 F

## 2018-06-16 LAB
ANION GAP SERPL CALC-SCNC: 13 MMOL/L — SIGNIFICANT CHANGE UP (ref 5–17)
BUN SERPL-MCNC: 41 MG/DL — HIGH (ref 7–23)
CALCIUM SERPL-MCNC: 8.3 MG/DL — LOW (ref 8.4–10.5)
CHLORIDE SERPL-SCNC: 101 MMOL/L — SIGNIFICANT CHANGE UP (ref 96–108)
CO2 SERPL-SCNC: 28 MMOL/L — SIGNIFICANT CHANGE UP (ref 22–31)
CREAT SERPL-MCNC: 1.91 MG/DL — HIGH (ref 0.5–1.3)
GLUCOSE SERPL-MCNC: 90 MG/DL — SIGNIFICANT CHANGE UP (ref 70–99)
HCT VFR BLD CALC: 23.7 % — LOW (ref 39–50)
HGB BLD-MCNC: 7.9 G/DL — LOW (ref 13–17)
MCHC RBC-ENTMCNC: 33.1 GM/DL — SIGNIFICANT CHANGE UP (ref 32–36)
MCHC RBC-ENTMCNC: 33.5 PG — SIGNIFICANT CHANGE UP (ref 27–34)
MCV RBC AUTO: 101 FL — HIGH (ref 80–100)
PLATELET # BLD AUTO: 197 K/UL — SIGNIFICANT CHANGE UP (ref 150–400)
POTASSIUM SERPL-MCNC: 4.2 MMOL/L — SIGNIFICANT CHANGE UP (ref 3.5–5.3)
POTASSIUM SERPL-SCNC: 4.2 MMOL/L — SIGNIFICANT CHANGE UP (ref 3.5–5.3)
RBC # BLD: 2.34 M/UL — LOW (ref 4.2–5.8)
RBC # FLD: 12.9 % — SIGNIFICANT CHANGE UP (ref 10.3–14.5)
SODIUM SERPL-SCNC: 142 MMOL/L — SIGNIFICANT CHANGE UP (ref 135–145)
WBC # BLD: 7.5 K/UL — SIGNIFICANT CHANGE UP (ref 3.8–10.5)
WBC # FLD AUTO: 7.5 K/UL — SIGNIFICANT CHANGE UP (ref 3.8–10.5)

## 2018-06-16 PROCEDURE — 93355 ECHO TRANSESOPHAGEAL (TEE): CPT

## 2018-06-16 PROCEDURE — 85027 COMPLETE CBC AUTOMATED: CPT

## 2018-06-16 PROCEDURE — 83735 ASSAY OF MAGNESIUM: CPT

## 2018-06-16 PROCEDURE — 84295 ASSAY OF SERUM SODIUM: CPT

## 2018-06-16 PROCEDURE — C1894: CPT

## 2018-06-16 PROCEDURE — 85610 PROTHROMBIN TIME: CPT

## 2018-06-16 PROCEDURE — 86901 BLOOD TYPING SEROLOGIC RH(D): CPT

## 2018-06-16 PROCEDURE — 85014 HEMATOCRIT: CPT

## 2018-06-16 PROCEDURE — P9047: CPT

## 2018-06-16 PROCEDURE — 80053 COMPREHEN METABOLIC PANEL: CPT

## 2018-06-16 PROCEDURE — 82803 BLOOD GASES ANY COMBINATION: CPT

## 2018-06-16 PROCEDURE — C1887: CPT

## 2018-06-16 PROCEDURE — 76000 FLUOROSCOPY <1 HR PHYS/QHP: CPT

## 2018-06-16 PROCEDURE — P9016: CPT

## 2018-06-16 PROCEDURE — 93010 ELECTROCARDIOGRAM REPORT: CPT

## 2018-06-16 PROCEDURE — C1769: CPT

## 2018-06-16 PROCEDURE — 93005 ELECTROCARDIOGRAM TRACING: CPT

## 2018-06-16 PROCEDURE — 71045 X-RAY EXAM CHEST 1 VIEW: CPT

## 2018-06-16 PROCEDURE — 71045 X-RAY EXAM CHEST 1 VIEW: CPT | Mod: 26

## 2018-06-16 PROCEDURE — C1889: CPT

## 2018-06-16 PROCEDURE — 83605 ASSAY OF LACTIC ACID: CPT

## 2018-06-16 PROCEDURE — 82435 ASSAY OF BLOOD CHLORIDE: CPT

## 2018-06-16 PROCEDURE — 86923 COMPATIBILITY TEST ELECTRIC: CPT

## 2018-06-16 PROCEDURE — 82330 ASSAY OF CALCIUM: CPT

## 2018-06-16 PROCEDURE — 80048 BASIC METABOLIC PNL TOTAL CA: CPT

## 2018-06-16 PROCEDURE — 86900 BLOOD TYPING SEROLOGIC ABO: CPT

## 2018-06-16 PROCEDURE — 82947 ASSAY GLUCOSE BLOOD QUANT: CPT

## 2018-06-16 PROCEDURE — 99239 HOSP IP/OBS DSCHRG MGMT >30: CPT

## 2018-06-16 PROCEDURE — 82962 GLUCOSE BLOOD TEST: CPT

## 2018-06-16 PROCEDURE — C1760: CPT

## 2018-06-16 PROCEDURE — 84132 ASSAY OF SERUM POTASSIUM: CPT

## 2018-06-16 PROCEDURE — C1726: CPT

## 2018-06-16 PROCEDURE — 85730 THROMBOPLASTIN TIME PARTIAL: CPT

## 2018-06-16 RX ORDER — FAMOTIDINE 10 MG/ML
1 INJECTION INTRAVENOUS
Qty: 30 | Refills: 0 | OUTPATIENT
Start: 2018-06-16 | End: 2018-07-15

## 2018-06-16 RX ORDER — DOCUSATE SODIUM 100 MG
1 CAPSULE ORAL
Qty: 90 | Refills: 0 | OUTPATIENT
Start: 2018-06-16 | End: 2018-07-15

## 2018-06-16 RX ORDER — FUROSEMIDE 40 MG
1 TABLET ORAL
Qty: 30 | Refills: 0 | OUTPATIENT
Start: 2018-06-16 | End: 2018-07-15

## 2018-06-16 RX ORDER — ASPIRIN/CALCIUM CARB/MAGNESIUM 324 MG
1 TABLET ORAL
Qty: 30 | Refills: 0 | OUTPATIENT
Start: 2018-06-16 | End: 2018-07-15

## 2018-06-16 RX ORDER — CLOPIDOGREL BISULFATE 75 MG/1
1 TABLET, FILM COATED ORAL
Qty: 30 | Refills: 0 | OUTPATIENT
Start: 2018-06-16 | End: 2018-07-15

## 2018-06-16 RX ORDER — SENNA PLUS 8.6 MG/1
2 TABLET ORAL
Qty: 60 | Refills: 0 | OUTPATIENT
Start: 2018-06-16 | End: 2018-07-15

## 2018-06-16 RX ADMIN — Medication 40 MILLIGRAM(S): at 05:41

## 2018-06-16 RX ADMIN — CLOPIDOGREL BISULFATE 75 MILLIGRAM(S): 75 TABLET, FILM COATED ORAL at 10:13

## 2018-06-16 RX ADMIN — SODIUM CHLORIDE 3 MILLILITER(S): 9 INJECTION INTRAMUSCULAR; INTRAVENOUS; SUBCUTANEOUS at 05:46

## 2018-06-16 RX ADMIN — Medication 100 MILLIGRAM(S): at 05:42

## 2018-06-16 RX ADMIN — Medication 81 MILLIGRAM(S): at 10:13

## 2018-06-16 RX ADMIN — HEPARIN SODIUM 5000 UNIT(S): 5000 INJECTION INTRAVENOUS; SUBCUTANEOUS at 05:41

## 2018-06-16 RX ADMIN — FAMOTIDINE 20 MILLIGRAM(S): 10 INJECTION INTRAVENOUS at 10:13

## 2018-06-22 ENCOUNTER — EMERGENCY (EMERGENCY)
Facility: HOSPITAL | Age: 83
LOS: 1 days | Discharge: HOME | End: 2018-06-22
Attending: EMERGENCY MEDICINE
Payer: MEDICARE

## 2018-06-22 VITALS
TEMPERATURE: 98 F | RESPIRATION RATE: 18 BRPM | OXYGEN SATURATION: 99 % | SYSTOLIC BLOOD PRESSURE: 120 MMHG | DIASTOLIC BLOOD PRESSURE: 60 MMHG | HEART RATE: 66 BPM

## 2018-06-22 VITALS
RESPIRATION RATE: 18 BRPM | SYSTOLIC BLOOD PRESSURE: 137 MMHG | DIASTOLIC BLOOD PRESSURE: 75 MMHG | OXYGEN SATURATION: 96 % | HEART RATE: 70 BPM

## 2018-06-22 DIAGNOSIS — Z90.89 ACQUIRED ABSENCE OF OTHER ORGANS: Chronic | ICD-10-CM

## 2018-06-22 PROBLEM — Z80.9 FAMILY HISTORY OF MALIGNANT NEOPLASM: Status: ACTIVE | Noted: 2018-06-22

## 2018-06-22 PROBLEM — Z78.9 ALCOHOL INGESTION: Status: ACTIVE | Noted: 2018-06-22

## 2018-06-22 PROBLEM — Z87.19 HISTORY OF GASTROINTESTINAL HEMORRHAGE: Status: RESOLVED | Noted: 2018-06-22 | Resolved: 2018-06-22

## 2018-06-22 PROBLEM — Z86.69 HISTORY OF DEAFNESS: Status: ACTIVE | Noted: 2018-06-22

## 2018-06-22 PROBLEM — Z80.0 FAMILY HISTORY OF MALIGNANT NEOPLASM OF STOMACH: Status: ACTIVE | Noted: 2018-06-22

## 2018-06-22 PROBLEM — Z63.4 WIDOWED: Status: ACTIVE | Noted: 2018-06-22

## 2018-06-22 PROBLEM — R47.01 MUTENESS: Status: ACTIVE | Noted: 2018-06-22

## 2018-06-22 PROBLEM — Z86.79 HISTORY OF CHRONIC CONGESTIVE HEART FAILURE: Status: RESOLVED | Noted: 2018-06-22 | Resolved: 2018-06-22

## 2018-06-22 PROBLEM — N18.3 CKD (CHRONIC KIDNEY DISEASE), STAGE III: Status: ACTIVE | Noted: 2018-06-22

## 2018-06-22 PROBLEM — I35.0 AORTIC STENOSIS, SEVERE: Status: RESOLVED | Noted: 2018-06-22 | Resolved: 2018-06-22

## 2018-06-22 PROBLEM — Z87.19 HISTORY OF GASTRIC ULCER: Status: RESOLVED | Noted: 2018-06-22 | Resolved: 2018-06-22

## 2018-06-22 PROCEDURE — 93971 EXTREMITY STUDY: CPT | Mod: 26

## 2018-06-22 PROCEDURE — 93971 EXTREMITY STUDY: CPT

## 2018-06-22 PROCEDURE — 73564 X-RAY EXAM KNEE 4 OR MORE: CPT

## 2018-06-22 PROCEDURE — 99284 EMERGENCY DEPT VISIT MOD MDM: CPT

## 2018-06-22 PROCEDURE — 99284 EMERGENCY DEPT VISIT MOD MDM: CPT | Mod: 25

## 2018-06-22 PROCEDURE — 73564 X-RAY EXAM KNEE 4 OR MORE: CPT | Mod: 26,RT

## 2018-06-22 RX ORDER — FUROSEMIDE 40 MG/1
40 TABLET ORAL DAILY
Qty: 14 | Refills: 0 | Status: ACTIVE | COMMUNITY

## 2018-06-22 RX ORDER — DOCUSATE SODIUM 100 MG/1
100 CAPSULE ORAL 3 TIMES DAILY
Refills: 0 | Status: ACTIVE | COMMUNITY

## 2018-06-22 RX ORDER — FAMOTIDINE 20 MG/1
20 TABLET, FILM COATED ORAL
Refills: 0 | Status: ACTIVE | COMMUNITY

## 2018-06-22 RX ORDER — SENNOSIDES 8.6 MG TABLETS 8.6 MG/1
TABLET ORAL
Refills: 0 | Status: ACTIVE | COMMUNITY

## 2018-06-22 RX ORDER — ASPIRIN 81 MG
81 TABLET, DELAYED RELEASE (ENTERIC COATED) ORAL DAILY
Refills: 0 | Status: ACTIVE | COMMUNITY

## 2018-06-22 RX ORDER — ACETAMINOPHEN 500 MG
975 TABLET ORAL ONCE
Qty: 0 | Refills: 0 | Status: COMPLETED | OUTPATIENT
Start: 2018-06-22 | End: 2018-06-22

## 2018-06-22 RX ADMIN — Medication 975 MILLIGRAM(S): at 15:39

## 2018-06-22 NOTE — ED PROVIDER NOTE - PHYSICAL EXAMINATION
Gen: NAD  Eyes:  sclerae white, no icterus  ENT: Moist mucous membranes. No exudates  Neck: supple, no LAD, mass or goiter, trachea midline  CV: RRR. Audible S1 and S2. No murmurs, rubs, gallops, S3, nor S4  Pulm: Clear to auscultation bilaterally. No wheezes, rales, or rhonchi  Abd: BS+, nondistended, No tenderness to palpation  Musculoskeletal:  mild R. knee swelling, no tenderness, mild TTP R. proximal calf  Skin: no lesions or scars noted  Psych: mood good, affect full range and congruent with mood.  Neurologic: AAOx3

## 2018-06-22 NOTE — ED PROVIDER NOTE - ATTENDING CONTRIBUTION TO CARE
Patient is an 85 yo M s/p TAVR 2/2 AS on 6/14/18, congenital deafness, CKD, here for evaluation of right leg pain x 3 days. Patient reports pain with walking. No trauma or injury. Denies chest pain, sob, fevers, chills, nausea, vomiting, abdominal pain. (History taken with hearing impaired  present.). No history of DVT/PE.   VS noted  Gen. no acute distress, Non toxic   HEENT: EOMI, mmm  Lungs: CTAB/L no C/ W /R   CVS: RRR   Abd; Soft non tender, non distended   Ext: right femoral region with no swelling, tenderness, right knee mild tenderness, no apparent swelling, + calf tenderness  Skin: no rash  Neuro AAOx3 non focal clear speech  a/p: right leg pain, s/p TAVR - will treat pain and r/o DVT. No signs of infection on exam.   - Chasity GLEASON

## 2018-06-22 NOTE — ED ADULT NURSE NOTE - OBJECTIVE STATEMENT
1215 86 yr old WF brought to ER via ambulance on stretcher for further eval and tx of severe right knee pain and swelling. s/p TAVR last week.  paged. Pt nonverbal, [place], communicates by sign language. Denies chest pain, dizziness, fever, chills, SOB. Color pink. skin W&D. Lungs clear. abd soft. Tiny scab notedat right groin. IV intact without sx of infilt. Was given IV Fentanyl by EMS enroute to ER. Sign language utilized by ER EDMUND Awan 1215 86 yr old WF brought to ER via ambulance on stretcher for further eval and tx of severe right knee pain and swelling. s/p TAVR last week.  paged. Pt nonverbal, deaf, communicates by sign language. Denies chest pain, dizziness, fever, chills, SOB. Color pink. skin W&D. Lungs clear. abd soft. Tiny scab notedat right groin. IV intact without sx of infilt. Was given IV Fentanyl by EMS enroute to ER with good relief of pain. Sign language utilized by ER EDMUND Awan 1215 86 yr old WF brought to ER via ambulance on stretcher for further eval and tx of severe right knee pain and swelling. s/p TAVR last week.  paged. Pt nonverbal, deaf, communicates by sign language. Denies chest pain, dizziness, fever, chills, SOB. Color pink. skin W&D. Lungs clear. abd soft. Tiny scab notedat right groin. IV intact without sx of infilt RACF. Was given IV Fentanyl by EMS enroute to ER with good relief of pain. Sign language utilized by ER EDMUND Awan 1215 86 yr old WF brought to ER via ambulance on stretcher for further eval and tx of severe right knee pain and swelling. s/p TAVR last week.  paged. Pt nonverbal, deaf, communicates by sign language. Denies chest pain, dizziness, fever, chills, SOB. Color pink. skin W&D. Lungs clear. abd soft. Tiny scab noted at right groin. IV intact without sx of infilt RACF. Was given IV Fentanyl by EMS enroute to ER with good relief of pain. Sign language utilized by ER EDMUND Awan 1215 86 yr old WF brought to ER via ambulance on stretcher for further eval and tx of severe right knee pain and swelling. s/p TAVR last week.  paged. Pt nonverbal, deaf, communicates by sign language. Denies chest pain, dizziness, fever, chills, SOB. Color pink. skin W&D. Lungs clear. abd soft. Tiny scab noted at right groin. IV intact without sx of infilt RACF. Was given IV Fentanyl by EMS enroute to ER with good relief of pain. Sign language utilized by ER RN Tiffanie Malhotra.

## 2018-06-22 NOTE — CHART NOTE - NSCHARTNOTEFT_GEN_A_CORE
EMERGENCY ROOM : LMSW reviewed chart for 30 day readmission with FABRICIO Quinn. Patient with PMH of Chronic kidney disease, CHF, Deaf-mutism, BIB EMS from home for leg pain. Patient with recent admission to [facility] on 6/14 for TAVR and discharged on 6/16 with home care referral for home P/T and visiting RN. Medical Team awaiting arrival of .    Per previous chart, patient identifies caregiver as Yaa CAMPOS (ph. [phone]). LMSW contacted DIL for the purpose of confirming demographic information. Patient resides with DIL and son on the 1st floor of a 2 family home (0 steps to enter) in [place], [place]. DIL reports patient's son is ill and she assist with all patient needs. PTA, patient independent in ADLs and ambulates with a straight cane. DIL reports patient with [employer] Medicare insurance benefits.    Previous discharge explored. DIL reports patient is currently receiving home care services via [facility]. RN presented and evaluated patient on 6/20 and P/T was scheduled to report today. DIL reports when she visited patient this morning, "he looked ill and stated that he was unable to walk." Per DIL, patient was shaking in bed and seemed confused. DIL subsequently canceled scheduled P/T visit and contacted EMS for transport to [facility] ED. DIL reports it has become increasingly difficult to care for patient due to increasing needs. DIL requesting assistance with Long Term Care Planning. Per DIL, patient on a fixed income (receives $1000 in Social Security income) and contributes $400 monthly to living expenses. DIL requesting for patient to be screened for Medicaid eligibility. LMSw offered to contact [facility] to request SW intervention for long term care planning. LMSW contacted [facility] and spoke with RN Supervisor Christina who reports Social Work Services are pending pre-certification from patient's insurance. DIL informed of the above.     LMSW received call from patient's treating RN who reports patient medically cleared for discharge. Patient demonstrated steady ambulation. DIL informed by medical team of patient's discharge. Treating RN to arrange discharge transportation with [employer] and DIL to make payment upon patient's arrival to home. LMSW contacted [facility] (ph. [phone]) to notify of ED treat No barriers to discharge noted. EMERGENCY ROOM : LMSW reviewed chart for 30 day readmission with FABRICIO Quinn. Patient with PMH of Chronic kidney disease, CHF, Deaf-mutism, BIB EMS from home for leg pain. Patient with recent admission to [facility] on 6/14 for TAVR and discharged on 6/16 with home care referral for home P/T and visiting RN. Medical Team awaiting arrival of .    Per previous chart, patient identifies caregiver as Yaa CAMPOS (ph. [phone]). LMSW contacted DIL for the purpose of confirming demographic information. Patient resides with DIL and son on the 1st floor of a 2 family home (0 steps to enter) in [place], [place]. DIL reports patient's son is ill and she assist with all patient needs. PTA, patient independent in ADLs and ambulates with a straight cane. DIL reports patient with [employer] Medicare insurance benefits.    Previous discharge explored. DIL reports patient is currently receiving home care services via [facility]. RN presented and evaluated patient on 6/20 and P/T was scheduled to report today. DIL reports when she visited patient this morning, "he looked ill and stated that he was unable to walk." Per DIL, patient was shaking in bed and seemed confused. DIL subsequently canceled scheduled P/T visit and contacted EMS for transport to [facility] ED. DIL reports it has become increasingly difficult to care for patient due to increasing needs. DIL requesting assistance with Long Term Care Planning. Per DIL, patient on a fixed income (receives $1000 in Social Security income) and contributes $400 monthly to living expenses. DIL requesting for patient to be screened for Medicaid eligibility. LMSw offered to contact [facility] to request SW intervention for long term care planning. LMSW contacted [facility] and spoke with RN Supervisor Christina who reports Social Work Services are pending pre-certification from patient's insurance. DIL informed of the above.     LMSW received call from patient's treating RN who reports patient medically cleared for discharge. Patient demonstrated steady ambulation. DIL informed by medical team of patient's discharge. Treating RN to arrange discharge transportation with [employer] and DIL to make payment upon patient's arrival to home. LMSW contacted [facility] and spoke to RN Supervisor Christina (ph. [phone]) to notify of ED treat and release. No barriers to discharge noted.

## 2018-06-22 NOTE — ED PROVIDER NOTE - OBJECTIVE STATEMENT
Hx Hx AS s/p TAVR on 6/14, deafness and muteness, CKDIII, presents w/ R. knee pain and calf pain and swelling x 3 days, no trauma, no cough, fever, chills, n/v/d, CP, or SOB. Hx AS s/p TAVR on 6/14, deafness and muteness, CKDIII, presents w/ R. knee pain and calf pain and swelling x 3 days, no trauma, no cough, fever, chills, n/v/d, CP, or SOB. Able to ambulate w/out difficulty. No pain or swelling at catherization site.   Translation provided by [employer].

## 2018-06-22 NOTE — ED CLERICAL - NS ED CLERK NOTE PRE-ARRIVAL INFORMATION; ADDITIONAL PRE-ARRIVAL INFORMATION
This patient is deaf/mute. Please use - This patient is enrolled in the Follow Your Heart program and has undergone a cardiac surgery procedure within the last 30 days and has active care navigation.   This patient can be followed up by the care navigation team within 24 hours. To arrange close follow-up or to obtain additional clinical information about this patient, please call the contact number above.   Please call the cardiac surgery team once patient is registered at [phone] for consultation PRIOR to disposition decision.  The patient recently underwent a cardiac surgery procedure and the team can assist in acute medical management.

## 2018-06-22 NOTE — ED ADULT NURSE REASSESSMENT NOTE - NS ED NURSE REASSESS COMMENT FT1
arrived in ER. Patient is in Ultrasound and  will go find patient in ultrasound.   was pages on patient's arrival in ER, but red desk had incorrect pager number and was not getting a reply. At 1430 when I arrived in ER I found the correct number and paged and got an immediate reply.

## 2018-06-25 ENCOUNTER — APPOINTMENT (OUTPATIENT)
Dept: CARDIOTHORACIC SURGERY | Facility: CLINIC | Age: 83
End: 2018-06-25
Payer: MEDICARE

## 2018-06-25 VITALS
RESPIRATION RATE: 13 BRPM | OXYGEN SATURATION: 98 % | SYSTOLIC BLOOD PRESSURE: 104 MMHG | HEIGHT: 67 IN | TEMPERATURE: 98.1 F | DIASTOLIC BLOOD PRESSURE: 70 MMHG | HEART RATE: 80 BPM | WEIGHT: 192 LBS | BODY MASS INDEX: 30.13 KG/M2

## 2018-06-25 DIAGNOSIS — Z87.19 PERSONAL HISTORY OF OTHER DISEASES OF THE DIGESTIVE SYSTEM: ICD-10-CM

## 2018-06-25 DIAGNOSIS — Z63.4 DISAPPEARANCE AND DEATH OF FAMILY MEMBER: ICD-10-CM

## 2018-06-25 DIAGNOSIS — Z86.79 PERSONAL HISTORY OF OTHER DISEASES OF THE CIRCULATORY SYSTEM: ICD-10-CM

## 2018-06-25 DIAGNOSIS — I35.0 NONRHEUMATIC AORTIC (VALVE) STENOSIS: ICD-10-CM

## 2018-06-25 DIAGNOSIS — Z86.69 PERSONAL HISTORY OF OTHER DISEASES OF THE NERVOUS SYSTEM AND SENSE ORGANS: ICD-10-CM

## 2018-06-25 DIAGNOSIS — Z80.0 FAMILY HISTORY OF MALIGNANT NEOPLASM OF DIGESTIVE ORGANS: ICD-10-CM

## 2018-06-25 DIAGNOSIS — N18.3 CHRONIC KIDNEY DISEASE, STAGE 3 (MODERATE): ICD-10-CM

## 2018-06-25 DIAGNOSIS — R47.01 APHASIA: ICD-10-CM

## 2018-06-25 DIAGNOSIS — Z78.9 OTHER SPECIFIED HEALTH STATUS: ICD-10-CM

## 2018-06-25 DIAGNOSIS — Z80.9 FAMILY HISTORY OF MALIGNANT NEOPLASM, UNSPECIFIED: ICD-10-CM

## 2018-06-25 PROCEDURE — 99213 OFFICE O/P EST LOW 20 MIN: CPT

## 2018-06-25 SDOH — SOCIAL STABILITY - SOCIAL INSECURITY: DISSAPEARANCE AND DEATH OF FAMILY MEMBER: Z63.4

## 2018-07-27 ENCOUNTER — APPOINTMENT (OUTPATIENT)
Dept: CARDIOTHORACIC SURGERY | Facility: CLINIC | Age: 83
End: 2018-07-27
Payer: MEDICARE

## 2018-07-27 ENCOUNTER — NON-APPOINTMENT (OUTPATIENT)
Age: 83
End: 2018-07-27

## 2018-07-27 ENCOUNTER — OUTPATIENT (OUTPATIENT)
Dept: OUTPATIENT SERVICES | Facility: HOSPITAL | Age: 83
LOS: 1 days | End: 2018-07-27
Payer: MEDICARE

## 2018-07-27 ENCOUNTER — APPOINTMENT (OUTPATIENT)
Dept: CV DIAGNOSITCS | Facility: HOSPITAL | Age: 83
End: 2018-07-27

## 2018-07-27 VITALS
WEIGHT: 190 LBS | BODY MASS INDEX: 29.82 KG/M2 | HEART RATE: 76 BPM | HEIGHT: 67 IN | SYSTOLIC BLOOD PRESSURE: 126 MMHG | OXYGEN SATURATION: 98 % | DIASTOLIC BLOOD PRESSURE: 78 MMHG | TEMPERATURE: 97.9 F | RESPIRATION RATE: 16 BRPM

## 2018-07-27 DIAGNOSIS — Z95.2 PRESENCE OF PROSTHETIC HEART VALVE: ICD-10-CM

## 2018-07-27 DIAGNOSIS — I50.22 CHRONIC SYSTOLIC (CONGESTIVE) HEART FAILURE: ICD-10-CM

## 2018-07-27 DIAGNOSIS — I25.10 ATHEROSCLEROTIC HEART DISEASE OF NATIVE CORONARY ARTERY WITHOUT ANGINA PECTORIS: ICD-10-CM

## 2018-07-27 DIAGNOSIS — Z90.89 ACQUIRED ABSENCE OF OTHER ORGANS: Chronic | ICD-10-CM

## 2018-07-27 DIAGNOSIS — D63.1 CHRONIC KIDNEY DISEASE, UNSPECIFIED: ICD-10-CM

## 2018-07-27 DIAGNOSIS — N18.9 CHRONIC KIDNEY DISEASE, UNSPECIFIED: ICD-10-CM

## 2018-07-27 PROBLEM — R42 DIZZINESS AND GIDDINESS: Chronic | Status: ACTIVE | Noted: 2018-06-11

## 2018-07-27 PROBLEM — I35.0 NONRHEUMATIC AORTIC (VALVE) STENOSIS: Chronic | Status: ACTIVE | Noted: 2018-05-22

## 2018-07-27 PROBLEM — N18.3 CHRONIC KIDNEY DISEASE, STAGE 3 (MODERATE): Chronic | Status: ACTIVE | Noted: 2018-06-11

## 2018-07-27 PROCEDURE — 93306 TTE W/DOPPLER COMPLETE: CPT

## 2018-07-27 PROCEDURE — 93306 TTE W/DOPPLER COMPLETE: CPT | Mod: 26

## 2018-07-27 PROCEDURE — 93000 ELECTROCARDIOGRAM COMPLETE: CPT

## 2018-07-27 PROCEDURE — 99215 OFFICE O/P EST HI 40 MIN: CPT | Mod: 25

## 2018-07-27 RX ORDER — CLOPIDOGREL 75 MG/1
75 TABLET, FILM COATED ORAL DAILY
Qty: 14 | Refills: 0 | Status: DISCONTINUED | COMMUNITY
End: 2018-07-27

## 2018-12-04 ENCOUNTER — EMERGENCY (EMERGENCY)
Facility: HOSPITAL | Age: 83
LOS: 1 days | Discharge: HOME | End: 2018-12-04
Attending: EMERGENCY MEDICINE | Admitting: EMERGENCY MEDICINE
Payer: MEDICARE

## 2018-12-04 VITALS
TEMPERATURE: 98 F | RESPIRATION RATE: 18 BRPM | OXYGEN SATURATION: 97 % | DIASTOLIC BLOOD PRESSURE: 70 MMHG | HEART RATE: 66 BPM | SYSTOLIC BLOOD PRESSURE: 143 MMHG

## 2018-12-04 VITALS
RESPIRATION RATE: 18 BRPM | SYSTOLIC BLOOD PRESSURE: 137 MMHG | HEART RATE: 72 BPM | TEMPERATURE: 98 F | OXYGEN SATURATION: 100 % | DIASTOLIC BLOOD PRESSURE: 89 MMHG

## 2018-12-04 DIAGNOSIS — Z90.89 ACQUIRED ABSENCE OF OTHER ORGANS: Chronic | ICD-10-CM

## 2018-12-04 PROCEDURE — 73564 X-RAY EXAM KNEE 4 OR MORE: CPT | Mod: 26,RT

## 2018-12-04 PROCEDURE — 72100 X-RAY EXAM L-S SPINE 2/3 VWS: CPT | Mod: 26

## 2018-12-04 PROCEDURE — 99284 EMERGENCY DEPT VISIT MOD MDM: CPT

## 2018-12-04 PROCEDURE — 73522 X-RAY EXAM HIPS BI 3-4 VIEWS: CPT | Mod: 26

## 2018-12-04 RX ORDER — LIDOCAINE 4 G/100G
1 CREAM TOPICAL ONCE
Qty: 0 | Refills: 0 | Status: COMPLETED | OUTPATIENT
Start: 2018-12-04 | End: 2018-12-04

## 2018-12-04 RX ORDER — METHOCARBAMOL 500 MG/1
2 TABLET, FILM COATED ORAL
Qty: 40 | Refills: 0 | OUTPATIENT
Start: 2018-12-04 | End: 2018-12-08

## 2018-12-04 RX ORDER — IBUPROFEN 200 MG
600 TABLET ORAL ONCE
Qty: 0 | Refills: 0 | Status: COMPLETED | OUTPATIENT
Start: 2018-12-04 | End: 2018-12-04

## 2018-12-04 RX ORDER — FUROSEMIDE 40 MG
1 TABLET ORAL
Qty: 7 | Refills: 0 | OUTPATIENT
Start: 2018-12-04 | End: 2018-12-10

## 2018-12-04 RX ORDER — DIAZEPAM 5 MG
5 TABLET ORAL ONCE
Qty: 0 | Refills: 0 | Status: DISCONTINUED | OUTPATIENT
Start: 2018-12-04 | End: 2018-12-04

## 2018-12-04 RX ORDER — IBUPROFEN 200 MG
1 TABLET ORAL
Qty: 20 | Refills: 0 | OUTPATIENT
Start: 2018-12-04 | End: 2018-12-08

## 2018-12-04 RX ORDER — LIDOCAINE 4 G/100G
1 CREAM TOPICAL
Qty: 5 | Refills: 0 | OUTPATIENT
Start: 2018-12-04 | End: 2018-12-08

## 2018-12-04 RX ADMIN — LIDOCAINE 1 PATCH: 4 CREAM TOPICAL at 18:06

## 2018-12-04 RX ADMIN — Medication 600 MILLIGRAM(S): at 18:06

## 2018-12-04 RX ADMIN — Medication 5 MILLIGRAM(S): at 18:06

## 2018-12-04 NOTE — ED ADULT NURSE REASSESSMENT NOTE - NS ED NURSE REASSESS COMMENT FT1
pt resting comfortably in bed in no apparent distress,  at bedside, pt offers no new complaints at this time, no signs of fracture on Xray pt to be discharged awaiting family

## 2018-12-04 NOTE — PROVIDER CONTACT NOTE (OTHER) - ASSESSMENT
CM spoke with Daughter in law Yaa [phone] in regards to d/c planning. Yaa is requesting  Medicaid info for HHA services. Medicaid office # provided. Pt loves with Yaa and Son who helps pt.  No Skilled nursing needs at this time. Family to At the help transp when medically stable for d/c.

## 2018-12-04 NOTE — ED PROVIDER NOTE - OBJECTIVE STATEMENT
85 y/o male pmh chf, AS, deaf, c/o R lower back pain x5 months.  used for hx. Pt admits to having an angiogram in July 2018. Pt admits that after that hospital stay he felt fine, but within several weeks he developed R lower back pain, which radiates down R leg. Pt admits to worsening pain with walking and changing positions, and relief with rest. Pt admits to taking several different pain meds which do not help and only make him dizzy. Pt admits to falling at home x1 month ago, denies head trauma. Pt states that since the fall, he has had more pain in his R hip and R knee. Pt denies chest pain, sob, abd pain, n/v/d, numbness, tingling, weakness, bowel or bladder incontinence, dizziness, syncope, fever or chills.

## 2018-12-04 NOTE — ED PROVIDER NOTE - PROGRESS NOTE DETAILS
KARI Rainey- SPoke with pts daughter in law, Yaa Christopher at [phone] who was asking for help at home. Pt lives at home with her and she would like social work/case management help for potential medicaid and home health aid information. Gracie PGY-4: s[poke to pt's daughter in law, will be by to  patient. Pt feeling better, ready for dc. Went over discharge instructions with pt and .

## 2018-12-04 NOTE — ED PROVIDER NOTE - PHYSICAL EXAMINATION
back- no midline tenderness, + R paraspinal tenderness in SI region, 5/5 muscle strength in b/l LE, distal sensation intact.

## 2018-12-04 NOTE — ED PROVIDER NOTE - NSFOLLOWUPINSTRUCTIONS_ED_ALL_ED_FT
- Follow up with your doctor within 2-3 days.   - Consider following up with our spine center, see attached paper.   - Take Tylenol (Acetaminophen) 650mg or Motrin (Ibuprofen/Advil) 600mg every 6 hours as needed for pain. For worsened pain take Robaxin 1g every 6 hours as needed. You can apply a Lidocaine patch for 12 hours at a time (12 hours on, 12 hours off). If your insurance does not cover the lidocaine patch there are comparable lidocaine patches available over the counter.   - Your prescription for Furosemide was sent to the pharmacy.  - Return to the ED for any new or worsening symptoms.

## 2018-12-04 NOTE — ED PROVIDER NOTE - MEDICAL DECISION MAKING DETAILS
85 y/o male w/ acute on chronic R lower back pain w/ sciatica- pain control, heat, xray, ortho/PT f/u.

## 2018-12-04 NOTE — ED PROVIDER NOTE - ATTENDING CONTRIBUTION TO CARE
I was physically present for the E/M service provided. I agree with above history, physical, and plan which I have reviewed and edited where appropriate. I was physically present for the key portions of the service provided.    [place]: pt presents to ed c/o right lower back pain x 5 months not improving.  no incontinence, no falls.    right paraspinal ttp not step off or midline tenderness.  moving extremity no saddle anesthesia    a/p: back pain/msk/sciatica.  pain meds, xr and re-assess

## 2019-08-27 NOTE — PROGRESS NOTE ADULT - PROBLEM/PLAN-5
Problem: Adult Inpatient Plan of Care  Goal: Plan of Care Review  Outcome: Ongoing (interventions implemented as appropriate)  Patient stable. VSS. Pain managed with PRN dilaudid and morphine. Nausea treated with PRN zofran & phenergan. Drowsy. Family at bedside. Denies needs. Call bell in reach. Side rails up x2. Bed locked, lowered. Safety maintained.        DISPLAY PLAN FREE TEXT

## 2019-09-19 ENCOUNTER — APPOINTMENT (OUTPATIENT)
Dept: OPHTHALMOLOGY | Facility: CLINIC | Age: 84
End: 2019-09-19
Payer: MEDICARE

## 2019-09-19 ENCOUNTER — NON-APPOINTMENT (OUTPATIENT)
Age: 84
End: 2019-09-19

## 2019-09-19 PROCEDURE — 92004 COMPRE OPH EXAM NEW PT 1/>: CPT

## 2019-09-20 ENCOUNTER — APPOINTMENT (OUTPATIENT)
Dept: OPHTHALMOLOGY | Facility: CLINIC | Age: 84
End: 2019-09-20
Payer: MEDICARE

## 2019-09-20 ENCOUNTER — NON-APPOINTMENT (OUTPATIENT)
Age: 84
End: 2019-09-20

## 2019-09-20 PROCEDURE — 92012 INTRM OPH EXAM EST PATIENT: CPT

## 2019-09-20 PROCEDURE — 92136 OPHTHALMIC BIOMETRY: CPT

## 2019-10-04 ENCOUNTER — APPOINTMENT (OUTPATIENT)
Dept: OPHTHALMOLOGY | Facility: CLINIC | Age: 84
End: 2019-10-04

## 2019-12-19 ENCOUNTER — APPOINTMENT (OUTPATIENT)
Dept: OPHTHALMOLOGY | Facility: CLINIC | Age: 84
End: 2019-12-19

## 2020-02-17 NOTE — PROGRESS NOTE ADULT - PROBLEM SELECTOR PLAN 3
- Patient with severe AS on prior TTE from 2016, suspect clinical presentation at least partially 2/2 AS  - Will repeat TTE  - Consider Cardiology consult if indicated but patient appears to have asymptomatic AS at this time  - Gentle diuresis - Patient with complaints of dizziness x 1 week with gait instability which has since improved  - Incidentally found to have a L pleural effusion on CXR  - Will check a CTH, B12, TSH  - Will Check CTH for structural/ central causes of dizziness  - PT/OT consult  -If patient continues to have sx's would call neurology c/s and check MRI - Patient with complaints of dizziness x 1 week with gait instability which has since improved  - Incidentally found to have a L pleural effusion on CXR  - Will check a CTH: no ICH, acute infarcts  - f/u B12 (584), TSH (5.37), f/u T4  - f/u PT/OT consult Quality 226: Preventive Care And Screening: Tobacco Use: Screening And Cessation Intervention: Patient screened for tobacco use and is an ex/non-smoker Detail Level: Detailed Quality 431: Preventive Care And Screening: Unhealthy Alcohol Use - Screening: Patient screened for unhealthy alcohol use using a single question and scores less than 2 times per year

## 2020-05-28 NOTE — ED PROVIDER NOTE - NSCAREINITIATED _GEN_ER
Department of Obstetrics and Gynecology  Spontaneous Vaginal Delivery Note  Nicole Chapin    Pre-operative Diagnosis: 38 weeks 3 days IUP with a spontaneous labor, augmented with Pitocin,  3 para 2    Post-operative Diagnosis:  Living  infant(s) and Female    Information for the patient's :  Kalpana Brown [[id number]]                    Labor & Delivery Summary  Dilation Complete Date: 20  Dilation Complete Time: 3164    Infant Wt:   Information for the patient's :  Kalpana Brown [[id number]]        7 pound 7 ounce    APGARS:     Information for the patient's :  Kalpana Brown [[id number]]        Apgar 9 at 1 minute 9 at 5 minutes     Anesthesia:  epidural anesthesia    Application and Delivery:  Absent    Delivery Summary:  Labor & Delivery Summary  Dilation Complete Date: 20  Dilation Complete Time: 4354     Patient is admitted to LDR and placed on external monitors. IV fluids started labs are drawn contractions are regular irregular and cervix dilated to 3 cm, artificial rupture of membranes done clear fluid started leaking Intran  is placed and monitoring is continued patient is augmented with Pitocin to adequate labor pattern,FHT reactive with moderate variability without decels. Pt received analgesics IV and or epidural anesthesia. Progress of labor as anticipated and now fully dilated cervix. With subsequent contractions she started pushing and delivered vaginally spontaneously without any complications,Placenta and cord and membranes delivered spontaneously. Pt is given pitocin in IV flluids. Vaginal walls,cervix,perineum,rectum intact. Uterus is well contracted. Pt is watched for next 1 hour. mother and baby both are  stable and doing well. Routine postpartum care    Specimen:  Placenta sent to pathology No    Estimated blood loss: 300 cc             Condition:  infant stable to general nursery and mother stable Infant Feeding:    bottle - Similac with low iron    Leonidas Romero M.D. 5/28/2020 10:40 AM    Chary Jimenez Perez Ring(Resident)

## 2020-11-13 ENCOUNTER — NON-APPOINTMENT (OUTPATIENT)
Age: 85
End: 2020-11-13

## 2020-11-13 ENCOUNTER — APPOINTMENT (OUTPATIENT)
Dept: OPHTHALMOLOGY | Facility: CLINIC | Age: 85
End: 2020-11-13
Payer: MEDICARE

## 2020-11-13 PROCEDURE — 92136 OPHTHALMIC BIOMETRY: CPT

## 2020-11-13 PROCEDURE — 92014 COMPRE OPH EXAM EST PT 1/>: CPT

## 2020-11-13 PROCEDURE — 99072 ADDL SUPL MATRL&STAF TM PHE: CPT

## 2021-02-09 ENCOUNTER — NON-APPOINTMENT (OUTPATIENT)
Age: 86
End: 2021-02-09

## 2021-02-09 ENCOUNTER — APPOINTMENT (OUTPATIENT)
Dept: OPHTHALMOLOGY | Facility: AMBULATORY SURGERY CENTER | Age: 86
End: 2021-02-09
Payer: MEDICARE

## 2021-02-09 PROCEDURE — 66984 XCAPSL CTRC RMVL W/O ECP: CPT | Mod: LT

## 2021-02-10 ENCOUNTER — APPOINTMENT (OUTPATIENT)
Dept: OPHTHALMOLOGY | Facility: CLINIC | Age: 86
End: 2021-02-10
Payer: MEDICARE

## 2021-02-10 ENCOUNTER — NON-APPOINTMENT (OUTPATIENT)
Age: 86
End: 2021-02-10

## 2021-02-10 PROCEDURE — 99024 POSTOP FOLLOW-UP VISIT: CPT

## 2021-02-17 ENCOUNTER — NON-APPOINTMENT (OUTPATIENT)
Age: 86
End: 2021-02-17

## 2021-02-17 ENCOUNTER — APPOINTMENT (OUTPATIENT)
Dept: OPHTHALMOLOGY | Facility: CLINIC | Age: 86
End: 2021-02-17
Payer: MEDICARE

## 2021-02-17 PROCEDURE — 99024 POSTOP FOLLOW-UP VISIT: CPT

## 2021-03-15 ENCOUNTER — APPOINTMENT (OUTPATIENT)
Dept: OPHTHALMOLOGY | Facility: CLINIC | Age: 86
End: 2021-03-15

## 2023-01-31 NOTE — DISCHARGE NOTE ADULT - WEIGHT IN LBS
Subjective:       Patient ID: Laurie Ch is a 24 y.o. female.    Chief Complaint: Glaucoma     HPI    DLS: 10/17/2022 With Dr. Ware    Pt here for Glaucoma Eval HVF/OCT referred by Dr Ware; (Unable to get   OCT OU)  Pt states no eye pain but eyes feel dry and itchy. Pt states no history of   glaucoma that runs in the family.     Meds;  No GTTS    POHx:   1. Chorir oetinal Scar OD   2. ROP OD (status post cryotherapy)   3. Nystagmus   4. Posterior Capsular Opacification OS   5. NAG ( Suspect OD)    Last edited by Cindy Watson on 2/1/2023 10:18 AM.            Assessment & Plan   Narrow angle glaucoma suspect, right    Retinopathy of prematurity of right eye, status post cryotherapy    Chorioretinal scar, right    Posterior capsular opacification, left    Nystagmus    Dr. Ware    PACS OD  Narrow angle  IOP OK  Discussed risk of PACG, high IOP, blindness  Recommend prophylactic LPI     Angle closure precautions discussed: instructed if severe eye pain, unrelenting headache, haloes, photophobia, contact office immediately to be seen      Chorioretinal scar, right  Dr. Ware - appears c/w prior cryo    Retinopathy of prematurity of right eye, status post cryotherapy  Dr. Ware     Nystagmus  Longstanding  Observe    Posterior capsular opacification, left  Per Dr. Ware - retina attached by Bscan last visit  LP vision and poor vision since childhood.  Unclear benefit  Retina service OK for YAG if needed      Full time glasses (polycarbs)      RTC LASER day LPI OD    Lakeshia Bustillos M.D., M.S.  Department of Ophthalmology   Division of Glaucoma Surgery  [facility]     207.4 Alert & oriented; no sensory, motor or coordination deficits, normal reflexes

## 2023-05-11 NOTE — ED PROVIDER NOTE - CALF TENDERNESS
none 54-year-old female with a past medical history of hypertension, diabetes, hyperlipidemia presents with bilateral lower extremity discomfort that she describes as burning for 2 to 3 weeks, chronic constipation without severe abdominal pain as well as pleuritic chest discomfort with occasional cough.  No fever.  No shortness of breath.  Recent cardiac cath that did not show any obstructive disease.  On exam nontoxic, vital signs noted, no murmurs, lungs clear bilaterally, abdomen soft, nondistended, nontender throughout, no CVA tenderness, ED work-up reassuring including negative troponin and EKG.  Clinically doubt ACS.  No significant risk factors for DVT or PE so doubt this.  No signs of pneumonia.

## 2023-11-06 NOTE — PRE-OP CHECKLIST - HAND OFF
This patient is being seen in consultation from Franky Bruno MD     CHIEF COMPLAINT(S):   Chief Complaint   Patient presents with   • Right Shoulder - Pain       HISTORY OF PRESENT ILLNESS:  Elsie Reza is a 73 year old right handed female presenting to the clinic with a complaint of right shoulder pain.  She states that this pain started insidiously in July 2023.  When her pain did not resolve with rest, she saw Franky Bruno MD on 9/28/2023 to have the area evaluated.  He placed an order for her to consult with sports medicine.  At present, she rates her pain at a 4-8/10 on the pain scale, describes the pain as aching, and localizes the pain to the posterior upper arm (superior third) with progression to the posterior shoulder.  She does report intermittent numbness and tingling in the wrist and entire hand.  These neurological symptoms seem to be the worst at night.  She states that sleeping on the shoulder, shoulder extension, having the arm dangle (as with standing), and overhead activity will increase her pain.  She states that her shoulder pain can wake her at night.  She is not icing or heating the area, and she is currently taking 3 baby aspirins as needed for pain.  She denies any previous injuries to this shoulder.    Accompanied by no one  Location: right shoulder  Date of Onset: July 2023  Work related: No  Context: see above  Severity:4-8/10  Timing: constant but tends to worsen over the course of the day  Quality: aching  Associated signs and symptoms: numbness and tingling in the right wrist and hand  Aggravating factors: laying on the shoulder, movement, lifting, and standing  Alleviating factors: nothing  Patient feels at 40/100  Patient occupation/activity level: Retired with generally low daily activity    COVID-19 History  Covid Vaccine Status: unvaccinated  Any previous instances of testing positive for COVID-19: Yes  Date(s): 11/2021  Any previous instances of being presumed  positive for COVID-19: No  Symptoms experienced: fatigue, sore throat  Are these symptoms resolved: No  If No, which symptoms are persisting: fatigue     REVIEW OF SYSTEMS:  Skin: RASH: Location: face and chest.  Description:  itchy, raised and red  HEENT: Eye Problems: dry eyes  Respiratory: No coughing, wheezing, changes in voice, nor shortness of breath  Cardiovascular: No chest pain, palpitations or other cardiac complaints noted  Gastrointestinal: Poor appetite  Genitourinary: Pt denies urinary pain, bleeding and voiding problems  Musculoskeletal: right shoulder and upper arm pain as noted in HPI  Neurologic: intermittent numbness and tingling in the right hand in HPI  Psychiatric: Anxiety  Hematologic/Lymphatic/Immunologic: Weight loss and Allergies  Endocrine: Pt denies thyroid and diabetic problems    Is the patient on an anti-coagulant: No  Date of last GFR test: 12/31/2022  Last GFR value: 90      Past Medical History Updated: Yes  PAST MEDICAL HISTORY:  Past Medical History:   Diagnosis Date   • Acute blood loss anemia 10/13/2023   • ADD (attention deficit disorder)    • COVID-19 virus infection 10/13/2023   • Depression with anxiety    • Diverticulosis 10/16/2023    Last Assessment & Plan:  Formatting of this note might be different from the original. Noted on ct 8/8/23   • Dry eyes, bilateral 12/05/2019   • Functional diarrhea 10/16/2023    Last Assessment & Plan:  Formatting of this note might be different from the original. Has intermittent diarrhea and solid stools  No profuse watery stools Pt reassured this is not c diff  More likely dietary.  If not improving with dietary modification follow up with her GI.   • Generalized anxiety disorder 05/07/2019   • HLD (hyperlipidemia)    • HTN (hypertension)     Had intolerance to multiple antihypertensive medications.  Had cough with lisinopril, dizziness with losartan, shortness of breath with metoprolol, did not tolerate amlodipine   • Macular drusen,  left 12/05/2019   • Mass of breast 10/16/2023    Last Assessment & Plan:  Formatting of this note might be different from the original. Noted on u/s  Due for repeat u/s now  I will put the order in if she doesn't have it already  Pt agreeable  Discussed benefits of continuing with regular mammograms. She was looking into \"alternate\" screening like thermography. We had an extensive conversation about the sensitivity/specificty of thermography and   • Metabolic encephalopathy    • Obstructive sleep apnea (adult) (pediatric) 05/31/2022   • Osteoarthritis of both hips 11/12/2020   • PCO (posterior capsular opacification), bilateral 07/22/2022   • Pelvic congestion 10/16/2023    Last Assessment & Plan:  Formatting of this note might be different from the original. Noted on CT scan 8/8/23 No   symptoms at this time   • Perforation of colon (CMD) 03/11/2015    Last Assessment & Plan:  Formatting of this note might be different from the original. Had prior perforation s/p colonoscopy Per pt was told never to do a colonoscopy again She did have a recent negative cologuard.  She does complain of bloating. She may benefit from still following up with her GI but we can wait for lab results.   • Persistent depressive disorder 05/07/2019   • Positive TWIN (antinuclear antibody) 10/16/2023    Last Assessment & Plan:  Formatting of this note might be different from the original. Had + TWIN  Discussed the limitations of this test She would like to repeat to assure it is not changing   • Unintentional weight loss 10/16/2023    Last Assessment & Plan:  Formatting of this note might be different from the original. She is concerned she has been losing weight Has had extensive workup with labs and imaging with prior pcp  Some of these records are visible for us  Otherwise no B symptoms  Had + lyme and TWIN. She didn't have any further eval for these. She would like to repeat these tests.  We can rerun some labs for her and f       Past  Surgical History Updated: Yes   PAST SURGICAL HISTORY:  Past Surgical History:   Procedure Laterality Date   • Appendectomy     • Hemicolectomy  2015    for perforated viscus after colonoscopy.hospitalized with abd pain    • Total hip replacement Left    • Tubal ligation         Past Family History Updated: Yes   FAMILY HISTORY:  Family History   Problem Relation Age of Onset   • Cancer Mother         ?colon cancer,htn,hypohtyroid,heart disease   • Asthma Father         [email or web address].   • Cancer Brother         bladder cancer,AF   • Glaucoma Paternal Aunt    • Macular degeneration Paternal Aunt    • Glaucoma Paternal Uncle    • Macular degeneration Paternal Uncle        Past Social History Updated: Yes  SOCIAL HISTORY:  Social History     Socioeconomic History   • Marital status:      Spouse name: Not on file   • Number of children: Not on file   • Years of education: Not on file   • Highest education level: Not on file   Occupational History   • Not on file   Tobacco Use   • Smoking status: Never   • Smokeless tobacco: Never   Vaping Use   • Vaping Use: never used   Substance and Sexual Activity   • Alcohol use: Yes     Comment: socially    • Drug use: Never   • Sexual activity: Not Currently   Other Topics Concern   • Not on file   Social History Narrative    [facility] 11/15/21- with c/o:  upper respiratory symptoms.  x 10 day(s), she has primarily had cough. + sore throat and fatigue.   Not vaccinated    Tmax 100.6.  COVID + Chest x ray neg.  She was given Doxy and tessalon perles        She called 11/17- requesting monoclonal antibody treatment.  She was advised ER eval    [facility]- ER- she indicated she was having mild cough, runny nose and fatigue.    VSS_ afebrile.  After discussion about the infusion- she declined treatment.       Social Determinants of Health     Financial Resource Strain: Not on file   Food Insecurity: Not on file   Transportation Needs: Not on file   Physical Activity: Not on file   Stress: Not on  file   Social Connections: Not on file   Intimate Partner Violence: Not on file       MEDICATIONS:  Current Outpatient Medications   Medication Sig Dispense Refill   • methylpheniDATE (RITALIN) 5 MG tablet Take 1 tablet by mouth as needed.     • meloxicam (MOBIC) 7.5 MG tablet Take 1 tablet by mouth daily (with breakfast) for 10 days. 10 tablet 0   • gabapentin (NEURONTIN) 100 MG capsule Take 1 capsule by mouth nightly. As needed 30 capsule 0   • busPIRone (BUSPAR) 5 MG tablet Take 1 tablet by mouth in the morning and 1 tablet in the evening. 60 tablet 1   • sertraline (ZOLOFT) 100 MG tablet Take 1 tablet by mouth daily. 30 tablet 1   • ALPRAZolam (XANAX) 0.25 MG tablet Take 1 tablet by mouth daily as needed for Anxiety. 15 tablet 0   • ketoconazole (NIZORAL) 2 % cream Apply topically daily. 15 g 0   • cholecalciferol (VITAMIN D) 25 mcg (1,000 units) tablet Take 1 tablet by mouth daily.       No current facility-administered medications for this visit.       ALLERGIES:   ALLERGIES:  Latex, Adhesive   (environmental), Codeine, and Lisinopril    Roomed By: Paul RYAN, SILKE, LAT, ATC    VITAL SIGNS:  Visit Vitals  /84   Pulse 72   Ht 5' 6.5\" (1.689 m)   Wt 57.2 kg (126 lb)   BMI 20.03 kg/m²       EXAM:  This is a 73 year old female, awake, alert, and cooperative. She is well nourished, well developed, and in no apparent distress.  Respirations are regular and unlabored.     General Neurologic:  Oriented x 3 with normal mood and affect.     Lymphatics:  No evidence of adenopathy in effected extremity.    Skin:  Head, neck, and extremity skin is intact without rashes or lesions.    Shoulder Exam:  Comprehensive Orthopaedic Shoulder Exam    Right Strength Exam  Deltoid strength is 4/5.  RC - SS strength is 3/5.  RC - ER strength is 3/5.  RC - IR strength is 4/5.    Left Strength Exam  Deltoid strength is 4+/5.  RC - SS strength is 4+/5.  RC - ER strength is 4+/5.  RC - IR strength is 4+/5.    Right shoulder:  Mild SS/GT  tenderness noted.  Neer's sign is positive for pain  Hawkin's sign is positive for pain  Moderately painful arc / range appreciated (worst with shoulder abduction).  Drop-arm is positive for pain  Superior escape is negative.  Lift-off sign / belly press is positive for pain and reduced range of motion  No bicipital tenderness noted.  Roosevelt's sign is negative.  Yergason's test is negative.  Speed's test is negative.  Negative for muscle deformity (Shalom).  No AC tenderness noted.  Cross-chest adduction is positive for pain.  Negative for AC deformity.    Left shoulder:  No SS/GT tenderness noted.  Neer's sign is negative  Hawkin's sign is negative.  No painful arc / range appreciated.  Drop-arm is negative.  Superior escape is negative.  Lift-off sign / belly press is negative.  No bicipital tenderness noted.  Roosevelt's sign is negative.  Yergason's test is negative.  Speed's test is negative.  Negative for muscle deformity (Shalom).  No AC tenderness noted.  Cross-chest adduction is negative.  Negative for AC deformity.      Right shoulder:  Generalized laxity (thumb, mcp, elbow) is negative.  Anterior apprehension (90/90) is negative.  Relocation test at 90 degrees (pain vs. apprehension) is negative.  Relocation test >90 degrees (pain vs. apprehension) is negative.  Post apprehension is negative.  Anterior drawer is 0.  Posterior drawer is 0.  Sulcus sign is 0.  Sulcus sign in ER is 0.    Left shoulder:  Generalized laxity (thumb, mcp, elbow) is negative.  Anterior apprehension (90/90) is negative.  Relocation test at 90 degrees (pain vs. apprehension) is negative.  Relocation test >90 degrees (pain vs. apprehension) is negative.  Post apprehension is negative.  Anterior drawer is 0.  Posterior drawer is 0.  Sulcus sign is 0.  Sulcus sign in ER is 0.    Right Additional Exam  No atrophy / asymmetry appreciated.  Scapular winging is present  Some subacromial crepitation noted.  No scapulothoracic crepitation  noted.  Surgical scars are absent.  No medial scapular tenderness.  No trapezius pain.    Left Additional Exam  No atrophy / asymmetry appreciated.  Scapular winging is present  No subacromial crepitation noted.  No scapulothoracic crepitation noted.  Surgical scars are absent.  No medial scapular tenderness.  No trapezius pain.    Right Vascular Exam  Radial pulse is Normal.  No edema noted.    Left Vascular Exam  Radial pulse is Normal.  No edema noted.         IMAGING &TEST:  X-rays of the right shoulder in 3 views from 11/6/2023 were personally reviewed and interpreted by me.  These show degenerative changes but no acute bony abnormalities.     ASSESSMENT & PLAN:  Elsie Reza is a 73 year old female with chronic right shoulder pain secondary to rotator cuff tendinopathy and DJD.  She received prescriptions for Mobic and for physical therapy.  She was advised to ice 3 times a day for 20 minutes at a time.  She may take Tylenol as needed for pain.    Restrictions: Use as tolerated    The patient will follow up with Ashley Gonzalez MD or us in 6 week(s)    Chronic right shoulder pain  (primary encounter diagnosis)  Plan: XR SHOULDER 3 VIEWS RIGHT, SERVICE TO PHYSICAL         THERAPY    Tendinopathy of right rotator cuff  Plan: SERVICE TO PHYSICAL THERAPY    Glenohumeral arthritis, right  Plan: SERVICE TO PHYSICAL THERAPY    Arthritis of right acromioclavicular joint  Plan: SERVICE TO PHYSICAL THERAPY    Hyperlipidemia, unspecified hyperlipidemia type    Primary hypertension    Generalized anxiety disorder    Positive TWIN (antinuclear antibody)      Clinical staff documentation is noted and accepted.  Electronically signed by: Jimmy Guy MD 11/6/2023       This note was shared with Ashley Gonzalez MD and Franky Bruno MD    Note to patient: The 21st Century Cures Act makes medical notes like these available to patients in the interest of transparency. However, be advised this is a medical  document. It is intended as peer to peer communication. It is written in medical language and may contain abbreviations or verbiage that are unfamiliar. It may appear blunt or direct. Medical documents are intended to carry relevant information, facts as evident, and the clinical opinion of the practitioner.             yes

## 2023-11-30 NOTE — BRIEF OPERATIVE NOTE - ASSISTANT(S)
----- Message from Taryn Singh NP sent at 11/30/2023  1:32 PM CST -----  Please call and notify parent of positive strep  I sent in an antibiotic to their preferred pharmacy already.  Change out toothbrush in 48 hours after treatment started and wash water bottles/straws/etc very well.  May give Tylenol and ibuprofen for pain/fevers.  Follow up with PCP as needed.     CONOR PIERCE
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
## 2024-06-18 NOTE — ED PROVIDER NOTE - TOBACCO USE
Unknown if ever smoked FAMILY HISTORY:  No pertinent family history in first degree relatives Never smoker

## 2025-01-05 NOTE — PRE-OP CHECKLIST - SITE MARKED BY ANESTHESIOLOGIST
"  Problem: Adult Inpatient Plan of Care  Goal: Plan of Care Review  Description: The Plan of Care Review/Shift note should be completed every shift.  The Outcome Evaluation is a brief statement about your assessment that the patient is improving, declining, or no change.  This information will be displayed automatically on your shift  note.  Outcome: Progressing  Goal: Patient-Specific Goal (Individualized)  Description: You can add care plan individualizations to a care plan. Examples of Individualization might be:  \"Parent requests to be called daily at 9am for status\", \"I have a hard time hearing out of my right ear\", or \"Do not touch me to wake me up as it startles  me\".  Outcome: Progressing  Goal: Absence of Hospital-Acquired Illness or Injury  Outcome: Progressing  Goal: Optimal Comfort and Wellbeing  Outcome: Progressing  Goal: Readiness for Transition of Care  Outcome: Progressing   Goal Outcome Evaluation:         Pt alert and oriented x4, chest pain, Nitroglycerin ointment given x1, removed after 15 minutes, reports no relieve, PRN dilaudid given x1, NS infusing at 50/hr, slept between cares                " n/a